# Patient Record
Sex: FEMALE | NOT HISPANIC OR LATINO
[De-identification: names, ages, dates, MRNs, and addresses within clinical notes are randomized per-mention and may not be internally consistent; named-entity substitution may affect disease eponyms.]

---

## 2019-06-12 ENCOUNTER — RX ONLY (RX ONLY)
Age: 49
End: 2019-06-12

## 2019-06-12 RX ORDER — TRETINOIN 0.5 MG/G
CREAM TOPICAL
Qty: 1 | Refills: 0 | Status: ERX | COMMUNITY
Start: 2019-06-12

## 2019-09-10 ENCOUNTER — RX ONLY (RX ONLY)
Age: 49
End: 2019-09-10

## 2019-09-10 RX ORDER — AZELAIC ACID 0.15 G/G
15% GEL TOPICAL TWICE A DAY
Qty: 1 | Refills: 2 | Status: ERX | COMMUNITY
Start: 2019-09-10

## 2021-02-04 ENCOUNTER — IMMUNIZATION (OUTPATIENT)
Dept: FAMILY MEDICINE | Facility: CLINIC | Age: 51
End: 2021-02-04
Payer: COMMERCIAL

## 2021-02-04 PROCEDURE — 0001A PR COVID VAC PFIZER DIL RECON 30 MCG/0.3 ML IM: CPT

## 2021-02-04 PROCEDURE — 91300 PR COVID VAC PFIZER DIL RECON 30 MCG/0.3 ML IM: CPT

## 2021-02-20 ENCOUNTER — HEALTH MAINTENANCE LETTER (OUTPATIENT)
Age: 51
End: 2021-02-20

## 2021-02-25 ENCOUNTER — IMMUNIZATION (OUTPATIENT)
Dept: FAMILY MEDICINE | Facility: CLINIC | Age: 51
End: 2021-02-25
Attending: FAMILY MEDICINE
Payer: COMMERCIAL

## 2021-02-25 PROCEDURE — 0002A PR COVID VAC PFIZER DIL RECON 30 MCG/0.3 ML IM: CPT

## 2021-02-25 PROCEDURE — 91300 PR COVID VAC PFIZER DIL RECON 30 MCG/0.3 ML IM: CPT

## 2021-09-26 ENCOUNTER — HEALTH MAINTENANCE LETTER (OUTPATIENT)
Age: 51
End: 2021-09-26

## 2022-03-12 ENCOUNTER — HEALTH MAINTENANCE LETTER (OUTPATIENT)
Age: 52
End: 2022-03-12

## 2022-12-15 ENCOUNTER — TRANSFERRED RECORDS (OUTPATIENT)
Dept: HEALTH INFORMATION MANAGEMENT | Facility: CLINIC | Age: 52
End: 2022-12-15

## 2022-12-28 ENCOUNTER — TELEPHONE (OUTPATIENT)
Dept: OTOLARYNGOLOGY | Facility: CLINIC | Age: 52
End: 2022-12-28

## 2022-12-28 DIAGNOSIS — C80.1 ADENOCARCINOMA (H): Primary | ICD-10-CM

## 2022-12-28 NOTE — TELEPHONE ENCOUNTER
Writer called to schedule patient with Dr. Vyas.     Patient confirmed appointment for 1/4/23 at 1230.    PET/CT and MRI ordered.     Patient is going to call to get herself scheduled for MRI and PET/CT. She will let me know if she is having difficulty getting this done.     Syeda Jenkins RN on 12/28/2022 at 9:58 AM

## 2022-12-28 NOTE — TELEPHONE ENCOUNTER
FUTURE VISIT INFORMATION      FUTURE VISIT INFORMATION:    Date: 1/4/23    Time: 12:30pm    [place] [facility] [place]  REFERRAL INFORMATION:    Referring provider:      Referring providers clinic:      Reason for visit/diagnosis  Adenocarcinoma    RECORDS REQUESTED FROM:       Clinic name Comments Records Status Imaging Status   imaging 1/3/23- pet, mr, ct - pending  Epic     [facility]  12/15/22- CT Sinus     12/23/22, 12/1/22- note with Brenda Crawley MD  3/27/19- note with Karel Crowley MBBS [facility]  12/28/22- Pending Req  -PACS                             December 28, 2022 10:48 AM - Faxed a request to [facility] to push Image to [facility] PACS- Ciara   December 28, 2022 11:18 AM - Received image in pacs and attached it to patient and sent report to scan- Ciara

## 2023-01-02 ENCOUNTER — TELEPHONE (OUTPATIENT)
Dept: OTOLARYNGOLOGY | Facility: CLINIC | Age: 53
End: 2023-01-02

## 2023-01-02 NOTE — TELEPHONE ENCOUNTER
Writer spoke with patient and confirmed PET CT and MRI are scheduled for tomorrow.     Patient verbalized understanding and has no further questions at this time.    Syeda Jenkins RN on 1/2/2023 at 1:06 PM

## 2023-01-03 ENCOUNTER — HOSPITAL ENCOUNTER (OUTPATIENT)
Dept: MRI IMAGING | Facility: CLINIC | Age: 53
Discharge: HOME | End: 2023-01-03
Attending: OTOLARYNGOLOGY
Payer: COMMERCIAL

## 2023-01-03 ENCOUNTER — ANCILLARY ORDERS (OUTPATIENT)
Dept: OTOLARYNGOLOGY | Facility: CLINIC | Age: 53
End: 2023-01-03

## 2023-01-03 ENCOUNTER — HOSPITAL ENCOUNTER (OUTPATIENT)
Dept: PET IMAGING | Facility: CLINIC | Age: 53
Discharge: HOME | End: 2023-01-03
Attending: OTOLARYNGOLOGY
Payer: COMMERCIAL

## 2023-01-03 DIAGNOSIS — C80.1 ADENOCARCINOMA (H): ICD-10-CM

## 2023-01-03 LAB
CREAT BLD-MCNC: 0.8 MG/DL (ref 0.5–1)
GFR SERPL CREATININE-BSD FRML MDRD: >60 ML/MIN/1.73M2

## 2023-01-03 PROCEDURE — 74177 CT ABD & PELVIS W/CONTRAST: CPT | Mod: 26 | Performed by: RADIOLOGY

## 2023-01-03 PROCEDURE — 70491 CT SOFT TISSUE NECK W/DYE: CPT

## 2023-01-03 PROCEDURE — A9585 GADOBUTROL INJECTION: HCPCS | Performed by: OTOLARYNGOLOGY

## 2023-01-03 PROCEDURE — 70491 CT SOFT TISSUE NECK W/DYE: CPT | Mod: 26 | Performed by: RADIOLOGY

## 2023-01-03 PROCEDURE — 78815 PET IMAGE W/CT SKULL-THIGH: CPT | Mod: PI

## 2023-01-03 PROCEDURE — 250N000011 HC RX IP 250 OP 636: Performed by: OTOLARYNGOLOGY

## 2023-01-03 PROCEDURE — 70553 MRI BRAIN STEM W/O & W/DYE: CPT | Mod: 26 | Performed by: RADIOLOGY

## 2023-01-03 PROCEDURE — 82565 ASSAY OF CREATININE: CPT

## 2023-01-03 PROCEDURE — 343N000001 HC RX 343: Performed by: OTOLARYNGOLOGY

## 2023-01-03 PROCEDURE — 70553 MRI BRAIN STEM W/O & W/DYE: CPT

## 2023-01-03 PROCEDURE — A9552 F18 FDG: HCPCS | Performed by: OTOLARYNGOLOGY

## 2023-01-03 PROCEDURE — 78815 PET IMAGE W/CT SKULL-THIGH: CPT | Mod: 26 | Performed by: RADIOLOGY

## 2023-01-03 PROCEDURE — 71260 CT THORAX DX C+: CPT | Mod: 26 | Performed by: RADIOLOGY

## 2023-01-03 PROCEDURE — 255N000002 HC RX 255 OP 636: Performed by: OTOLARYNGOLOGY

## 2023-01-03 RX ORDER — IOPAMIDOL 755 MG/ML
10-135 INJECTION, SOLUTION INTRAVASCULAR ONCE
Status: COMPLETED | OUTPATIENT
Start: 2023-01-03 | End: 2023-01-03

## 2023-01-03 RX ORDER — GADOBUTROL 604.72 MG/ML
10 INJECTION INTRAVENOUS ONCE
Status: COMPLETED | OUTPATIENT
Start: 2023-01-03 | End: 2023-01-03

## 2023-01-03 RX ADMIN — IOPAMIDOL 100 ML: 755 INJECTION, SOLUTION INTRAVENOUS at 07:40

## 2023-01-03 RX ADMIN — GADOBUTROL 8.5 ML: 604.72 INJECTION INTRAVENOUS at 09:13

## 2023-01-03 RX ADMIN — FLUDEOXYGLUCOSE F-18 10.9 MCI.: 500 INJECTION, SOLUTION INTRAVENOUS at 06:43

## 2023-01-04 ENCOUNTER — PRE VISIT (OUTPATIENT)
Dept: OTOLARYNGOLOGY | Facility: CLINIC | Age: 53
End: 2023-01-04

## 2023-01-04 ENCOUNTER — OFFICE VISIT (OUTPATIENT)
Dept: OTOLARYNGOLOGY | Facility: CLINIC | Age: 53
End: 2023-01-04
Payer: COMMERCIAL

## 2023-01-04 VITALS
RESPIRATION RATE: 16 BRPM | HEART RATE: 71 BPM | HEIGHT: 68 IN | DIASTOLIC BLOOD PRESSURE: 80 MMHG | TEMPERATURE: 96.2 F | WEIGHT: 184 LBS | SYSTOLIC BLOOD PRESSURE: 117 MMHG | BODY MASS INDEX: 27.89 KG/M2

## 2023-01-04 DIAGNOSIS — J34.89 NASAL OBSTRUCTION: ICD-10-CM

## 2023-01-04 DIAGNOSIS — G50.1 ATYPICAL FACIAL PAIN: ICD-10-CM

## 2023-01-04 DIAGNOSIS — C80.1 ADENOCARCINOMA (H): Primary | ICD-10-CM

## 2023-01-04 PROCEDURE — 31231 NASAL ENDOSCOPY DX: CPT | Performed by: OTOLARYNGOLOGY

## 2023-01-04 PROCEDURE — 99204 OFFICE O/P NEW MOD 45 MIN: CPT | Mod: 25 | Performed by: OTOLARYNGOLOGY

## 2023-01-04 RX ORDER — PROPRANOLOL HYDROCHLORIDE 20 MG/1
20 TABLET ORAL 2 TIMES DAILY
COMMUNITY
Start: 2022-01-31

## 2023-01-04 RX ORDER — LEVOTHYROXINE SODIUM 88 UG/1
88 TABLET ORAL
Status: ON HOLD | COMMUNITY
Start: 2022-01-31 | End: 2023-02-03

## 2023-01-04 RX ORDER — RIZATRIPTAN BENZOATE 10 MG/1
TABLET ORAL
COMMUNITY
Start: 2022-12-16

## 2023-01-04 RX ORDER — ONDANSETRON 4 MG/1
4 TABLET, ORALLY DISINTEGRATING ORAL
Status: ON HOLD | COMMUNITY
Start: 2022-12-01 | End: 2023-01-17

## 2023-01-04 RX ORDER — MULTIVIT-MIN/IRON/FOLIC ACID/K 18-600-40
1 CAPSULE ORAL DAILY
COMMUNITY
End: 2024-08-14

## 2023-01-04 RX ORDER — TRETINOIN 0.5 MG/G
1 CREAM TOPICAL AT BEDTIME
COMMUNITY
End: 2023-08-18

## 2023-01-04 RX ORDER — AZELAIC ACID 0.15 G/G
1 GEL TOPICAL DAILY
COMMUNITY
End: 2023-08-18

## 2023-01-04 RX ORDER — LEVOCETIRIZINE DIHYDROCHLORIDE 5 MG/1
TABLET, FILM COATED ORAL EVERY EVENING
COMMUNITY
End: 2024-08-14

## 2023-01-04 ASSESSMENT — PAIN SCALES - GENERAL: PAINLEVEL: NO PAIN (0)

## 2023-01-04 NOTE — PATIENT INSTRUCTIONS
1. Please follow-up in clinic after surgery   2. Please call the ENT clinic with any questions,concerns, new or worsening symptoms.    -Clinic number is [phone]   - Syeda's direct line (Dr. Vyas's nurse) [phone]     Surgery Teaching    1. Someone from our scheduling department will call you within approximately one week to get you scheduled with your provider for surgery. If no one has called you in one week, please notify us.    2. You must have a physical exam (called  history and physical ) within 30 days of surgery. You may complete this with your primary care provider.   A. If your provider is outside of the [facility] network please have them complete the preoperative forms provided to you in the surgery packet you will be mailed and be sure to have your provider fax them to the appropriate location prior to surgery. For surgery at the [facility] the fax number is:[phone]. For surgery at the [facility] the fax number is [phone].  B. In some cases we may have you see our Preoperative Assessment Center. If we have expressed this to you, our  will set up your appointment with them when they call to set up your surgery.    3. For same-day surgery, you must arrange for an adult to take you home from the Center. An adult must stay with you for the first 24 hours after surgery. You cannot drive for 24 hours.     4. Ask your doctor what medicines are safe before surgery. For over the counter medications and supplements it is advised that you do NOT TAKE MOTRIN, IBUPROFEN, ASPIRIN, ALEVE, GARLIC SUPPLEMENTS or FISH OIL x 7 days prior to surgery (to prevent excess bleeding and bruising at time of surgery). If your provider advises you to take any medication the morning of surgery you should take this with a sip of water.    5. A few days prior to surgery a nurse will call you to review your health history and instructions for before and after surgery. They will give you your final arrival time based  upon your scheduled arrival time for surgery.    6. Call the surgical team if there's any change in your health prior to surgery. Things you should call for include but are not limited to signs of a cold or the flu (sore throat, runny nose, cough, rash, fever). Other things to notify them for is for any open wounds (cuts, scrapes, scratches) near to the surgery site.    7. If you drink alcohol, stop drinking alcohol at least 24 hours before surgery.    8. If you smoke, stop or at least cut down on smoking 24 hours before surgery.    9.Take a bath or shower the night before and the morning of surgery (as told by your surgeon). Use an antiseptic soap. If your doctor does not give you special soap, buy Hibiclens or Krupa-Stat at the drug store or ask the pharmacist to suggest a brand. You will wash with this from the neck down, washing your hair and face as you would normally.   A. When you are done with your shower please be sure to use clean towels to dry with, have clean linens on your bed, and put on clean clothes each time.   B. DO NOT put on lotion, powder, perfume, deodorant or make-up after bathing.    10. You can eat a normal meal the night before surgery. Do not eat any solid foods or drink any milk products for 8 hours before surgery.     11. You may drink clear liquids until 2 hours before surgery. Clear liquids include water, Gatorade, apple juice and liquids you can see through.    12. No eating or drinking 2 hours prior to surgery until after surgery. Your post op team will review any diet limitations you might have and when you can start eating and drinking again after surgery.      If you have any questions before or after surgery please call:    FLORECITA Castillo  [facility]  Department of Otolaryngology  [phone]

## 2023-01-04 NOTE — LETTER
1/4/2023       RE: Rosalind Murphy  [address] [place] [facility] [place] [address]     Dear Colleague,    Thank you for referring your patient, Rosalind Murphy, to the [facility] [place] at [facility]. Please see a copy of my visit note below.                       [facility]                   New Patient Visit      Encounter date: January 4, 2023    Referring Provider: Herbert Crawley MD    Chief Complaint: nasal cancery, adenocarcinoma    History of Present Illness: Rosalind Murphy is a pleasant 52 year-old woman who I am seeing at the request of Herbert Crawley for left-sided nasal adenocarcinoma. She had been experiencing ~2 months of worsening left-sided nasal obstruction and facial pressure that would not resolve with antibiotics and OTC measures.  She saw Herbert Crawley who identified a left sided nasal mass. She subsequently underwent CT and then biopsy of this lesion and the path returned moderately differentiated adenocarcinoma (non ITAC). She has no occupational exposures, or any other history of cancers of any other organ system. She denies epistaxis. No nasal pain. Most bothersome symptom is nasal obstruction. She underwent PET/CT and MRI yesterday and we have reviewed these below.         Review of systems: A 14-point review of systems has been conducted and was negative for any notable symptoms, except as dictated in the history of present illness.     PMH:    Allergic rhinitis, cause unspecified     Lumbago     Other specified disorders of thyroid     Rheumatoid arthritis(714.0)     Unspecified cataract       PSH:  No prior sinonasal surgery    FH:   breast cancer - mother      Social History     Socioeconomic History     Marital status:         Physical Exam:  Vital signs: /80 (BP Location: Left arm, Patient Position: Sitting, Cuff Size: Adult Regular)   Pulse 71   Temp (!) 96.2  F (35.7  C) (Temporal)   Resp 16   Ht  "1.727 m (5' 8\")   Wt 83.5 kg (184 lb)   BMI 27.98 kg/m     General Appearance: No acute distress, appropriate demeanor, conversant  Eyes: moist conjunctivae; EOMI; pupils symmetric; visual acuity grossly intact; no proptosis  Head: normocephalic; overall symmetric appearance without deformity  Face: overall symmetric without deformity; HB I/VI  Ears: Normal appearance of external ear; external meatus normal in appearance; TMs intact without perforation bilaterally;   Nose: No external deformity; see nasal endoscopy  Oral Cavity/oropharynx: Normal appearance of mucosa; tongue midline; no mass or lesions; oropharynx without obvious mucosal abnormality  Neck: no palpable lymphadenopathy; thyroid without palpable nodules  Lungs: symmetric chest rise; no wheezing  CV: Good distal perfusion; normal heart rate  Extremities: No deformity  Neurologic Exam: Cranial nerves II-XII are grossly intact; no focal deficit      Procedure Note  Procedure performed: Rigid nasal endoscopy  Indication: To evaluate for sinonasal pathology not visualized on routine anterior rhinoscopy  Anesthesia: 4% topical lidocaine with 0.05% oxymetazoline  Description of procedure: A 30 degree, 3 mm rigid endoscope was inserted into bilateral nasal cavities and the nasal valve, nasal cavity, middle meatus, sphenoethmoid recess, and nasopharynx were thoroughly evaluated for evidence of obstruction, edema, purulence, polyps and/or mass/lesion.     Jia-Imtiaz Endoscopic Scoring System  Endoscopic observation Right Left   Polyps in middle meatus (0 = absent, 1 = restricted to middle meatus, 2 = Beyond middle meatus) 0 0   Discharge (0 = absent, 1 = thin and clear, 2 = thick, purulent) 0 1   Edema (0 = absent, 1 = mild-moderate, 2 = moderate-severe) 1 0   Crusting (0 = absent, 1 = mild-moderate, 2 = moderate-severe) 0 0   Scarring (0= absent, 1 = mild-moderate, 2 = moderate-severe) 0 0   Total 0 1     Findings  RT: MM and SER clear; nasal cavity " clear  LT: MM clear; nasal cavity mass appears to be pedicled posteriorly in the nasal cavity    The patient tolerated the procedure well without complication.     Laboratory Review:  n/a    Imaging Review:  CT sinus 12/15/2022:  1.  The left nasal cavity is opacified along its mid to posterior   aspect with extension of soft tissue into the left posterior   nasopharynx. There is questionable remodeling of the posterolateral   wall of the left nasal cavity. Is an underlying lesion at this   location is possible and correlation with direct visualization is   advised.      MRI skull base w and w/o contrast 1/2/2023  IMPRESSION: 3.2 x 1.4 x 3.1 cm left posterior nasal cavity mass  compatible with pathology proven adenocarcinoma..    PET/CT 1/3/2023:  IMPRESSION: In this patient with biopsy-proven left nasal cavity  adenocarcinoma:  1. No evidence of metastasis in the chest, abdomen or pelvis.  2. Please refer to dedicated report for findings in the head and neck  region.     Impression:   1. 2.8 x 2.7 x 1.4 cm hypermetabolic mass in the left posterior nasal  cavity compatible with pathology proven adenocarcinoma.  2. No cervical lymphadenopathy.   3. Please refer to the whole body PET CT performed as a separate  report, for the findings of the remainder of the body.      *I have personally reviewed these images and agree with the radiologist's interpretation*      Pathology Review:  Nasal mass biopsy - 12/23/2022  Final Diagnosis  NASAL MASS, BIOPSY:   1. Moderately-differentiated adenocarcinoma, favor     sinonasal adenocarcinoma, non-intestinal type        Assessment/Medical Decision Making:  T1N0M0 nasal cavity non-intestinal type adenocarcinoma  Nasal obstruction secondary to above  Atypical facial pain    I reviewed pathology, imaging, and endoscopic findings with Rosalind and her , Brad, today. We discussed treatment options for sinonasal malignancy. We discussed standard of care management, including  endoscopic surgical resection and the possibility of adjuvant radiotherapy for microscopic disease.  Chemotherapy may be indicated for positive margins. We discussed the relevant risks of surgery specific to Rosalind's case, including but not limited to: orbital injury, bleeding secondary to injury to internal maxillary artery or its branches (which might require embolization), dry eye, eustachian tube dysfunction, among other risks. Rosalind and her  were allowed to answer a number of insightful questions. Both were eager to proceed with scheduling surgery.       Plan:  1. [place] [facility] [place]  2. Will schedule surgery - LT endoscopic, endonasal resection of sinonasal adenocarcinoma  3. Radiation oncology referral - Anand Bergeron  4. RTC post-op      Germain Vyas MD    [facility]  Center for Skull Base and Pituitary Surgery  [facility]  Department of Otolaryngology - Head & Neck Surgery          Again, thank you for allowing me to participate in the care of your patient.      Sincerely,    Germain Vyas MD

## 2023-01-04 NOTE — TELEPHONE ENCOUNTER
RECORDS STATUS - ALL OTHER DIAGNOSIS      Action    Action Taken 1/4/23  LVM for pt re: recs call  4:55 PM    Pt returned my call, advised they'd never had RT, Chemo, or met with an oncologist/hematolgist before.  4:59 PM       RECORDS RECEIVED FROM: [facility]   DATE RECEIVED: 1/4   NOTES STATUS DETAILS   OFFICE NOTE from referring provider Germain [place]ifuent[place] [facility]   OPERATIVE REPORT [facility] - [facility] 3/19/15: Exploratory Laparotomy  3/18/15: Hysterectomy  12/5/07: Insertion of Vaginal Tape   MEDICATION LIST [facility]    LABS     PATHOLOGY REPORTS [facility], Report in CE 12/23/22: [id number]   ANYTHING RELATED TO DIAGNOSIS Epic 1/3/22   IMAGING (NEED IMAGES & REPORT)     CT SCANS PACS [facility]  12/15/22    [facility]  1/3/22   MRI PACS [facility]  1/3/22   PET PACS [facility]  1/3/22

## 2023-01-04 NOTE — PROGRESS NOTES
"                   [facility]                   New Patient Visit      Encounter date: January 4, 2023    Referring Provider: Herbert Crawley MD    Chief Complaint: nasal cancery, adenocarcinoma    History of Present Illness: Rosalind Murphy is a pleasant 52 year-old woman who I am seeing at the request of Herbert Crawley for left-sided nasal adenocarcinoma. She had been experiencing ~2 months of worsening left-sided nasal obstruction and facial pressure that would not resolve with antibiotics and OTC measures.  She saw Herbert Crawley who identified a left sided nasal mass. She subsequently underwent CT and then biopsy of this lesion and the path returned moderately differentiated adenocarcinoma (non ITAC). She has no occupational exposures, or any other history of cancers of any other organ system. She denies epistaxis. No nasal pain. Most bothersome symptom is nasal obstruction. She underwent PET/CT and MRI yesterday and we have reviewed these below.         Review of systems: A 14-point review of systems has been conducted and was negative for any notable symptoms, except as dictated in the history of present illness.     PMH:    Allergic rhinitis, cause unspecified     Lumbago     Other specified disorders of thyroid     Rheumatoid arthritis(714.0)     Unspecified cataract       PSH:  No prior sinonasal surgery    FH:   breast cancer - mother      Social History     Socioeconomic History     Marital status:         Physical Exam:  Vital signs: /80 (BP Location: Left arm, Patient Position: Sitting, Cuff Size: Adult Regular)   Pulse 71   Temp (!) 96.2  F (35.7  C) (Temporal)   Resp 16   Ht 1.727 m (5' 8\")   Wt 83.5 kg (184 lb)   BMI 27.98 kg/m     General Appearance: No acute distress, appropriate demeanor, conversant  Eyes: moist conjunctivae; EOMI; pupils symmetric; visual acuity grossly intact; no proptosis  Head: normocephalic; overall symmetric appearance without deformity  Face: overall symmetric " without deformity; HB I/VI  Ears: Normal appearance of external ear; external meatus normal in appearance; TMs intact without perforation bilaterally;   Nose: No external deformity; see nasal endoscopy  Oral Cavity/oropharynx: Normal appearance of mucosa; tongue midline; no mass or lesions; oropharynx without obvious mucosal abnormality  Neck: no palpable lymphadenopathy; thyroid without palpable nodules  Lungs: symmetric chest rise; no wheezing  CV: Good distal perfusion; normal heart rate  Extremities: No deformity  Neurologic Exam: Cranial nerves II-XII are grossly intact; no focal deficit      Procedure Note  Procedure performed: Rigid nasal endoscopy  Indication: To evaluate for sinonasal pathology not visualized on routine anterior rhinoscopy  Anesthesia: 4% topical lidocaine with 0.05% oxymetazoline  Description of procedure: A 30 degree, 3 mm rigid endoscope was inserted into bilateral nasal cavities and the nasal valve, nasal cavity, middle meatus, sphenoethmoid recess, and nasopharynx were thoroughly evaluated for evidence of obstruction, edema, purulence, polyps and/or mass/lesion.     [place]-Imtiaz Endoscopic Scoring System  Endoscopic observation Right Left   Polyps in middle meatus (0 = absent, 1 = restricted to middle meatus, 2 = Beyond middle meatus) 0 0   Discharge (0 = absent, 1 = thin and clear, 2 = thick, purulent) 0 1   Edema (0 = absent, 1 = mild-moderate, 2 = moderate-severe) 1 0   Crusting (0 = absent, 1 = mild-moderate, 2 = moderate-severe) 0 0   Scarring (0= absent, 1 = mild-moderate, 2 = moderate-severe) 0 0   Total 0 1     Findings  RT: MM and SER clear; nasal cavity clear  LT: MM clear; nasal cavity mass appears to be pedicled posteriorly in the nasal cavity    The patient tolerated the procedure well without complication.     Laboratory Review:  n/a    Imaging Review:  CT sinus 12/15/2022:  1.  The left nasal cavity is opacified along its mid to posterior   aspect with extension of soft  tissue into the left posterior   nasopharynx. There is questionable remodeling of the posterolateral   wall of the left nasal cavity. Is an underlying lesion at this   location is possible and correlation with direct visualization is   advised.      MRI skull base w and w/o contrast 1/2/2023  IMPRESSION: 3.2 x 1.4 x 3.1 cm left posterior nasal cavity mass  compatible with pathology proven adenocarcinoma..    PET/CT 1/3/2023:  IMPRESSION: In this patient with biopsy-proven left nasal cavity  adenocarcinoma:  1. No evidence of metastasis in the chest, abdomen or pelvis.  2. Please refer to dedicated report for findings in the head and neck  region.     Impression:   1. 2.8 x 2.7 x 1.4 cm hypermetabolic mass in the left posterior nasal  cavity compatible with pathology proven adenocarcinoma.  2. No cervical lymphadenopathy.   3. Please refer to the whole body PET CT performed as a separate  report, for the findings of the remainder of the body.      *I have personally reviewed these images and agree with the radiologist's interpretation*      Pathology Review:  Nasal mass biopsy - 12/23/2022  Final Diagnosis  NASAL MASS, BIOPSY:   1. Moderately-differentiated adenocarcinoma, favor     sinonasal adenocarcinoma, non-intestinal type        Assessment/Medical Decision Making:  T1N0M0 nasal cavity non-intestinal type adenocarcinoma  Nasal obstruction secondary to above  Atypical facial pain    I reviewed pathology, imaging, and endoscopic findings with Rosalind and her , Brad, today. We discussed treatment options for sinonasal malignancy. We discussed standard of care management, including endoscopic surgical resection and the possibility of adjuvant radiotherapy for microscopic disease.  Chemotherapy may be indicated for positive margins. We discussed the relevant risks of surgery specific to Rosalind's case, including but not limited to: orbital injury, bleeding secondary to injury to internal maxillary artery or its  branches (which might require embolization), dry eye, eustachian tube dysfunction, among other risks. Rosalind and her  were allowed to answer a number of insightful questions. Both were eager to proceed with scheduling surgery.       Plan:  1. Plan to present case at [facility] this Friday  2. Will schedule surgery - LT endoscopic, endonasal resection of sinonasal adenocarcinoma  3. Radiation oncology referral - Anand Bergeron  4. RTC post-op      Germain Vyas MD    [facility]  Center for Skull Base and Pituitary Surgery  [facility]  Department of Otolaryngology - Head & Neck Surgery

## 2023-01-04 NOTE — TUMOR CONFERENCE
Head & Neck Tumor Conference Note   Status: New  Staff: Dr. Vyas     Tumor Site: left nasal cavity  Tumor Pathology: non-intestinal type adenocarcinoma  Tumor Stage: T1N0Mx  Tumor Treatment: n/a    Reason for Review: Review imaging, path, and POC    Brief History: This is a 52 year old female with a new left nasal cavity mass. She has a 2 month history of left sided nasal congestion, with complete lack of airflow over the past few weeks. She has facial pressure on that side extending up over her eye. Also notices discolored drainage and foul smell. She though it was allergies. Has tried sudafed, amxocillin, keflex, flonase, and allegra-d. Has also tried flonase and saline rinses. Had sinus ct which showed mass in left nasal cavity. No history of sinus surgery. A biopsy was performed at an OSH and she was referred to Dr. Vyas.    Pertinent PMH: Allergic rhinitis, cause unspecified; Lumbago; Other specified disorders of thyroid; Rheumatoid arthritis(714.0); Unspecified cataract   Smoking Hx:  never smoker     Imagin/3/2023 PET/CT   1. No evidence of metastasis in the chest, abdomen or pelvis.  2. 2.8 x 2.7 x 1.4 cm hypermetabolic mass in the left posterior nasal  cavity compatible with pathology proven adenocarcinoma.  3. No cervical lymphadenopathy.     1/3/2023 MRI   IMPRESSION: 3.2 x 1.4 x 3.1 cm left posterior nasal cavity masscompatible with pathology proven adenocarcinoma..    Pathology:   2022: left nasal cavity mass biopsy (not yet reviewed by N pathology)   1. Moderately-differentiated adenocarcinoma, favor sinonasal adenocarcinoma, non-intestinal type     Tumor Board Recommendation:   Discussion: Review of available imaging demonstrates a moderately FDG avid mass in the left nasal cavity. No ethmoid or maxillary sinus involvement, though ethmoids with inflammatory mucosal changes. The bone along the floor of nose  does not appear involved, consistent with clinical exam findings.  No  lymphadenopathy.     - Surgical excision of nasal cavity mass   - Anticipate adjuvant treatment but will await final surgical pathology     Mitzi Back MD  Otolaryngology Head and Neck Surgery Resident, PGY-3    Documentation / Disclaimer Cancer Tumor Board Note: Cancer tumor board recommendations do not override what is determined to be reasonable care and treatment, which is dependent on the circumstances of a patient's case; the patient's medical, social, and personal concerns; and the clinical judgment of the oncologist [physician].

## 2023-01-04 NOTE — LETTER
Date:January 5, 2023      Patient was self referred, no letter generated. Do not send.        [facility] Health Information

## 2023-01-05 NOTE — TELEPHONE ENCOUNTER
Records Requested   January 5, 2023 8:51 AM  [id number]   Facility  [facility]  Pathology   [address], [place] [address]  Phone: [phone]   Outcome 12/23/22 Nasal Biopsy (Case: [id number]  ) report in care everywhere, sent a fax for path send out  Tracking #: [id number]    1/6/23 9:10AM called bhargav, transferred to consultation center. They are sending the slides out today  - Amay

## 2023-01-06 ENCOUNTER — TUMOR CONFERENCE (OUTPATIENT)
Dept: ONCOLOGY | Facility: CLINIC | Age: 53
End: 2023-01-06
Payer: COMMERCIAL

## 2023-01-06 ENCOUNTER — TELEPHONE (OUTPATIENT)
Dept: OTOLARYNGOLOGY | Facility: CLINIC | Age: 53
End: 2023-01-06

## 2023-01-06 NOTE — TELEPHONE ENCOUNTER
Called patient to schedule surgery with Dr. Vyas    Date of Surgery: 1/17    Location of surgery: [place] OR    Pre-Op H&P: PCP scheduled for 1/13    Pre/Post Imaging:  Not Applicable    Discussed COVID-19 Testing: Not Applicable    Post-Op Appt Date: 1 week    Surgery Packet Mailed: 1/6      Additional comments: ARMANDO Larson on 1/6/2023 at 11:54 AM

## 2023-01-06 NOTE — TELEPHONE ENCOUNTER
Left patient a voicemail to schedule EXCISION, NEOPLASM, PARANASAL SINUS, ENDOSCOPIC, USING OPTICAL TRACKING SYSTEM (Left)  with Dr. Asael Larson on 1/6/2023 at 11:44 AM

## 2023-01-10 ENCOUNTER — OFFICE VISIT (OUTPATIENT)
Dept: RADIATION ONCOLOGY | Facility: CLINIC | Age: 53
End: 2023-01-10
Attending: OTOLARYNGOLOGY
Payer: COMMERCIAL

## 2023-01-10 ENCOUNTER — PRE VISIT (OUTPATIENT)
Dept: RADIATION ONCOLOGY | Facility: CLINIC | Age: 53
End: 2023-01-10

## 2023-01-10 VITALS
SYSTOLIC BLOOD PRESSURE: 112 MMHG | BODY MASS INDEX: 28.33 KG/M2 | DIASTOLIC BLOOD PRESSURE: 76 MMHG | OXYGEN SATURATION: 97 % | TEMPERATURE: 97.9 F | WEIGHT: 186.3 LBS | HEART RATE: 74 BPM

## 2023-01-10 DIAGNOSIS — C80.1 ADENOCARCINOMA (H): ICD-10-CM

## 2023-01-10 PROCEDURE — 99205 OFFICE O/P NEW HI 60 MIN: CPT | Performed by: SURGERY

## 2023-01-10 ASSESSMENT — PAIN SCALES - GENERAL: PAINLEVEL: NO PAIN (0)

## 2023-01-10 NOTE — NURSING NOTE
"INITIAL PATIENT ASSESSMENT      Diagnosis: nasal cancer    Prior radiation therapy: None    Prior chemotherapy: None        Pain Eval:  Denies at current visit, patient reports intermittent frontal sinus headaches with extension into left forehead.  Patient reports worsening sinus congestion.    Psychosocial  Living arrangements: lives at home in [place], presents to clinic with  Brad.  Fall Risk: independent  [facility] Falls Risk Screening Completed: Yes Result: Negative   referral needs: Not needed    Advanced Directive: No, patient declines information packet  Implantable Cardiac Device: No  Authorization To Share Protected Health Information: YES - Date: 1/10/2023      Onset of menopause: patient reports hysterectomy in 2015.  Abnormal vaginal bleeding/discharge: No  Are you pregnant? No  Reproductive note: patient reports she has four adult children and a one-month-old grandchild  Urine Pregnancy Testing Needed: No, s/p hysterectomy    Review of Systems     Constitutional: Positive for malaise/fatigue. Negative for chills, diaphoresis, fever and weight loss.   HENT: Positive for sinus pain and tinnitus. Negative for congestion, ear discharge, ear pain, hearing loss, nosebleeds and sore throat.         Patient reports worsening tinnitus of bilateral ears.  Patient reports \"I can't breathe at all out of the left side of my nose\", patient reports sleeping in bed at night with head of bed elevated.  Patient reports worsening dry mouth and decreased taste with sinus congestion, reviewed baking soda and salt rinse.  Patient reports intermittent draining of nasal secretions on left side and increased drainage of right side.  Patient reports intermittent sinus headaches and upper left eye pain.  Patient reports she follows with [address] [place] [address] Dental in [place], Dr. Monsalve.  Patient reports she has not had a dental evaluation in two years.   Eyes: Negative.    Respiratory: Positive for shortness of breath. " Negative for cough, hemoptysis, sputum production, wheezing and stridor.         Patient reports intermittent shortness of breath with increasing nasal congestion.   Cardiovascular: Negative.    Gastrointestinal: Negative.    Genitourinary: Negative.    Musculoskeletal: Negative.    Skin: Negative.    Neurological: Positive for headaches. Negative for dizziness, tingling, tremors, sensory change, speech change, focal weakness, seizures, loss of consciousness and weakness.        Patient reports intermittent migraines.   Endo/Heme/Allergies: Negative.    Psychiatric/Behavioral: Negative.         Reviewed support resources through [employer] and support resources at [facility]: spiritual care, social work and cancer center psychologist.     Nurse face-to-face time: Level 5:  over 15 min face to face time.    Darline Villa RN BSN OCN CBCN

## 2023-01-10 NOTE — LETTER
1/10/2023         RE: Rosalind Murphy  [address] [facility] [address]        Dear Colleague,    Thank you for referring your patient, Rosalind Murphy, to the [facility]ATION [facility] [place]. Please see a copy of my visit note below.       Department of Radiation Oncology  [facility]  [facility]  [address] [address] [place] [address]  [phone]       Consultation Note    Name: Rosalind Murphy MRN: [id number]   : 1970   Date of Service: 1/10/2023  Referring: Dr. Vyas     Reason for consultation: left sinonasal adenocarcinoma     History of Present Illness     Ms. Murphy is a 52 year old female presenting with a newly diagnosed left sinonasal adenocarcinoma.    Briefly, her oncologic history is as follows:    Patient describes a 4-month history of left-sided nasal obstruction associated with mucus, prompting further evaluation.    She eventually was seen by an otolaryngologist Dr. Herbert Crawley for left sided nasal obstruction.  At that time she did notice drainage and foul smell, and it initially tried Sudafed, antibiotics Flonase and antihistamines without any relief.    CT scan was performed on 12/15/2022, demonstrating a left nasal cavity opacification from mid to posterior with extension of the soft tissue mass into the left posterior nasopharynx lumen.    She subsequently underwent biopsy on 2022 of the left nasal cavity mass, with pathology returning as a moderately differentiated adenocarcinoma not intestinal type.    PET scan was performed on 1/3/2023 which not demonstrate any evidence of any cervical adenopathy nor any distant metastatic disease.  There is demonstration of a 2.7 cm hypermetabolic mass in the left posterior nasal cavity compatible with known adenocarcinoma.    MRI was performed on 1/3/2023 demonstrating a 3.2 cm left posterior nasal cavity mass.  In general there was a T1 hypointense T2  intermediate signal measuring 3.2 centimeters in greatest dimension on the left posterior nasal cavity which extended to the nasopharyngeal lumen.  There was evidence of left ethmoid air cell mucosal thickening and possible left sphenoid inflammatory mucosal thickening.  There was no definite extension however, into the maxillary sinus, or into the ethmoid air cells or extension into the orbit, or palate.  There is no evidence of intracranial extension.  The right side was completely normal.    She was eventually saw Dr. Vyas at [facility] on 1/4/2023.  At that time, rigid nasal endoscopy was performed which demonstrated a left-sided nasal cavity mass which appears to be pedicled posteriorly in the nasal cavity.    Today, patient presents with her .  She states that still has left-sided nasal obstruction with complete lack of airflow, and also has intermittent left facial pressure in the supraorbital location.  However she denies any epistaxis, and her facial pain.  She does endorse a 20/200 vision in the left eye which she has had since childhood but denies any new ocular changes of vision loss, double vision,/blurry vision.  She denies any loss of smell/taste, clear fluid, or any lumps or bumps in the neck.Denies any occupational exposures and any smoking history.  She denies any prior radiation.      Her case was discussed at head neck tumor board with recommendation for surgical excision of nasal cavity mass with final adjuvant treatment depending upon surgical pathology.    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       Past Surgical History:   Past Surgical History:   Procedure Laterality Date     CATARACT IOL, RT/LT Left     as a child     HYSTERECTOMY  2015     LAPAROTOMY EXPLORATORY  2015     [place] BIOPSY NASAL LESION  12/23/2022     RETINAL DETACHMENT SURGERY Left 2019     TONSILLECTOMY       age 6       Chemotherapy History:  No prior chemotherapy    Radiation History:  No prior radiation    Pregnant: No, would obtain bHCG prior to CT simultion  Implanted Cardiac Devices: No    Medications:  Current Outpatient Medications   Medication     Ascorbic Acid (VITAMIN C) 500 MG CAPS     azelaic acid (FINACIA) 15 % external gel     diclofenac (VOLTAREN) 1 % topical gel     levocetirizine (XYZAL) 5 MG tablet     levothyroxine (SYNTHROID/LEVOTHROID) 88 MCG tablet     ondansetron (ZOFRAN ODT) 4 MG ODT tab     propranolol (INDERAL) 20 MG tablet     rizatriptan (MAXALT) 10 MG tablet     tretinoin (RETIN-A) 0.05 % external cream     No current facility-administered medications for this visit.       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 Pertinent findings are noted in the HPI.    Physical Exam   ECOG Status: 0    Vitals:  /76 (BP Location: Left arm, Patient Position: Chair, Cuff Size: Adult Regular)   Pulse 74   Temp 97.9  F (36.6  C) (Oral)   Wt 84.5 kg (186 lb 4.8 oz)   SpO2 97%   BMI 28.33 kg/m      Gen: Alert, in NAD  Head: NC/AT  Eyes: PERRL, EOMI, sclera anicteric  Ears: No external auricular lesions  Nose/sinus: No rhinorrhea or epistaxis  Oral cavity/oropharynx: MMM, no visible oral cavity lesions, FOM and BOT are soft to palpation  Neck: Full ROM, supple, no palpable  adenopathy  Pulm: No wheezing, stridor or respiratory distress  CV: Extremities are warm and well-perfused, no cyanosis, no pedal edema  Abdominal: Normal bowel sounds, soft, nontender, no masses  Musculoskeletal: Normal bulk and tone  Skin: Normal color and turgor  Neuro: A/Ox3, CN II-XII intact, normal gait    Imaging/Path/Labs   Imaging: Per HPI, reviewed and in agreement.  On personal review it appears that the left-sided nasal cavity mass appears to be pedicled and in the left posterior septum and is filling the entire nasal cavity proper without any evidence of bony erosion, with no discrete nasopharyngeal mucosal involvement, there is no evidence of any cranial extension, no evidence of cervical lymphadenopathy or distant disease    Path: Per HPI, reviewed and in agreement    Labs: Per HPI reviewed and in agreement    Assessment      Ms. Murphy is a 52 year old female presenting with a newly diagnosed left nasal cavity adenocarcinoma, cT1-T2N0M0. On personal review of imgaging, it appears to be attached to the left posterior septum (although best determination would be in the OR). It is also unclear whether this involves a single site or multiple sites (T1 vs T2).      We discussed the management of sinonasal cancers. Per NCCN guidelines, general treatment paradigm for T1/T2 tumors involves surgical resection (preferred) followed by adjuvant therapy as indicated. In general, T1 tumors (without adverse features and favorable histology) can be observed. Otherwise, if T2 or higher and with adverse features, adjuvant treatment includes RT +/- chemotherapy) In patient unfit for surgery or unresectable disease, definitive RT (T1/T2) or concurrent chemoRT (T3/T4a) can be considered.    In this particular instance, her case was also discussed at head neck tumor board with recommendation for surgical excision of left nasal cavity mass with final adjuvant treatment depending upon surgical pathology.    Plan     1.  Patient is scheduled for surgery with Dr. Vyas on 1/17/23, with follow up on 1/25/23.    2. Radiation oncology follow up to review pathology and final radiation recommendation 2 weeks after surgery on 1/31/23 with tentative CT simulation in the even adjuvant RT is recommended.     All benefits and risks discussed, and patient is in agreement with the oncologic plan discussed above.     Thank you for allowing me to participate in your patient's care.  If you should require any additional information, please do not hesitate in contacting me.     Jules Pereira MD  Department of Radiation Oncology  [facility]         Again, thank you for allowing me to participate in the care of your patient.        Sincerely,        Jules Pereira MD

## 2023-01-10 NOTE — TELEPHONE ENCOUNTER
Action     Action Taken January 10, 2023 12:58 PM  [id number]     Pathology received from [facility] and sent to 5th floor path lab to be reviewed with filled out pathology consult form.    Path req: 12/23/22 Nasal Biopsy (Case: [id number])

## 2023-01-11 NOTE — PROGRESS NOTES
Department of Radiation Oncology  [facility] Cancer Center  [facility]  [address] [address] [place] [address]  [phone]       Consultation Note    Name: Rosalind Murphy MRN: [id number]   : 1970   Date of Service: 1/10/2023  Referring: Dr. Vyas     Reason for consultation: left sinonasal adenocarcinoma     History of Present Illness     Ms. Murphy is a 52 year old female presenting with a newly diagnosed left sinonasal adenocarcinoma.    Briefly, her oncologic history is as follows:    Patient describes a 4-month history of left-sided nasal obstruction associated with mucus, prompting further evaluation.    She eventually was seen by an otolaryngologist Dr. Herbert Crawley for left sided nasal obstruction.  At that time she did notice drainage and foul smell, and it initially tried Sudafed, antibiotics Flonase and antihistamines without any relief.    CT scan was performed on 12/15/2022, demonstrating a left nasal cavity opacification from mid to posterior with extension of the soft tissue mass into the left posterior nasopharynx lumen.    She subsequently underwent biopsy on 2022 of the left nasal cavity mass, with pathology returning as a moderately differentiated adenocarcinoma not intestinal type.    PET scan was performed on 1/3/2023 which not demonstrate any evidence of any cervical adenopathy nor any distant metastatic disease.  There is demonstration of a 2.7 cm hypermetabolic mass in the left posterior nasal cavity compatible with known adenocarcinoma.    MRI was performed on 1/3/2023 demonstrating a 3.2 cm left posterior nasal cavity mass.  In general there was a T1 hypointense T2 intermediate signal measuring 3.2 centimeters in greatest dimension on the left posterior nasal cavity which extended to the nasopharyngeal lumen.  There was evidence of left ethmoid air cell mucosal thickening and possible left sphenoid inflammatory mucosal  thickening.  There was no definite extension however, into the maxillary sinus, or into the ethmoid air cells or extension into the orbit, or palate.  There is no evidence of intracranial extension.  The right side was completely normal.    She was eventually saw Dr. Vyas at [facility] on 1/4/2023.  At that time, rigid nasal endoscopy was performed which demonstrated a left-sided nasal cavity mass which appears to be pedicled posteriorly in the nasal cavity.    Today, patient presents with her .  She states that still has left-sided nasal obstruction with complete lack of airflow, and also has intermittent left facial pressure in the supraorbital location.  However she denies any epistaxis, and her facial pain.  She does endorse a 20/200 vision in the left eye which she has had since childhood but denies any new ocular changes of vision loss, double vision,/blurry vision.  She denies any loss of smell/taste, clear fluid, or any lumps or bumps in the neck.Denies any occupational exposures and any smoking history.  She denies any prior radiation.      Her case was discussed at head neck tumor board with recommendation for surgical excision of nasal cavity mass with final adjuvant treatment depending upon surgical pathology.    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       Past Surgical History:   Past Surgical History:   Procedure Laterality Date     CATARACT IOL, RT/LT Left     as a child     HYSTERECTOMY  2015     LAPAROTOMY EXPLORATORY  2015     [place] BIOPSY NASAL LESION  12/23/2022     RETINAL DETACHMENT SURGERY Left 2019     TONSILLECTOMY      age 6       Chemotherapy History:  No prior chemotherapy    Radiation History:  No prior radiation    Pregnant: No, would obtain bHCG prior to CT simultion  Implanted Cardiac Devices: No    Medications:  Current Outpatient Medications   Medication      Ascorbic Acid (VITAMIN C) 500 MG CAPS     azelaic acid (FINACIA) 15 % external gel     diclofenac (VOLTAREN) 1 % topical gel     levocetirizine (XYZAL) 5 MG tablet     levothyroxine (SYNTHROID/LEVOTHROID) 88 MCG tablet     ondansetron (ZOFRAN ODT) 4 MG ODT tab     propranolol (INDERAL) 20 MG tablet     rizatriptan (MAXALT) 10 MG tablet     tretinoin (RETIN-A) 0.05 % external cream     No current facility-administered medications for this visit.       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 Pertinent findings are noted in the HPI.    Physical Exam   ECOG Status: 0    Vitals:  /76 (BP Location: Left arm, Patient Position: Chair, Cuff Size: Adult Regular)   Pulse 74   Temp 97.9  F (36.6  C) (Oral)   Wt 84.5 kg (186 lb 4.8 oz)   SpO2 97%   BMI 28.33 kg/m      Gen: Alert, in NAD  Head: NC/AT  Eyes: PERRL, EOMI, sclera anicteric  Ears: No external auricular lesions  Nose/sinus: No rhinorrhea or epistaxis  Oral cavity/oropharynx: MMM, no visible oral cavity lesions, FOM and BOT are soft to palpation  Neck: Full ROM, supple, no palpable adenopathy  Pulm: No wheezing, stridor or respiratory distress  CV: Extremities are warm and well-perfused, no cyanosis, no pedal edema  Abdominal: Normal bowel sounds, soft, nontender, no masses  Musculoskeletal: Normal bulk and tone  Skin: Normal color and  kaushal  Neuro: A/Ox3, CN II-XII intact, normal gait    Imaging/Path/Labs   Imaging: Per HPI, reviewed and in agreement.  On personal review it appears that the left-sided nasal cavity mass appears to be pedicled and in the left posterior septum and is filling the entire nasal cavity proper without any evidence of bony erosion, with no discrete nasopharyngeal mucosal involvement, there is no evidence of any cranial extension, no evidence of cervical lymphadenopathy or distant disease    Path: Per HPI, reviewed and in agreement    Labs: Per HPI reviewed and in agreement    Assessment      Ms. Murphy is a 52 year old female presenting with a newly diagnosed left nasal cavity adenocarcinoma, cT1-T2N0M0. On personal review of imgaging, it appears to be attached to the left posterior septum (although best determination would be in the OR). It is also unclear whether this involves a single site or multiple sites (T1 vs T2).      We discussed the management of sinonasal cancers. Per NCCN guidelines, general treatment paradigm for T1/T2 tumors involves surgical resection (preferred) followed by adjuvant therapy as indicated. In general, T1 tumors (without adverse features and favorable histology) can be observed. Otherwise, if T2 or higher and with adverse features, adjuvant treatment includes RT +/- chemotherapy) In patient unfit for surgery or unresectable disease, definitive RT (T1/T2) or concurrent chemoRT (T3/T4a) can be considered.    In this particular instance, her case was also discussed at head neck tumor board with recommendation for surgical excision of left nasal cavity mass with final adjuvant treatment depending upon surgical pathology.    Plan     1. Patient is scheduled for surgery with Dr. Vyas on 1/17/23, with follow up on 1/25/23.    2. Radiation oncology follow up to review pathology and final radiation recommendation 2 weeks after surgery on 1/31/23 with tentative CT simulation in the even adjuvant RT  is recommended.     All benefits and risks discussed, and patient is in agreement with the oncologic plan discussed above.     Thank you for allowing me to participate in your patient's care.  If you should require any additional information, please do not hesitate in contacting me.     Jules Pereira MD  Department of Radiation Oncology  [facility]

## 2023-01-12 ENCOUNTER — TRANSCRIBE ORDERS (OUTPATIENT)
Dept: OTHER | Age: 53
End: 2023-01-12

## 2023-01-12 ENCOUNTER — LAB REQUISITION (OUTPATIENT)
Dept: LAB | Facility: CLINIC | Age: 53
End: 2023-01-12
Payer: COMMERCIAL

## 2023-01-12 DIAGNOSIS — C80.1 ADENOCARCINOMA (H): Primary | ICD-10-CM

## 2023-01-12 PROCEDURE — 88323 CONSLTJ&REPRT MATRL PREP SLD: CPT | Mod: TC | Performed by: OTOLARYNGOLOGY

## 2023-01-12 PROCEDURE — 88341 IMHCHEM/IMCYTCHM EA ADD ANTB: CPT | Mod: 26 | Performed by: STUDENT IN AN ORGANIZED HEALTH CARE EDUCATION/TRAINING PROGRAM

## 2023-01-12 PROCEDURE — 88323 CONSLTJ&REPRT MATRL PREP SLD: CPT | Mod: 26 | Performed by: STUDENT IN AN ORGANIZED HEALTH CARE EDUCATION/TRAINING PROGRAM

## 2023-01-12 PROCEDURE — 88342 IMHCHEM/IMCYTCHM 1ST ANTB: CPT | Mod: 26 | Performed by: STUDENT IN AN ORGANIZED HEALTH CARE EDUCATION/TRAINING PROGRAM

## 2023-01-17 ENCOUNTER — ANESTHESIA EVENT (OUTPATIENT)
Dept: SURGERY | Facility: CLINIC | Age: 53
End: 2023-01-17
Payer: COMMERCIAL

## 2023-01-17 ENCOUNTER — ANESTHESIA (OUTPATIENT)
Dept: SURGERY | Facility: CLINIC | Age: 53
End: 2023-01-17
Payer: COMMERCIAL

## 2023-01-17 ENCOUNTER — HOSPITAL ENCOUNTER (OUTPATIENT)
Facility: CLINIC | Age: 53
Discharge: HOME | End: 2023-01-17
Attending: OTOLARYNGOLOGY | Admitting: OTOLARYNGOLOGY
Payer: COMMERCIAL

## 2023-01-17 VITALS
SYSTOLIC BLOOD PRESSURE: 141 MMHG | TEMPERATURE: 98.1 F | BODY MASS INDEX: 27.93 KG/M2 | RESPIRATION RATE: 16 BRPM | DIASTOLIC BLOOD PRESSURE: 83 MMHG | WEIGHT: 184.3 LBS | HEART RATE: 67 BPM | HEIGHT: 68 IN | OXYGEN SATURATION: 95 %

## 2023-01-17 DIAGNOSIS — C31.9 SINUS CANCER WITH INTRACRANIAL EXTENSION (H): Primary | ICD-10-CM

## 2023-01-17 DIAGNOSIS — C30.0 PRIMARY ADENOCARCINOMA OF NASAL CAVITY (H): Primary | ICD-10-CM

## 2023-01-17 DIAGNOSIS — C79.31 SINUS CANCER WITH INTRACRANIAL EXTENSION (H): Primary | ICD-10-CM

## 2023-01-17 PROCEDURE — 61782 SCAN PROC CRANIAL EXTRA: CPT | Mod: GC | Performed by: OTOLARYNGOLOGY

## 2023-01-17 PROCEDURE — 370N000017 HC ANESTHESIA TECHNICAL FEE, PER MIN: Performed by: OTOLARYNGOLOGY

## 2023-01-17 PROCEDURE — 88331 PATH CONSLTJ SURG 1 BLK 1SPC: CPT | Mod: TC | Performed by: OTOLARYNGOLOGY

## 2023-01-17 PROCEDURE — 88331 PATH CONSLTJ SURG 1 BLK 1SPC: CPT | Mod: 26 | Performed by: STUDENT IN AN ORGANIZED HEALTH CARE EDUCATION/TRAINING PROGRAM

## 2023-01-17 PROCEDURE — 710N000012 HC RECOVERY PHASE 2, PER MINUTE: Performed by: OTOLARYNGOLOGY

## 2023-01-17 PROCEDURE — 250N000011 HC RX IP 250 OP 636: Performed by: OTOLARYNGOLOGY

## 2023-01-17 PROCEDURE — 250N000011 HC RX IP 250 OP 636: Performed by: STUDENT IN AN ORGANIZED HEALTH CARE EDUCATION/TRAINING PROGRAM

## 2023-01-17 PROCEDURE — 250N000011 HC RX IP 250 OP 636: Performed by: ANESTHESIOLOGY

## 2023-01-17 PROCEDURE — C2625 STENT, NON-COR, TEM W/DEL SY: HCPCS | Performed by: OTOLARYNGOLOGY

## 2023-01-17 PROCEDURE — 88341 IMHCHEM/IMCYTCHM EA ADD ANTB: CPT | Mod: 26 | Performed by: STUDENT IN AN ORGANIZED HEALTH CARE EDUCATION/TRAINING PROGRAM

## 2023-01-17 PROCEDURE — 88342 IMHCHEM/IMCYTCHM 1ST ANTB: CPT | Mod: 26 | Performed by: STUDENT IN AN ORGANIZED HEALTH CARE EDUCATION/TRAINING PROGRAM

## 2023-01-17 PROCEDURE — 999N000141 HC STATISTIC PRE-PROCEDURE NURSING ASSESSMENT: Performed by: OTOLARYNGOLOGY

## 2023-01-17 PROCEDURE — 258N000003 HC RX IP 258 OP 636: Performed by: ANESTHESIOLOGY

## 2023-01-17 PROCEDURE — 250N000009 HC RX 250: Performed by: OTOLARYNGOLOGY

## 2023-01-17 PROCEDURE — 31257 NSL/SINS NDSC TOT W/SPHENDT: CPT | Mod: LT | Performed by: OTOLARYNGOLOGY

## 2023-01-17 PROCEDURE — 31267 ENDOSCOPY MAXILLARY SINUS: CPT | Mod: 51 | Performed by: OTOLARYNGOLOGY

## 2023-01-17 PROCEDURE — 250N000025 HC SEVOFLURANE, PER MIN: Performed by: OTOLARYNGOLOGY

## 2023-01-17 PROCEDURE — 710N000010 HC RECOVERY PHASE 1, LEVEL 2, PER MIN: Performed by: OTOLARYNGOLOGY

## 2023-01-17 PROCEDURE — 88307 TISSUE EXAM BY PATHOLOGIST: CPT | Mod: TC | Performed by: OTOLARYNGOLOGY

## 2023-01-17 PROCEDURE — 31276 NSL/SINS NDSC FRNT TISS RMVL: CPT | Mod: 51 | Performed by: OTOLARYNGOLOGY

## 2023-01-17 PROCEDURE — 250N000009 HC RX 250: Performed by: ANESTHESIOLOGY

## 2023-01-17 PROCEDURE — 272N000001 HC OR GENERAL SUPPLY STERILE: Performed by: OTOLARYNGOLOGY

## 2023-01-17 PROCEDURE — 250N000013 HC RX MED GY IP 250 OP 250 PS 637: Performed by: OTOLARYNGOLOGY

## 2023-01-17 PROCEDURE — 88305 TISSUE EXAM BY PATHOLOGIST: CPT | Mod: 26 | Performed by: STUDENT IN AN ORGANIZED HEALTH CARE EDUCATION/TRAINING PROGRAM

## 2023-01-17 PROCEDURE — 360N000077 HC SURGERY LEVEL 4, PER MIN: Performed by: OTOLARYNGOLOGY

## 2023-01-17 PROCEDURE — 250N000009 HC RX 250: Performed by: STUDENT IN AN ORGANIZED HEALTH CARE EDUCATION/TRAINING PROGRAM

## 2023-01-17 PROCEDURE — 88307 TISSUE EXAM BY PATHOLOGIST: CPT | Mod: 26 | Performed by: STUDENT IN AN ORGANIZED HEALTH CARE EDUCATION/TRAINING PROGRAM

## 2023-01-17 DEVICE — PROPEL MINI SINUS IMPLANT
Type: IMPLANTABLE DEVICE | Site: NOSE | Status: FUNCTIONAL
Brand: PROPEL MINI

## 2023-01-17 RX ORDER — LIDOCAINE HYDROCHLORIDE AND EPINEPHRINE 10; 10 MG/ML; UG/ML
INJECTION, SOLUTION INFILTRATION; PERINEURAL PRN
Status: DISCONTINUED | OUTPATIENT
Start: 2023-01-17 | End: 2023-01-17 | Stop reason: HOSPADM

## 2023-01-17 RX ORDER — SODIUM CHLORIDE, SODIUM LACTATE, POTASSIUM CHLORIDE, CALCIUM CHLORIDE 600; 310; 30; 20 MG/100ML; MG/100ML; MG/100ML; MG/100ML
INJECTION, SOLUTION INTRAVENOUS CONTINUOUS
Status: DISCONTINUED | OUTPATIENT
Start: 2023-01-17 | End: 2023-01-17 | Stop reason: HOSPADM

## 2023-01-17 RX ORDER — HYDROMORPHONE HYDROCHLORIDE 1 MG/ML
0.2 INJECTION, SOLUTION INTRAMUSCULAR; INTRAVENOUS; SUBCUTANEOUS EVERY 5 MIN PRN
Status: DISCONTINUED | OUTPATIENT
Start: 2023-01-17 | End: 2023-01-17 | Stop reason: HOSPADM

## 2023-01-17 RX ORDER — CEFAZOLIN SODIUM/WATER 2 G/20 ML
2 SYRINGE (ML) INTRAVENOUS SEE ADMIN INSTRUCTIONS
Status: DISCONTINUED | OUTPATIENT
Start: 2023-01-17 | End: 2023-01-17 | Stop reason: HOSPADM

## 2023-01-17 RX ORDER — ONDANSETRON 2 MG/ML
INJECTION INTRAMUSCULAR; INTRAVENOUS PRN
Status: DISCONTINUED | OUTPATIENT
Start: 2023-01-17 | End: 2023-01-17

## 2023-01-17 RX ORDER — DEXAMETHASONE SODIUM PHOSPHATE 4 MG/ML
10 INJECTION, SOLUTION INTRA-ARTICULAR; INTRALESIONAL; INTRAMUSCULAR; INTRAVENOUS; SOFT TISSUE ONCE
Status: DISCONTINUED | OUTPATIENT
Start: 2023-01-17 | End: 2023-01-17 | Stop reason: HOSPADM

## 2023-01-17 RX ORDER — ONDANSETRON 2 MG/ML
4 INJECTION INTRAMUSCULAR; INTRAVENOUS EVERY 30 MIN PRN
Status: DISCONTINUED | OUTPATIENT
Start: 2023-01-17 | End: 2023-01-17 | Stop reason: HOSPADM

## 2023-01-17 RX ORDER — FENTANYL CITRATE 50 UG/ML
25 INJECTION, SOLUTION INTRAMUSCULAR; INTRAVENOUS
Status: DISCONTINUED | OUTPATIENT
Start: 2023-01-17 | End: 2023-01-17 | Stop reason: HOSPADM

## 2023-01-17 RX ORDER — CEFAZOLIN SODIUM/WATER 2 G/20 ML
2 SYRINGE (ML) INTRAVENOUS
Status: COMPLETED | OUTPATIENT
Start: 2023-01-17 | End: 2023-01-17

## 2023-01-17 RX ORDER — FENTANYL CITRATE 50 UG/ML
INJECTION, SOLUTION INTRAMUSCULAR; INTRAVENOUS PRN
Status: DISCONTINUED | OUTPATIENT
Start: 2023-01-17 | End: 2023-01-17

## 2023-01-17 RX ORDER — HYDROMORPHONE HYDROCHLORIDE 1 MG/ML
0.4 INJECTION, SOLUTION INTRAMUSCULAR; INTRAVENOUS; SUBCUTANEOUS EVERY 5 MIN PRN
Status: DISCONTINUED | OUTPATIENT
Start: 2023-01-17 | End: 2023-01-17 | Stop reason: HOSPADM

## 2023-01-17 RX ORDER — ONDANSETRON 8 MG/1
8 TABLET, ORALLY DISINTEGRATING ORAL EVERY 8 HOURS PRN
Qty: 12 TABLET | Refills: 0 | Status: SHIPPED | OUTPATIENT
Start: 2023-01-17 | End: 2023-01-21

## 2023-01-17 RX ORDER — DEXAMETHASONE SODIUM PHOSPHATE 10 MG/ML
INJECTION, SOLUTION INTRAMUSCULAR; INTRAVENOUS PRN
Status: DISCONTINUED | OUTPATIENT
Start: 2023-01-17 | End: 2023-01-17

## 2023-01-17 RX ORDER — NALOXONE HYDROCHLORIDE 0.4 MG/ML
0.2 INJECTION, SOLUTION INTRAMUSCULAR; INTRAVENOUS; SUBCUTANEOUS
Status: DISCONTINUED | OUTPATIENT
Start: 2023-01-17 | End: 2023-01-17 | Stop reason: HOSPADM

## 2023-01-17 RX ORDER — MEPERIDINE HYDROCHLORIDE 25 MG/ML
12.5 INJECTION INTRAMUSCULAR; INTRAVENOUS; SUBCUTANEOUS
Status: DISCONTINUED | OUTPATIENT
Start: 2023-01-17 | End: 2023-01-17 | Stop reason: HOSPADM

## 2023-01-17 RX ORDER — OXYCODONE HYDROCHLORIDE 5 MG/1
5 TABLET ORAL ONCE
Status: COMPLETED | OUTPATIENT
Start: 2023-01-17 | End: 2023-01-17

## 2023-01-17 RX ORDER — ONDANSETRON 4 MG/1
4 TABLET, ORALLY DISINTEGRATING ORAL EVERY 30 MIN PRN
Status: DISCONTINUED | OUTPATIENT
Start: 2023-01-17 | End: 2023-01-17 | Stop reason: HOSPADM

## 2023-01-17 RX ORDER — EPINEPHRINE 1 MG/ML
INJECTION INTRAMUSCULAR; INTRAVENOUS; SUBCUTANEOUS PRN
Status: DISCONTINUED | OUTPATIENT
Start: 2023-01-17 | End: 2023-01-17 | Stop reason: HOSPADM

## 2023-01-17 RX ORDER — LIDOCAINE 40 MG/G
CREAM TOPICAL
Status: DISCONTINUED | OUTPATIENT
Start: 2023-01-17 | End: 2023-01-17 | Stop reason: HOSPADM

## 2023-01-17 RX ORDER — PROPOFOL 10 MG/ML
INJECTION, EMULSION INTRAVENOUS PRN
Status: DISCONTINUED | OUTPATIENT
Start: 2023-01-17 | End: 2023-01-17

## 2023-01-17 RX ORDER — SODIUM CHLORIDE, SODIUM LACTATE, POTASSIUM CHLORIDE, CALCIUM CHLORIDE 600; 310; 30; 20 MG/100ML; MG/100ML; MG/100ML; MG/100ML
INJECTION, SOLUTION INTRAVENOUS CONTINUOUS PRN
Status: DISCONTINUED | OUTPATIENT
Start: 2023-01-17 | End: 2023-01-17

## 2023-01-17 RX ORDER — PROPOFOL 10 MG/ML
INJECTION, EMULSION INTRAVENOUS CONTINUOUS PRN
Status: DISCONTINUED | OUTPATIENT
Start: 2023-01-17 | End: 2023-01-17

## 2023-01-17 RX ORDER — ECHINACEA PURPUREA EXTRACT 125 MG
2 TABLET ORAL 3 TIMES DAILY
Qty: 100 ML | Refills: 0 | Status: SHIPPED | OUTPATIENT
Start: 2023-01-17 | End: 2023-02-28

## 2023-01-17 RX ORDER — NALOXONE HYDROCHLORIDE 0.4 MG/ML
0.4 INJECTION, SOLUTION INTRAMUSCULAR; INTRAVENOUS; SUBCUTANEOUS
Status: DISCONTINUED | OUTPATIENT
Start: 2023-01-17 | End: 2023-01-17 | Stop reason: HOSPADM

## 2023-01-17 RX ORDER — FENTANYL CITRATE 50 UG/ML
50 INJECTION, SOLUTION INTRAMUSCULAR; INTRAVENOUS EVERY 5 MIN PRN
Status: DISCONTINUED | OUTPATIENT
Start: 2023-01-17 | End: 2023-01-17 | Stop reason: HOSPADM

## 2023-01-17 RX ORDER — FENTANYL CITRATE 50 UG/ML
25 INJECTION, SOLUTION INTRAMUSCULAR; INTRAVENOUS EVERY 5 MIN PRN
Status: DISCONTINUED | OUTPATIENT
Start: 2023-01-17 | End: 2023-01-17 | Stop reason: HOSPADM

## 2023-01-17 RX ORDER — LIDOCAINE HYDROCHLORIDE 10 MG/ML
INJECTION, SOLUTION INFILTRATION; PERINEURAL PRN
Status: DISCONTINUED | OUTPATIENT
Start: 2023-01-17 | End: 2023-01-17

## 2023-01-17 RX ORDER — OXYCODONE HYDROCHLORIDE 5 MG/1
5 TABLET ORAL EVERY 6 HOURS PRN
Qty: 15 TABLET | Refills: 0 | Status: SHIPPED | OUTPATIENT
Start: 2023-01-17 | End: 2023-01-22

## 2023-01-17 RX ADMIN — FENTANYL CITRATE 50 MCG: 50 INJECTION, SOLUTION INTRAMUSCULAR; INTRAVENOUS at 13:58

## 2023-01-17 RX ADMIN — ONDANSETRON 4 MG: 2 INJECTION INTRAMUSCULAR; INTRAVENOUS at 15:29

## 2023-01-17 RX ADMIN — FENTANYL CITRATE 25 MCG: 50 INJECTION, SOLUTION INTRAMUSCULAR; INTRAVENOUS at 16:44

## 2023-01-17 RX ADMIN — PROPOFOL 200 MG: 10 INJECTION, EMULSION INTRAVENOUS at 13:08

## 2023-01-17 RX ADMIN — Medication 50 MG: at 13:08

## 2023-01-17 RX ADMIN — OXYCODONE HYDROCHLORIDE 5 MG: 5 TABLET ORAL at 17:03

## 2023-01-17 RX ADMIN — FENTANYL CITRATE 50 MCG: 50 INJECTION, SOLUTION INTRAMUSCULAR; INTRAVENOUS at 16:29

## 2023-01-17 RX ADMIN — MIDAZOLAM 2 MG: 1 INJECTION INTRAMUSCULAR; INTRAVENOUS at 12:50

## 2023-01-17 RX ADMIN — DEXAMETHASONE SODIUM PHOSPHATE 10 MG: 10 INJECTION, SOLUTION INTRAMUSCULAR; INTRAVENOUS at 13:17

## 2023-01-17 RX ADMIN — LIDOCAINE HYDROCHLORIDE 100 MG: 10 INJECTION, SOLUTION INFILTRATION; PERINEURAL at 13:08

## 2023-01-17 RX ADMIN — Medication 10 MG: at 13:43

## 2023-01-17 RX ADMIN — SODIUM CHLORIDE, SODIUM LACTATE, POTASSIUM CHLORIDE, CALCIUM CHLORIDE: 600; 310; 30; 20 INJECTION, SOLUTION INTRAVENOUS at 13:36

## 2023-01-17 RX ADMIN — PROPOFOL 30 MG: 10 INJECTION, EMULSION INTRAVENOUS at 14:45

## 2023-01-17 RX ADMIN — Medication 20 MG: at 15:25

## 2023-01-17 RX ADMIN — Medication 10 MG: at 14:33

## 2023-01-17 RX ADMIN — FENTANYL CITRATE 50 MCG: 50 INJECTION, SOLUTION INTRAMUSCULAR; INTRAVENOUS at 15:01

## 2023-01-17 RX ADMIN — SUGAMMADEX 200 MG: 100 INJECTION, SOLUTION INTRAVENOUS at 16:02

## 2023-01-17 RX ADMIN — PROPOFOL 50 MCG/KG/MIN: 10 INJECTION, EMULSION INTRAVENOUS at 13:42

## 2023-01-17 RX ADMIN — SUGAMMADEX 200 MG: 100 INJECTION, SOLUTION INTRAVENOUS at 15:47

## 2023-01-17 RX ADMIN — FENTANYL CITRATE 50 MCG: 50 INJECTION, SOLUTION INTRAMUSCULAR; INTRAVENOUS at 14:26

## 2023-01-17 RX ADMIN — SODIUM CHLORIDE, POTASSIUM CHLORIDE, SODIUM LACTATE AND CALCIUM CHLORIDE: 600; 310; 30; 20 INJECTION, SOLUTION INTRAVENOUS at 12:50

## 2023-01-17 RX ADMIN — SODIUM CHLORIDE, POTASSIUM CHLORIDE, SODIUM LACTATE AND CALCIUM CHLORIDE: 600; 310; 30; 20 INJECTION, SOLUTION INTRAVENOUS at 14:17

## 2023-01-17 RX ADMIN — Medication 10 MG: at 14:16

## 2023-01-17 RX ADMIN — Medication 2 G: at 13:20

## 2023-01-17 RX ADMIN — FENTANYL CITRATE 100 MCG: 50 INJECTION, SOLUTION INTRAMUSCULAR; INTRAVENOUS at 13:08

## 2023-01-17 ASSESSMENT — ACTIVITIES OF DAILY LIVING (ADL)
ADLS_ACUITY_SCORE: 35

## 2023-01-17 NOTE — DISCHARGE INSTRUCTIONS
Surgical Discharge Instructions  1. Start rinsing both nasal cavities with Deion-Med Sinus rinse twice daily. This will assist with healing after surgery. If you do not have sinus rinse equipment, you may use ocean nasal saline spray prescribed after surgery; however, sinus rinse is preferred.   2. Take medications as prescribed.    3. You have been prescribed two medications: a narcotic pain medication (oxycodone) to take as needed for pain not controlled with tylenol and a medication for nausea (ondansetron, aka zofran) to use for nausea and/or vomiting.  4. Do not drive or operate machinery while taking narcotic pain medication.   5. For bleeding that is bothersome or excessive, spray afrin (oxymetazoline) nasal spray (four sprays into each nostril) and pinch the tip of the nose closed for 10 minutes. If you find you have done this four times in one hour and are still having bothersome bleeding, please call your doctor.  Afrin has been sent home with you at the day of surgery. If there is blood on the tip of the nasal spray bottle, please don't be alarmed, as this is likely because this was used during or after surgery as you were in the recovery area.   6. Please call your doctor for any headache not controlled with pain medication, severe nausea or vomiting, fevers >100.4, changes in mental status, or any other concerning symptoms you may identify.      Germain Vyas MD    [facility]  [facility]  [facility]  Department of Otolaryngology - Head & Neck Surgery    [facility] [place]  Same-Day Surgery   Adult Discharge Orders & Instructions     For 24 hours after surgery    Get plenty of rest.  A responsible adult must stay with you for at least 24 hours after you leave the hospital.   Do not drive or use heavy equipment.  If you have weakness or tingling, don't drive or use heavy equipment until this  feeling goes away.  Do not drink alcohol.  Avoid strenuous or risky activities.  Ask for help when climbing stairs.   You may feel lightheaded.  IF so, sit for a few minutes before standing.  Have someone help you get up.   If you have nausea (feel sick to your stomach): Drink only clear liquids such as apple juice, ginger ale, broth or 7-Up.  Rest may also help.  Be sure to drink enough fluids.  Move to a regular diet as you feel able.  You may have a slight fever. Call the doctor if your fever is over 100 F (37.7 C) (taken under the tongue) or lasts longer than 24 hours.  You may have a dry mouth, a sore throat, muscle aches or trouble sleeping.  These should go away after 24 hours.  Do not make important or legal decisions.   Call your doctor for any of the followin.  Signs of infection (fever, growing tenderness at the surgery site, a large amount of drainage or bleeding, severe pain, foul-smelling drainage, redness, swelling).    2. It has been over 8 to 10 hours since surgery and you are still not able to urinate (pass water).    3.  Headache for over 24 hours.    4.  Numbness, tingling or weakness the day after surgery (if you had spinal anesthesia).  To contact a doctor, call Dr Vyas's clinic @ [phone] or:    '   [phone] and ask for the resident on call for   Otolaryngology (answered 24 hours a day)  '   Emergency Department:    [facility]: [phone]       (TTY for hearing impaired: [phone])    [facility]: [phone]       (TTY for hearing impaired: [phone])

## 2023-01-17 NOTE — ANESTHESIA CARE TRANSFER NOTE
Patient: Rosalind Murphy    Procedure: Procedure(s):  EXCISION, NEOPLASM, PARANASAL SINUS, ENDOSCOPIC, USING OPTICAL TRACKING SYSTEM       Diagnosis: Nasal obstruction [J34.89]  Atypical facial pain [G50.1]  Diagnosis Additional Information: No value filed.    Anesthesia Type:   General     Note:    Oropharynx: oropharynx clear of all foreign objects and spontaneously breathing  Level of Consciousness: awake and drowsy  Oxygen Supplementation: face mask  Level of Supplemental Oxygen (L/min / FiO2): 6  Independent Airway: airway patency satisfactory and stable  Dentition: dentition unchanged  Vital Signs Stable: post-procedure vital signs reviewed and stable  Report to RN Given: handoff report given  Patient transferred to: PACU    Handoff Report: Identifed the Patient, Identified the Reponsible Provider, Reviewed the pertinent medical history, Discussed the surgical course, Reviewed Intra-OP anesthesia mangement and issues during anesthesia, Set expectations for post-procedure period and Allowed opportunity for questions and acknowledgement of understanding      Vitals:  Vitals Value Taken Time   /85 01/17/23 1608   Temp     Pulse 67 01/17/23 1611   Resp 9 01/17/23 1611   SpO2 100 % 01/17/23 1611   Vitals shown include unvalidated device data.    Electronically Signed By: LUIS ANGEL Ramos CRNA  January 17, 2023  4:13 PM

## 2023-01-17 NOTE — BRIEF OP NOTE
[facility]    Brief Operative Note    Pre-operative diagnosis: Nasal obstruction [J34.89]  Atypical facial pain [G50.1]  Post-operative diagnosis Same as pre-operative diagnosis    Procedure: Procedure(s):  EXCISION, NEOPLASM, PARANASAL SINUS, ENDOSCOPIC, USING OPTICAL TRACKING SYSTEM  Surgeon: Surgeon(s) and Role:     * Germain Vyas MD - Primary     * Diane Brewer MD - Resident - Assisting  Anesthesia: General   Estimated Blood Loss: 100 ml    Drains: None  Specimens:   ID Type Source Tests Collected by Time Destination   1 : Left sinonasal  tumor  Tissue Nose SURGICAL PATHOLOGY EXAM Germain Vyas MD 1/17/2023  2:40 PM    2 : left middle turbinate Tissue Other SURGICAL PATHOLOGY EXAM Germain Vyas MD 1/17/2023  2:50 PM    3 : left olfactory cleft Tissue Other SURGICAL PATHOLOGY EXAM Germain Vyas MD 1/17/2023  2:53 PM      Findings:   None.  Complications: None.  Implants:   Implant Name Type Inv. Item Serial No.  Lot No. LRB No. Used Action   IMP SINUS PROPEL MINI MOMETASONE FUORATE [id number] [id number] - [id number] Other IMP SINUS PROPEL MINI MOMETASONE FUORATE [id number] 16MM [id number] INTERSECT ENT [id number] Left 1 Implanted     Diane Brewer MD PGY4  ENT Resident

## 2023-01-17 NOTE — ANESTHESIA PREPROCEDURE EVALUATION
Anesthesia Pre-Procedure Evaluation    Patient: Rosalind Murphy   MRN: [id number] : 1970        Procedure : Procedure(s):  EXCISION, NEOPLASM, PARANASAL SINUS, ENDOSCOPIC, USING OPTICAL TRACKING SYSTEM          Past Medical History:   Diagnosis Date     Hypothyroidism      Motion sickness      PONV (postoperative nausea and vomiting)      Primary adenocarcinoma of nasal cavity (H) 2022      Past Surgical History:   Procedure Laterality Date     CATARACT IOL, RT/LT Left     as a child     HYSTERECTOMY  2015     LAPAROTOMY EXPLORATORY  2015     ND BIOPSY NASAL LESION  2022     RETINAL DETACHMENT SURGERY Left 2019     TONSILLECTOMY      age 6      Allergies   Allergen Reactions     Doxycycline Headache and Nausea     And Headaches       Hydroxychloroquine Headache and Tinnitus     Topiramate Tinnitus     Worsening Tinnitus        Social History     Tobacco Use     Smoking status: Never     Smokeless tobacco: Never   Substance Use Topics     Alcohol use: Yes     Comment: social use per patient      Wt Readings from Last 1 Encounters:   23 83.6 kg (184 lb 4.9 oz)        Anesthesia Evaluation            ROS/MED HX  ENT/Pulmonary: Comment: Nasal tumor see ENT note      Neurologic:  - neg neurologic ROS     Cardiovascular:  - neg cardiovascular ROS     METS/Exercise Tolerance:     Hematologic:  - neg hematologic  ROS     Musculoskeletal:  - neg musculoskeletal ROS     GI/Hepatic:  - neg GI/hepatic ROS     Renal/Genitourinary:  - neg Renal ROS     Endo:     (+) thyroid problem,     Psychiatric/Substance Use:  - neg psychiatric ROS     Infectious Disease:  - neg infectious disease ROS     Malignancy:   (+) Malignancy,     Other:            Physical Exam    Airway        Mallampati: II   TM distance: > 3 FB   Neck ROM: full   Mouth opening: > 3 cm    Respiratory Devices and Support         Dental  no notable dental history         Cardiovascular   cardiovascular exam normal          Pulmonary    pulmonary exam normal                OUTSIDE LABS:  CBC: No results found for: WBC, HGB, HCT, PLT  BMP:   Lab Results   Component Value Date    CR 0.8 01/03/2023     COAGS: No results found for: PTT, INR, FIBR  POC: No results found for: BGM, HCG, HCGS  HEPATIC: No results found for: ALBUMIN, PROTTOTAL, ALT, AST, GGT, ALKPHOS, BILITOTAL, BILIDIRECT, FENG  OTHER: No results found for: PH, LACT, A1C, NERY, PHOS, MAG, LIPASE, AMYLASE, TSH, T4, T3, CRP, SED    Anesthesia Plan    ASA Status:  2   NPO Status:  NPO Appropriate    Anesthesia Type: General.   Induction: Intravenous.   Maintenance: Balanced.        Consents    Anesthesia Plan(s) and associated risks, benefits, and realistic alternatives discussed. Questions answered and patient/representative(s) expressed understanding.    - Discussed:     - Discussed with:  Patient         Postoperative Care       PONV prophylaxis: Ondansetron (or other 5HT-3), Dexamethasone or Solumedrol, Background Propofol Infusion     Comments:                Robb Ch MD

## 2023-01-17 NOTE — ANESTHESIA PROCEDURE NOTES
Airway       Patient location during procedure: OR       Procedure Start/Stop Times: 1/17/2023 1:12 PM  Staff -        Anesthesiologist:  Robb Ch MD       CRNA: Theodore Quintero APRN CRNA       Other Anesthesia Staff: Henry Sheehan       Performed By: SRNA  Consent for Airway        Urgency: elective  Indications and Patient Condition       Indications for airway management: ruben-procedural         Mask difficulty assessment: 2 - vent by mask + OA or adjuvant +/- NMBA    Final Airway Details       Final airway type: endotracheal airway       Successful airway: ETT - single  Endotracheal Airway Details        ETT size (mm): 7.0       Cuffed: yes       Successful intubation technique: direct laryngoscopy       DL Blade Type: Nunes 2       Grade View of Cords: 2       Adjucts: stylet and bougie       Position: Left       Measured from: gums/teeth       Secured at (cm): 22       Bite block used: None    Post intubation assessment        Placement verified by: capnometry, equal breath sounds and chest rise        Number of attempts at approach: 1       Number of other approaches attempted: 0       Secured with: pink tape       Ease of procedure: easy       Dentition: Intact    Medication(s) Administered   Medication Administration Time: 1/17/2023 1:12 PM

## 2023-01-17 NOTE — ANESTHESIA POSTPROCEDURE EVALUATION
Patient: Rosalind Murphy    Procedure: Procedure(s):  EXCISION, NEOPLASM, PARANASAL SINUS, ENDOSCOPIC, USING OPTICAL TRACKING SYSTEM       Anesthesia Type:  General    Note:  Disposition: Outpatient   Postop Pain Control: Uneventful            Sign Out: Well controlled pain   PONV: No   Neuro/Psych: Uneventful            Sign Out: Acceptable/Baseline neuro status   Airway/Respiratory: Uneventful            Sign Out: Acceptable/Baseline resp. status   CV/Hemodynamics: Uneventful            Sign Out: Acceptable CV status; No obvious hypovolemia; No obvious fluid overload   Other NRE: NONE   DID A NON-ROUTINE EVENT OCCUR?            Last vitals:  Vitals Value Taken Time   /84 01/17/23 1700   Temp 36.6  C (97.8  F) 01/17/23 1700   Pulse 62 01/17/23 1711   Resp 42 01/17/23 1711   SpO2 94 % 01/17/23 1711   Vitals shown include unvalidated device data.    Electronically Signed By: Peter Schwartz MD  January 17, 2023  5:12 PM

## 2023-01-18 ENCOUNTER — TELEPHONE (OUTPATIENT)
Dept: OTOLARYNGOLOGY | Facility: CLINIC | Age: 53
End: 2023-01-18
Payer: COMMERCIAL

## 2023-01-19 LAB
PATH REPORT.COMMENTS IMP SPEC: ABNORMAL
PATH REPORT.COMMENTS IMP SPEC: YES
PATH REPORT.FINAL DX SPEC: ABNORMAL
PATH REPORT.GROSS SPEC: ABNORMAL
PATH REPORT.MICROSCOPIC SPEC OTHER STN: ABNORMAL
PATH REPORT.RELEVANT HX SPEC: ABNORMAL
PATH REPORT.RELEVANT HX SPEC: ABNORMAL
PATH REPORT.SITE OF ORIGIN SPEC: ABNORMAL

## 2023-01-19 NOTE — TELEPHONE ENCOUNTER
I called Rosalind by telephone. We again reviewed my intraoperative findings from yesterday's surgery which demonstrated tumor present in the olfactory cleft adjacent to the cribriform plate. We discussed need for additional surgery for dural resection + margins and likely post-operative radiation. She and her  were allowed to ask additional questions, which I did answer to the best of my ability. I will plan to see her next week in conjunction with my NSGY skull base colleague, Dr. Tidwell. They both were appreciative of the call.     Germain Vyas MD    [facility]  [facility]  [facility]  Department of Otolaryngology - Head & Neck Surgery

## 2023-01-19 NOTE — OP NOTE
Procedure Date: 01/17/2023    PREOPERATIVE DIAGNOSES:     1.  Left sinonasal adenocarcinoma.  2.  Nasal obstruction.  3.  Chronic sinusitis.    PREOPERATIVE DIAGNOSES:    1.  Left sinonasal adenocarcinoma.  2.  Nasal obstruction.  3.  Chronic sinusitis.    PROCEDURE PERFORMED:    1.  Left endoscopic partial resection of sinonasal adenocarcinoma including:   Frontal sinusotomy, total ethmoidectomy.  Maxillary antrostomy.  Sphenoidotomy.  2.  Computerized image guidance, extradural.    SURGEON:  Germain Vyas MD    RESIDENT:  Diane Brewer MD    INDICATIONS FOR PROCEDURE:  Rosalind Murphy is a 52-year-old woman who had several months of nasal obstruction and was found to have a left sided sinonasal mass.  The patient had a biopsy-proven left sinonasal adenocarcinoma.  She was a candidate for surgery as listed above.  I did discuss this case at our multidisciplinary tumor board where preoperative imaging, pathology, and staging imaging had been reviewed.  All agreed that surgery was appropriate.  We discussed all the risks, benefits and alternatives of surgery at length and both her and her  were allowed to ask a number of insightful questions.  I did answer to the best of my ability.  After this discussion, written informed consent was obtained.    DESCRIPTION OF PROCEDURE IN DETAIL:  The patient was identified in the preoperative holding area.  Consent was confirmed.  She was subsequently brought back to the operating room where general anesthesia was induced and she was intubated without issue.  The eyes were protected, a timeout was performed and all were in agreement.  She was subsequently turned 180 degrees and prepped and draped in standard fashion in preparation for endoscopic sinus surgery.  I performed a contour-based image guidance registration using Jukedeck image guidance registration system.  Several landmarks across the facial skeleton were used to confirm a good target registration with  minimal error.  This was used throughout the case to confirm important landmarks including the skull base, orbit as well as to delineate the limits of our tumor resection.    We started the procedure by decongesting the left nasal cavity with 1:100,000 epinephrine-soaked pledgets.  We injected the middle turbinate, lateral nasal wall and inferior turbinate with 1% lidocaine with 1:100,000 epinephrine.  The tumor was visualized in the posterior right nasal cavity; however, that the attachment site could not be immediately determined from initial inspection and palpation.  Tumor was debulked posteriorly towards the nasopharynx and laterally towards the lateral nasal wall.  We immediately found that the tumor was attached to the nasal septum.  We resected the tumor superiorly towards the nasal septum and it extended up superiorly towards the olfactory cleft.  We resected tumor superiorly towards the olfactory cleft and identified tumor within the olfactory cleft high, just adjacent to the cribriform plate.  This was concerning for superior extension intracranially and thus we determined that we could likely not extend the resection superiorly without a dural resection.  We decided to send a frozen section for pathologic analysis to determine if there was in fact tumor extension superiorly towards the cribriform plate.  Then we examined the left side sinus cavity.  A maxillary antrostomy was then performed using a pediatric backbiter, straight Thru-Cutting forceps as well as a microdebrider.  We examined the maxillary sinus and there was no evidence of arcelia tumor within, just mucosal edema.  There was a una bullosa of the middle turbinate, which was resected using straight Thru-Cutting forceps as well as microdebrider.  Then, a total ethmoidectomy was then performed.  The bulla ethmoidalis, basal lamella and several posterior ethmoid partitions were removed using a combination of angled Thru-Cutting forceps as well  as microdebrider.  There was no evidence of extension of tumor into the ethmoid sinus cavity; however, there was just postobstructive edema and inspissated secretions in this area.  We then identified the sphenoid ostium medial to the superior turbinate and dilated it using a Welch elevator.  The lower half of the superior turbinate was resected using straight Thru-Cutting forceps.  We opened the sphenoid sinus widely using a combination of straight Hosemann punch, angled Thru-Cutting forceps as well as microdebrider.  There was only edematous mucosa of the sphenoid sinus, but no evidence of arcelia tumor invasion.  We continued our dissection anteriorly, removing additional partitions off the ethmoid roof skull base.  We identified the frontal recess draining out laterally to the attachment of the uncinate on the agger nasi.  The face of the agger nasi resected using a frontal Kerrison rongeur.  The frontal sinus was identified and opened widely using a combination of upbiting giraffes and a curved shaver.  There was no evidence of arcelia tumor progression within the frontal sinus.  The middle turbinate was resected superiorly towards the skull base, the superior turbinate was resected superiorly towards the skull base using the straight Thru-Cutting forceps and by this time, we had received notification from the pathology lab that there was in fact gross tumor which was present at the olfactory cleft just adjacent to the cribriform plate.  We thus did not feel confident that we could complete the tumor resection surgery without getting dural margin superior to the olfactory cleft, so she would requir dural resection at a later date with our neurosurgery colleagues.  We thus decided to conclude the surgery.  A steroid-eluting stent was placed in the frontal ostium.  A NasoPore sponge was placed in the middle meatus.  An orogastric tube was placed in the stomach and the contents were suctioned.  The patient was  subsequently turned over to Anesthesia for awakening.  I did speak with the  and son after surgery regarding the outcome and plans for additional surgery.  I also spoke with the patient in the PACU and did discuss with her the intraoperative findings and plans for need for additional surgery.  There were no immediate complications.    Germain Vyas MD        D: 2023   T: 2023   MT: SPMT    Name:     VINITA GILLIS  MRN:      [id number]        Account:        [id number]   :      1970           Procedure Date: 2023     Document: [id number]

## 2023-01-23 LAB
PATH REPORT.COMMENTS IMP SPEC: ABNORMAL
PATH REPORT.COMMENTS IMP SPEC: YES
PATH REPORT.FINAL DX SPEC: ABNORMAL
PATH REPORT.GROSS SPEC: ABNORMAL
PATH REPORT.INTRAOP OBS SPEC DOC: ABNORMAL
PATH REPORT.MICROSCOPIC SPEC OTHER STN: ABNORMAL
PATH REPORT.RELEVANT HX SPEC: ABNORMAL
PHOTO IMAGE: ABNORMAL

## 2023-01-24 ASSESSMENT — ENCOUNTER SYMPTOMS
SINUS PAIN: 0
SMELL DISTURBANCE: 1
SINUS CONGESTION: 1
SORE THROAT: 0
HOARSE VOICE: 0
NECK MASS: 0
TROUBLE SWALLOWING: 0
TASTE DISTURBANCE: 0

## 2023-01-24 NOTE — PROGRESS NOTES
[facility]      Name: Rosalind Murphy  : 1970  Referring provider: Germain Vyas  2023      Reason for visit: Skull base intestinal type adenocarcinoma, new patient visit    Dear Dr. Crowley,     It was a pleasure to see Ms. Murphy in the [facility] today as a new patient.  As you recall, Ms. Murphy is a 52 year old right-handed female who has a history of intestinal type adenocarcinoma s/p excision on 23 by Dr. Vyas found to have tumor present at the olfactory cleft just adjacent to the cribriform plate at time of surgery.     The patient was evaluated initially on 22 by Dr. Crawley for several months of increasing left sided nasal breathing difficulty, facial pressure, and discolored and foul smelling drainage. Despite use of allergy medications, patient had not improvement, so an CT was done on 12/15/22, which showed an obstruction in the left nasal cavity. Patient then underwent biopsy on 22, which demonstrated moderately differentiated intestinal type adenocarcinoma. PET scan was negative and MRI was done, and she was then seen by Dr. Vyas on 23. Patient's case was discussed at tumor board with surgical excision potentially followed by adjuvant treatment was recommended. She underwent surgery on 23, at which time she was found to have tumor present in the olfactory cleft adjacent to the cribriform plate, necessitating dural resection and referral to here. Patient was referred to us to discuss surgery in combination with Dr. Vyas's team.    Today, she reports improved breathing post surgery. Patient does not use a CPAP. She denies woodworking in the past.      Review of Systems:   Pertinent items are noted in HPI or as in patient entered ROS below, remainder of complete ROS is negative.     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 Presents with , and he has 4 kids. Their grand baby was born recently and is 7 weeks old now. Originally from [place].      Physical Exam:   General: No acute distress.   Head: No signs of trauma.    Eyes: Conjunctivae are normal.  Mouth/Throat: Oropharynx moist.  Neck: Normal range of motion.    Resp: No respiratory distress.   MSK: Moves all extremities.  No obvious deformity.  Neuro: The patient is fully oriented and quite pleasant. Speech normal. Left vision worse than right. Extraocular movements are intact without nystagmus. Facial sensation is intact in V1, V2, V3 distributions. Facial nerve function is normal.  Palate is symmetric. Shoulders are full strength. Tongue is midline. There is no pronator drift. Full strength in all extremities. Sensation intact throughout. No dysmetria with finger-nose-finger testing.   Psych: Normal mood and affect. Behavior is normal.      Imaging:  We reviewed her recent MRI dated 1/3/23.    MR Skull Base  IMPRESSION: 3.2 x 1.4 x 3.1 cm left posterior nasal cavity mass  compatible with pathology proven adenocarcinoma.    Laboratory:  Surgical Path (1/17/23)  Final Diagnosis  A. NOSE, LEFT SINONASAL TUMOR, EXCISION:  - SINONASAL ADENOCARCINOMA, INTESTINAL TYPE  - See comment     B. NOSE, LEFT MIDDLE TURBINATE, EXCISION:  - Fragments of benign respiratory mucosa and lamellar bone  - No evidence of malignancy     C. NOSE, LEFT OLFACTORY CLEFT, EXCISION:  - SINONASAL ADENOCARCINOMA, INTESTINAL  TYPE    Assessment:  1. ITAC s/p excision of the sinonasal portion 1/17/23   2. Plan for anterior skull base resection for ITAC at anterior fossa    Plan:  1. We reviewed the imaging findings and walked through the surgical process in detail.  In combination with Dr. Vyas's team, we will perform a transcribriform resection including dura and the left olfactory nerve with a goal to achieve negative margins. If additional resection is necessary including involvement on the right side, she was in agreement that the right olfactory nerve may need to be resected as well if it appeared involved by the tumor.   We reviewed reconstruction including the potential need for fascia elsy graft and/or lumbar drain placement or abdominal fat graft in addition to a nasoseptal flap.  2. We reviewed risks including bleeding, infection, CSF leak, anosmia/changes in taste, neurologic injury, stroke, incomplete resection or positive margins.  including risks, benefits, and perioperative expectations.  3. Patient agrees to proceed with surgery, which is planned for Feb 2.  4. Ms. Murphy and her  asked excellent questions which I answered to the best of my ability. I encouraged her to contact us should any questions or concerns arise in advance of surgery.    It has been a pleasure to participate in the care of your patient. Please feel free to contact us if we may be of any assistance for Ms. Murphy.      Abhi Tidwell MD   Department of Neurosurgery  [facility] Sk[facility] Pituitary Surgery  [facility]         45 minutes spent on the date of the encounter doing chart review, review of outside records, review of test results, interpretation of tests, patient visit, documentation and discussion with other provider(s)      Scribe Disclosure:  I, Annita Palmer, am serving as a scribe to document services personally performed by Abhi Tidwell MD at this visit, based upon the provider's statements to me. All  documentation has been reviewed by the aforementioned provider prior to being entered into the official medical record.

## 2023-01-25 ENCOUNTER — OFFICE VISIT (OUTPATIENT)
Dept: OTOLARYNGOLOGY | Facility: CLINIC | Age: 53
End: 2023-01-25
Payer: COMMERCIAL

## 2023-01-25 ENCOUNTER — PRE VISIT (OUTPATIENT)
Dept: NEUROSURGERY | Facility: CLINIC | Age: 53
End: 2023-01-25

## 2023-01-25 ENCOUNTER — OFFICE VISIT (OUTPATIENT)
Dept: NEUROSURGERY | Facility: CLINIC | Age: 53
End: 2023-01-25
Payer: COMMERCIAL

## 2023-01-25 VITALS
BODY MASS INDEX: 27.67 KG/M2 | SYSTOLIC BLOOD PRESSURE: 108 MMHG | OXYGEN SATURATION: 97 % | WEIGHT: 182 LBS | RESPIRATION RATE: 16 BRPM | HEART RATE: 67 BPM | DIASTOLIC BLOOD PRESSURE: 74 MMHG

## 2023-01-25 VITALS
OXYGEN SATURATION: 97 % | HEART RATE: 73 BPM | HEIGHT: 68 IN | DIASTOLIC BLOOD PRESSURE: 75 MMHG | WEIGHT: 182 LBS | TEMPERATURE: 98.6 F | SYSTOLIC BLOOD PRESSURE: 113 MMHG | BODY MASS INDEX: 27.58 KG/M2

## 2023-01-25 DIAGNOSIS — C80.1 ADENOCARCINOMA (H): ICD-10-CM

## 2023-01-25 DIAGNOSIS — D09.9 SQUAMOUS CELL CARCINOMA IN SITU: Primary | ICD-10-CM

## 2023-01-25 DIAGNOSIS — J34.89 NASAL OBSTRUCTION: Primary | ICD-10-CM

## 2023-01-25 DIAGNOSIS — C31.9 SINUS CANCER WITH INTRACRANIAL EXTENSION (H): ICD-10-CM

## 2023-01-25 DIAGNOSIS — C79.31 SINUS CANCER WITH INTRACRANIAL EXTENSION (H): ICD-10-CM

## 2023-01-25 PROCEDURE — 99207 PR CDG-PROCEDURE CHARGE ONLY: CPT | Performed by: OTOLARYNGOLOGY

## 2023-01-25 PROCEDURE — 99204 OFFICE O/P NEW MOD 45 MIN: CPT | Performed by: NEUROLOGICAL SURGERY

## 2023-01-25 PROCEDURE — 31237 NSL/SINS NDSC SURG BX POLYPC: CPT | Mod: LT | Performed by: OTOLARYNGOLOGY

## 2023-01-25 RX ORDER — LEVOTHYROXINE SODIUM 75 MCG
1 TABLET ORAL
Status: ON HOLD | COMMUNITY
Start: 2023-01-17 | End: 2023-02-03

## 2023-01-25 ASSESSMENT — PAIN SCALES - GENERAL
PAINLEVEL: NO PAIN (0)
PAINLEVEL: NO PAIN (0)

## 2023-01-25 NOTE — NURSING NOTE
Nurse Teaching - Skull Base Surgery  Relevant Diagnosis: sinonasal adenocarcinoma  Teaching Topic: pre/ruben/postop education - endoscopic endonasal surgery  Person(s) involved in teaching: Patient and her spouse     Reviewed all instructions in AVS, including hospital COVID-19 restrictions, NPO prior to surgery, showering before surgery instructions (has antiseptic surgical soap), healthcare directive, postoperative cares, activity restrictions, pain management, and postop appointments. Patient has writer's direct contact information and was encouraged to call with any questions or concerns prior to surgery.

## 2023-01-25 NOTE — LETTER
"1/25/2023       RE: Rosalind Murphy  [address] [facility] [address]     Dear Colleague,    Thank you for referring your patient, Rosalind Murphy, to the [facility] [place] [facility]. Please see a copy of my visit note below.      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 She does have some neurological sensations over the upper gums and teeth. Nasal obstruction improved since surgery. No major pain or bleeding.     Sino-Nasal Outcome Test (SNOT - 22)  DNT    [place] Operative History  Procedure Date: 01/17/2023     PREOPERATIVE DIAGNOSES:     1.  Left sinonasal adenocarcinoma.  2.  Nasal obstruction.  3.  Chronic sinusitis.     PREOPERATIVE DIAGNOSES:    1.  Left sinonasal adenocarcinoma.  2.  Nasal obstruction.  3.  Chronic sinusitis.     PROCEDURE PERFORMED:    1.  Left endoscopic partial resection of sinonasal adenocarcinoma including:   Frontal sinusotomy, total ethmoidectomy.  Maxillary antrostomy.  Sphenoidotomy.  2.  Computerized image guidance, extradural.     SURGEON:  Germain Vyas MD     RESIDENT:  Diane Brewer MD    Review of systems: A 14-point review of systems has been conducted and is negative for any notable symptoms, except as dictated in the history of present illness.     Physical Exam:  Vital signs: /75 (BP Location: Left arm, Patient Position: Sitting, Cuff Size: Adult Regular)   Pulse 73   Temp 98.6  F (37  C)   Ht 1.727 m (5' 8\")   Wt 82.6 kg (182 lb)   SpO2 97%   BMI 27.67 kg/m     General Appearance: No acute distress, appropriate demeanor, conversant  Eyes: moist conjunctivae; EOMI; " pupils symmetric; visual acuity grossly intact; no proptosis  Head: normocephalic; overall symmetric appearance without deformity  Face: overall symmetric without deformity; HB I/VI  Nose: No external deformity; see endoscopy  Lungs: symmetric chest rise; no wheezing  CV: Good distal perfusion; normal hear rate  Extremities: No deformity  Neurologic Exam: Cranial nerves II-XII are grossly intact; no focal deficit     Procedure Note  Procedure performed: Rigid nasal endoscopy with left-sided debridement  Indication: To evaluate for sinonasal pathology not visualized on routine anterior rhinoscopy  Anesthesia: 4% topical lidocaine with 0.05 % oxymetazoline  Description of procedure: A 30 degree, 3 mm rigid endoscope was inserted into bilateral nasal cavities and the nasal valves, nasal cavity, middle meatus, sphenoethmoid recess, nasopharynx were evaluated for evidence of obstruction, edema, purulence, polyps and/or mass/lesion.     I then used a combination of non-cutting forceps, straight and curved suction to clear the left surgical cavities of packing, clot, crust, and fibrinopurulent debris.      Findings  No signs of infection; post surgical findings as expected    The patient tolerated the procedure well without complication.     Laboratory Review:  n/a     Imaging Review:  CT sinus 12/15/2022:  1.  The left nasal cavity is opacified along its mid to posterior   aspect with extension of soft tissue into the left posterior   nasopharynx. There is questionable remodeling of the posterolateral   wall of the left nasal cavity. Is an underlying lesion at this   location is possible and correlation with direct visualization is   advised.        MRI skull base w and w/o contrast 1/2/2023  IMPRESSION: 3.2 x 1.4 x 3.1 cm left posterior nasal cavity mass  compatible with pathology proven adenocarcinoma..     PET/CT 1/3/2023:  IMPRESSION: In this patient with biopsy-proven left nasal cavity  adenocarcinoma:  1. No  evidence of metastasis in the chest, abdomen or pelvis.  2. Please refer to dedicated report for findings in the head and neck  region.     Impression:   1. 2.8 x 2.7 x 1.4 cm hypermetabolic mass in the left posterior nasal  cavity compatible with pathology proven adenocarcinoma.  2. No cervical lymphadenopathy.   3. Please refer to the whole body PET CT performed as a separate  report, for the findings of the remainder of the body.        *I have personally reviewed these images and agree with the radiologist's interpretation*        Pathology Review:  Final Diagnosis 1/17/23  A. NOSE, LEFT SINONASAL TUMOR, EXCISION:  - SINONASAL ADENOCARCINOMA, INTESTINAL TYPE  - See comment  B. NOSE, LEFT MIDDLE TURBINATE, EXCISION:  - Fragments of benign respiratory mucosa and lamellar bone  - No evidence of malignancy  C. NOSE, LEFT OLFACTORY CLEFT, EXCISION:  - SINONASAL ADENOCARCINOMA, INTESTINAL TYPE      Nasal mass biopsy - 12/23/2022  Final Diagnosis  NASAL MASS, BIOPSY:   1. Moderately-differentiated adenocarcinoma, favor     sinonasal adenocarcinoma, non-intestinal type         Assessment/Medical Decision Making:   left olfactory cleft ITAC  S/p stage 1 surgery  Nasal obstruction secondary to above  Atypical facial pain      Plan:  Patient presenting s/p adenocarcinoma resection on 1/17/23 at which extension to the olfactory cleft and a second stage surgery was elected as dural margins will be needed. Her upcoming surgery with transcribiform approach will be in conjunction with Dr. Tidwell of neurosurgery. The risks of smell loss, perioperative infection, ICU stay, and CSF leak were discussed with her. She is in agreement with the plan and will proceed with surgery on 2/2/23. Continue with saline rinses in meantime. She knows to reach out to me with any additional questions or concerns.    Germain Vyas MD    [facility]  Rhinology, Endoscopic Skull Base Surgery  [facility]  [place]  Department of Otolaryngology - Head & Neck Surgery    Scribe Disclosure:  I Eduin Felix, am serving as a scribe to document services personally performed by Germain Vyas MD at this visit, based upon the provider's statements to me. All documentation has been reviewed by the aforementioned provider prior to being entered into the official medical record.     Additional portions of the patient's history have been reviewed below.   ~~~~~~~~~~~~~~~~~~~~~~~~~~~~~~~~~~~~~~~~~~~~~~~~~~~~~~~~~~~~~~~~~~~~~~~~~~~~~~~~~~~~~~~~~~~~~~~~~~~~~~~~~~~~~~~~~~~~~~~~~~~~~~~~~~~~~~~    Past Medical History:   Diagnosis Date     Hypothyroidism      Motion sickness      PONV (postoperative nausea and vomiting)      Primary adenocarcinoma of nasal cavity (H) 12/23/2022        Past Surgical History:   Procedure Laterality Date     CATARACT IOL, RT/LT Left     as a child     HYSTERECTOMY  2015     LAPAROTOMY EXPLORATORY  2015     OPTICAL TRACKING SYSTEM ENDOSCOPIC EXCISION SINUS TUMOR Left 1/17/2023    Procedure: EXCISION, NEOPLASM, PARANASAL SINUS, ENDOSCOPIC, USING OPTICAL TRACKING SYSTEM;  Surgeon: Germain Vyas MD;  Location: [facility] OR     [place] BIOPSY NASAL LESION  12/23/2022     RETINAL DETACHMENT SURGERY Left 2019     TONSILLECTOMY      age 6        Family History   Problem Relation Age of Onset     Breast Cancer Mother      Breast Cancer Maternal Aunt      Breast Cancer Maternal Aunt      Lung Cancer Maternal Aunt      Bladder Cancer Maternal Aunt      Cancer Maternal Uncle         Eye        Social History     Socioeconomic History     Marital status:      Spouse name: None     Number of children: None     Years of education: None     Highest education level: None   Tobacco Use     Smoking status: Never     Smokeless tobacco: Never   Substance and Sexual Activity     Alcohol use: Yes     Comment: social use per patient     Drug use: Never           Again, thank you for allowing me to participate in the care  of your patient.      Sincerely,    Germain Vyas MD

## 2023-01-25 NOTE — LETTER
[place] [facility]  [facility] NEUROSURGERY CLINIC [address]  3RD FLOOR  [facility] [address]  Phone: [phone]  Fax: [phone]          2023    RE:   Rosalind Murphy  [address] [facility] [address]      Dear Colleague,    Thank you for referring your patient, Rosalind Murphy, to the [facility]. Please see a copy of my visit note below.        [place] [facility]      Name: Rosalind Murphy  : 1970  Referring provider: Germain Vyas  2023      Reason for visit: Skull base intestinal type adenocarcinoma, new patient visit    Dear Dr. Crowley,     It was a pleasure to see Ms. Murphy in the [facility] today as a new patient.  As you recall, Ms. Murphy is a 52 year old right-handed female who has a history of intestinal type adenocarcinoma s/p excision on 23 by Dr. Vyas found to have tumor present at the olfactory cleft just adjacent to the cribriform plate at time of surgery.     The patient was evaluated initially on 22 by Dr. Crawley for several months of increasing left sided nasal breathing difficulty, facial pressure, and discolored and foul smelling drainage. Despite use of allergy medications, patient had not improvement, so an CT was done on 12/15/22, which showed an obstruction in the left nasal cavity. Patient then underwent biopsy on 22, which demonstrated moderately differentiated intestinal type adenocarcinoma. PET scan was negative and MRI was done, and she was then seen by Dr. Vyas on 23. Patient's case was discussed at tumor board with surgical excision potentially followed by adjuvant treatment was recommended. She underwent surgery on 23, at which time she was found to have tumor present in the olfactory cleft adjacent to the cribriform plate, necessitating dural resection and referral to here.  Patient was referred to us to discuss surgery in combination with Dr. Vyas's team.    Today, she reports improved breathing post surgery. Patient does not use a CPAP. She denies woodworking in the past.      Review of Systems:   Pertinent items are noted in HPI or as in patient entered ROS below, remainder of complete ROS is negative.     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 Presents with , and he has 4 kids. Their grand baby was born recently and is 7 weeks old now. Originally from [place].      Physical Exam:   General: No acute distress.   Head: No signs of trauma.    Eyes: Conjunctivae are normal.  Mouth/Throat: Oropharynx moist.  Neck: Normal range of motion.    Resp: No respiratory distress.   MSK: Moves all extremities.  No obvious deformity.  Neuro: The patient is fully oriented and quite pleasant. Speech normal. Left vision worse than right. Extraocular movements are intact without nystagmus. Facial sensation is intact in V1, V2, V3 distributions. Facial nerve function is normal.  Palate is symmetric. Shoulders are full strength. Tongue is midline. There is no pronator drift. Full strength in all extremities. Sensation intact throughout. No dysmetria with finger-nose-finger testing.   Psych: Normal mood and affect. Behavior is normal.      Imaging:  We reviewed her recent MRI dated 1/3/23.    MR Skull Base  IMPRESSION: 3.2 x 1.4 x 3.1 cm left posterior nasal  cavity mass  compatible with pathology proven adenocarcinoma.    Laboratory:  Surgical Path (1/17/23)  Final Diagnosis  A. NOSE, LEFT SINONASAL TUMOR, EXCISION:  - SINONASAL ADENOCARCINOMA, INTESTINAL TYPE  - See comment     B. NOSE, LEFT MIDDLE TURBINATE, EXCISION:  - Fragments of benign respiratory mucosa and lamellar bone  - No evidence of malignancy     C. NOSE, LEFT OLFACTORY CLEFT, EXCISION:  - SINONASAL ADENOCARCINOMA, INTESTINAL TYPE    Assessment:  1. ITAC s/p excision of the sinonasal portion 1/17/23   2. Plan for anterior skull base resection for ITAC at anterior fossa    Plan:  1. We reviewed the imaging findings and walked through the surgical process in detail.  In combination with Dr. Vyas's team, we will perform a transcribriform resection including dura and the left olfactory nerve with a goal to achieve negative margins. If additional resection is necessary including involvement on the right side, she was in agreement that the right olfactory nerve may need to be resected as well if it appeared involved by the tumor.   We reviewed reconstruction including the potential need for fascia elsy graft and/or lumbar drain placement or abdominal fat graft in addition to a nasoseptal flap.  2. We reviewed risks including bleeding, infection, CSF leak, anosmia/changes in taste, neurologic injury, stroke, incomplete resection or positive margins.  including risks, benefits, and perioperative expectations.  3. Patient agrees to proceed with surgery, which is planned for Feb 2.  4. Ms. Murphy and her  asked excellent questions which I answered to the best of my ability. I encouraged her to contact us should any questions or concerns arise in advance of surgery.    It has been a pleasure to participate in the care of your patient. Please feel free to contact us if we may be of any assistance for Ms. Murphy.      Abhi Tidwell MD   Department of Neurosurgery  [facility]  [facility]         45 minutes spent on the date of the encounter doing chart review, review of outside records, review of test results, interpretation of tests, patient visit, documentation and discussion with other provider(s)      Scribe Disclosure:  I, Annita Palmer, am serving as a scribe to document services personally performed by Abhi Tidwell MD at this visit, based upon the provider's statements to me. All documentation has been reviewed by the aforementioned provider prior to being entered into the official medical record.      Sincerely,    Abhi Tidwell MD

## 2023-01-25 NOTE — PROGRESS NOTES
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 She does have some neurological sensations over the upper gums and teeth. Nasal obstruction improved since surgery. No major pain or bleeding.     Sino-Nasal Outcome Test (SNOT - 22)  [facility] Operative History  Procedure Date: 01/17/2023     PREOPERATIVE DIAGNOSES:     1.  Left sinonasal adenocarcinoma.  2.  Nasal obstruction.  3.  Chronic sinusitis.     PREOPERATIVE DIAGNOSES:    1.  Left sinonasal adenocarcinoma.  2.  Nasal obstruction.  3.  Chronic sinusitis.     PROCEDURE PERFORMED:    1.  Left endoscopic partial resection of sinonasal adenocarcinoma including:   Frontal sinusotomy, total ethmoidectomy.  Maxillary antrostomy.  Sphenoidotomy.  2.  Computerized image guidance, extradural.     SURGEON:  Germain Vyas MD     RESIDENT:  Diaen Brewer MD    Review of systems: A 14-point review of systems has been conducted and is negative for any notable symptoms, except as dictated in the history of present illness.     Physical Exam:  Vital signs: /75 (BP Location: Left arm, Patient Position: Sitting, Cuff Size: Adult Regular)   Pulse 73   Temp 98.6  F (37  C)   Ht 1.727 m (5' 8\")   Wt 82.6 kg (182 lb)   SpO2 97%   BMI 27.67 kg/m     General Appearance: No acute distress, appropriate demeanor, conversant  Eyes: moist conjunctivae; EOMI; pupils symmetric; visual acuity grossly intact; no proptosis  Head: normocephalic; overall symmetric appearance without deformity  Face: overall symmetric without deformity; HB I/VI  Nose: No external deformity; see endoscopy  Lungs: symmetric chest rise; no wheezing  CV: Good distal perfusion; normal hear " rate  Extremities: No deformity  Neurologic Exam: Cranial nerves II-XII are grossly intact; no focal deficit     Procedure Note  Procedure performed: Rigid nasal endoscopy with left-sided debridement  Indication: To evaluate for sinonasal pathology not visualized on routine anterior rhinoscopy  Anesthesia: 4% topical lidocaine with 0.05 % oxymetazoline  Description of procedure: A 30 degree, 3 mm rigid endoscope was inserted into bilateral nasal cavities and the nasal valves, nasal cavity, middle meatus, sphenoethmoid recess, nasopharynx were evaluated for evidence of obstruction, edema, purulence, polyps and/or mass/lesion.     I then used a combination of non-cutting forceps, straight and curved suction to clear the left surgical cavities of packing, clot, crust, and fibrinopurulent debris.      Findings  No signs of infection; post surgical findings as expected    The patient tolerated the procedure well without complication.     Laboratory Review:  n/a     Imaging Review:  CT sinus 12/15/2022:  1.  The left nasal cavity is opacified along its mid to posterior   aspect with extension of soft tissue into the left posterior   nasopharynx. There is questionable remodeling of the posterolateral   wall of the left nasal cavity. Is an underlying lesion at this   location is possible and correlation with direct visualization is   advised.        MRI skull base w and w/o contrast 1/2/2023  IMPRESSION: 3.2 x 1.4 x 3.1 cm left posterior nasal cavity mass  compatible with pathology proven adenocarcinoma..     PET/CT 1/3/2023:  IMPRESSION: In this patient with biopsy-proven left nasal cavity  adenocarcinoma:  1. No evidence of metastasis in the chest, abdomen or pelvis.  2. Please refer to dedicated report for findings in the head and neck  region.     Impression:   1. 2.8 x 2.7 x 1.4 cm hypermetabolic mass in the left posterior nasal  cavity compatible with pathology proven adenocarcinoma.  2. No cervical lymphadenopathy.    3. Please refer to the whole body PET CT performed as a separate  report, for the findings of the remainder of the body.        *I have personally reviewed these images and agree with the radiologist's interpretation*        Pathology Review:  Final Diagnosis 1/17/23  A. NOSE, LEFT SINONASAL TUMOR, EXCISION:  - SINONASAL ADENOCARCINOMA, INTESTINAL TYPE  - See comment  B. NOSE, LEFT MIDDLE TURBINATE, EXCISION:  - Fragments of benign respiratory mucosa and lamellar bone  - No evidence of malignancy  C. NOSE, LEFT OLFACTORY CLEFT, EXCISION:  - SINONASAL ADENOCARCINOMA, INTESTINAL TYPE      Nasal mass biopsy - 12/23/2022  Final Diagnosis  NASAL MASS, BIOPSY:   1. Moderately-differentiated adenocarcinoma, favor     sinonasal adenocarcinoma, non-intestinal type         Assessment/Medical Decision Making:   left olfactory cleft ITAC  S/p stage 1 surgery  Nasal obstruction secondary to above  Atypical facial pain      Plan:  Patient presenting s/p adenocarcinoma resection on 1/17/23 at which extension to the olfactory cleft and a second stage surgery was elected as dural margins will be needed. Her upcoming surgery with transcribiform approach will be in conjunction with Dr. Tidwell of neurosurgery. The risks of smell loss, perioperative infection, ICU stay, and CSF leak were discussed with her. She is in agreement with the plan and will proceed with surgery on 2/2/23. Continue with saline rinses in meantime. She knows to reach out to me with any additional questions or concerns.    Germain Vyas MD    [facility]  Rhinology, Endoscopic Skull Base Surgery  [facility]  Department of Otolaryngology - Head & Neck Surgery    Scribe Disclosure:  I, Eduin Felix, am serving as a scribe to document services personally performed by Germain Vyas MD at this visit, based upon the provider's statements to me. All documentation has been reviewed by the aforementioned provider  prior to being entered into the official medical record.     Additional portions of the patient's history have been reviewed below.   ~~~~~~~~~~~~~~~~~~~~~~~~~~~~~~~~~~~~~~~~~~~~~~~~~~~~~~~~~~~~~~~~~~~~~~~~~~~~~~~~~~~~~~~~~~~~~~~~~~~~~~~~~~~~~~~~~~~~~~~~~~~~~~~~~~~~~~~    Past Medical History:   Diagnosis Date     Hypothyroidism      Motion sickness      PONV (postoperative nausea and vomiting)      Primary adenocarcinoma of nasal cavity (H) 12/23/2022        Past Surgical History:   Procedure Laterality Date     CATARACT IOL, RT/LT Left     as a child     HYSTERECTOMY  2015     LAPAROTOMY EXPLORATORY  2015     OPTICAL TRACKING SYSTEM ENDOSCOPIC EXCISION SINUS TUMOR Left 1/17/2023    Procedure: EXCISION, NEOPLASM, PARANASAL SINUS, ENDOSCOPIC, USING OPTICAL TRACKING SYSTEM;  Surgeon: Germain Vyas MD;  Location: [facility] OR     [place] BIOPSY NASAL LESION  12/23/2022     RETINAL DETACHMENT SURGERY Left 2019     TONSILLECTOMY      age 6        Family History   Problem Relation Age of Onset     Breast Cancer Mother      Breast Cancer Maternal Aunt      Breast Cancer Maternal Aunt      Lung Cancer Maternal Aunt      Bladder Cancer Maternal Aunt      Cancer Maternal Uncle         Eye        Social History     Socioeconomic History     Marital status:      Spouse name: None     Number of children: None     Years of education: None     Highest education level: None   Tobacco Use     Smoking status: Never     Smokeless tobacco: Never   Substance and Sexual Activity     Alcohol use: Yes     Comment: social use per patient     Drug use: Never

## 2023-01-25 NOTE — PATIENT INSTRUCTIONS
You were seen today with Dr. Abhi Tidwell. Your primary contact after today's visit is: Nan RN    Next steps:  Surgery planned for 2/2 with Dr. Vyas and Dr. Tidwell.      How to Contact Us  Send a TÃ¡ximot message to your provider.   Call the clinic - your call will be routed appropriately.   Neurosurgery Clinic: [phone]  ENT Clinic: [phone]   To speak directly to an RN Care Coordinator:  Nan RN: [phone]  Ila RN: [phone]    Note: We do our best to check voicemail frequently throughout the day and make every effort to return calls within 1-2 business days. For urgent matters, please use the general clinic phone numbers listed above.       Skull Base Surgery Instructions    PREPARING FOR SURGERY  You will need a preoperative assessment within 30 days of your surgery. We will set this up for you once we know your surgery date.  Before surgery, call the clinic:   If there is any change in your health, or you are having more frequent or severe symptoms   If you develop a cold or flu, or if you test positive for COVID-19   If you have any questions about what to expect before, during, or after surgery   If you need assistance filling out paperwork for short-term disability or FMLA, or you need a letter excusing you from work       MEDICATIONS BEFORE SURGERY  You will receive specific instructions about how to take your medications at your preoperative assessment visit. Talk to your care team about every medication that you take, including over-the-counter medications and supplements. Some medications can make you bleed too much during surgery, and some change how well anesthesia drugs work. Follow these general instructions for common medications, unless otherwise directed.     INSTRUCTIONS MEDICATION   Hold for 7 days before surgery  Supplements   Multivitamins   Aspirin (note: some medications can contain aspirin, like [place])     Hold for 7 days before surgery  Naproxen  (Aleve)      Hold for 7 days before surgery  Ibuprofen (Advil, Motrin)      Talk with the Preoperative Assessment Center (PAC) about how to take these medications before surgery    Insulin or oral medications for diabetes   Blood pressure medications   Anticoagulant medications, including:    warfarin (Coumadin)   enoxaparin (Lovenox)   dabigatran (Pradaxa)   apixaban (Eliquis)   rivaroxaban (Xarelto)   Antiplatelet medications, including:   clopidogrel bisulfate (Plavix)   cilostazol (Pletal)      Okay to keep taking for pain, as needed    Acetaminophen (Tylenol)        EATING AND DRINKING BEFORE SURGERY  Eat and drink as usual until 8 hours before your surgery arrival time. After that, no food or milk.   Drink clear liquids until 1 hour before your surgery arrival time. Clear liquids are liquids you can see through, like water, Gatorade, and Propel. You may also have black coffee and tea (no cream or milk).   Nothing by mouth within 1 hour of your surgery arrival time. This includes gum, candy, and breath mints.   If your care team tells you take medicine on the morning of surgery, it is okay to take medicine with a sip of water.   Do not drink alcohol for at least 24 hours before surgery. Do not use marijuana for 7 days prior to surgery.      PREVENTING INFECTION  Shower or bathe the night before and morning of your surgery following the instructions on your handout,  Showering Before Surgery.    Note: Only use the special antiseptic soap from your neck to your toes. Your care team will clean the skin at your surgery site.   Don t shave or clip hair near your surgery site. We ll remove the hair if needed.   Having diabetes and/or smoking can cause delayed wound healing and increase your risk of a surgical site infection. Quitting smoking and lowering your blood sugars can make a big difference. If you would like support with this, please let us know or work with your primary care provider.        SMOKING AND  "NICOTINE  Don t smoke or vape the morning of surgery. You may chew nicotine gum up to 2 hours before surgery. A nicotine patch is okay.   We strongly recommend quitting smoking and other tobacco products. Nicotine can cause delayed healing and wound complications after surgery.       PLANNING AHEAD - FINANCES  [email or web address]  [facility] Billing: Please call [phone], if you wish to pay a bill.  [facility] Financial Counselors: Please call [phone], if you have questions about possible costs and coverage or to discuss options if you don't have enough - or no - insurance for your care.  [facility] Cost of Care Estimates: Please visit the website to view our online estimate tool. If you have additional questions, call [phone] for estimates.  Our financial team will contact your insurance company as soon as surgery is scheduled. If your insurance company requires a prior authorization (pre-approval), our financial team will complete these necessary steps. Typically, we don t encounter many issues with insurance denying coverage for skull base surgery.    It is a good idea to call your insurance company and let them know you re having surgery. Ask questions about your deductible, co-insurance, and what of out-of-pocket costs you will be responsible for.   [employer] [facility] [employer]: You might hear about a company called [employer], with whom we contract to provide monitoring of your nerves during surgery (called \"intraoperative neuromonitoring\"). You may receive a letter from [employer] after your surgery, and this company and letter are legitimate. For questions and more information, please visit: [email or web address]      COVID-19 AND VISITOR RESTRICTIONS  Please visit [email or web address] for the latest information about hospital visitor " "restrictions.      HEALTHCARE DIRECTIVE  Consider preparing a Health Care Directive and choosing a Health Care Agent. A Health Care Directive is a written plan outlining your values and priorities for your future medical treatment. A Health Care Agent makes health care decisions based on your wishes if you are unable to communicate.   Download a document, information materials or register for a free class on advance care planning and creating a health care directive by visiting [email or web address], calling [phone], or emailing [email or web address].   If you have a health care directive or choose to complete a healthcare directive before surgery, please upload the document to [employer]. This will be attached to your chart. You may also want to bring a copy with you on the day of surgery.       YOUR SURGERY DAY  Parking:   Both self-park and  parking (24 hours, 7 days a week) are available at the [facility]. Self-park and  rates are the same. If the Patient Visitors Ramp is full or if a patient or visitor has limited mobility, please follow the signs to the  located at the main entrance. For more information, please visit: [email or web address]    Due to safety concerns around COVID-19, [facility] cannot offer  services to all patients at this time. However, we do still offer  services for patients with mobility limitations.   Imaging:  Your surgeon may want to do a CT scan on the day of surgery. This is most common with endoscopic endonasal surgery (through your nose). Sometimes, you will need to have small stickers or markers, called fiducials, placed on your head for the scan. This gives your surgeon a \"map\" of the surgical area, and helps your surgeon navigate around important structures during the surgery. If you will need fiducials, you will have small areas of hair shaved so the markers will stick to your head. These " markers are temporary, and will come off after your surgery.  What to bring:  Photo ID and insurance card   Copy of your healthcare directive (if you have one)   Glasses with case (you can t wear contacts during surgery)   Hearing aids with case   A few personal items (pack lightly)   Cell phone and    Inhaler (if you use)   Eye drops (if you use)   If you have a pacemaker, ICD (defibrillator) or other implant, please bring the ID card   If you have an implanted stimulator, please bring the remote control   If you have a legal guardian, bring a copy of the certified (court-stamped) guardianship papers   What to leave at home:  Please remove any jewelry, including body piercings   Leave jewelry and other valuables at home       HOSPITAL STAY  On average, your hospital stay will be between 2-3 days. It could be shorter or longer depending on your specific procedure, your health, and your recovery. The first 24 hours of your hospital stay will typically be in the Intensive Care Unit (ICU). You will be monitored closely and may have some of the following: Intravenous (IV) fluids and medications, a catheter which drains your urine, or a lumbar drain (a small catheter in your low back).  After the ICU, you will be moved to a regular hospital bed/floor. The rest of your post-operative recovery will focus on your individual needs which may include: helping with balance, nausea, swallowing, bowel management, pain, and incision care.       HOME RECOVERY  Everyone's recovery is different based on their health condition, age, and overall health status.   Plan to have an adult stay with you for at least 24 hours after you get home from the hospital.   Arrange for help at home. It is common to feel tired for the first few weeks (maybe months) and you will need to avoid some activities. Many people find that having someone help with household tasks, such as cleaning, cooking, and running errands is helpful.    Make your  home safe by removing tripping hazards and moving items you need to a place where you can easily reach them.        ACTIVITY RESTRICTIONS  Avoid strenuous exercise and activity for 6-12 weeks.   Do not lift anything greater than 10 pounds (about a gallon of milk).  Extra pressure in your head can disrupt the healing of the internal surgical area and cause complications. Avoid the following activities that may increase the pressure in your head:  Avoid bending over with your head below your heart  Avoid heavy lifting or straining  Avoid lifting with your arms above your shoulders  Take care to prevent constipation, as this can lead to straining  Avoid drinking through straws  Avoid blowing your nose  Try to cough and sneeze with your mouth open      SLEEPING  Plan to sleep with your head elevated (at least 30 degrees) for at least 2 weeks after surgery. You may find that sleeping in a recliner is helpful and more comfortable. This is especially important if you have sleep apnea and are unable to use your CPAP after surgery.  Please let your surgeon know if you have sleep apnea. You will not be able to use your CPAP for at least 2 weeks after surgery, sometimes longer. We will monitor your breathing closely while you are in the hospital.      INCISION CARE  Depending on your surgery, you might also have an incision on your upper leg / thigh or your abdomen. Sometimes, these incisions are covered with small strips of tape, called steri-strips. If you have steri-strips, allow these to peel off on their own.  If you had a lumbar drain during surgery, you will have a small, dissolvable suture in your low back.       PAIN MANAGEMENT  It is normal to have some pain after surgery. Most people do not experience severe pain. If you are experiencing severe pain at the incision site or severe headaches, please let us know right away.  You will usually be sent home with a small amount of narcotic pain medication. You should  gradually reduce the amount of pain medication that you take as your pain improves.  Avoid ibuprofen (Advil) or naproxen (Aleve) immediately after surgery, unless your surgeon tells you this is okay to take.   It is okay to take acetaminophen (Tylenol) for pain. You can take Tylenol with the narcotic pain medication, and some people find benefit in alternating between the narcotic pain medication and Tylenol. (Example: 1 tablet of oxycodone at 8:00 am, 1 tablet of Tylenol at 10:00 am).      DIET  A well-balanced diet is important for your recovery.   Try to eat plenty of fiber (fruits, whole grains, beans, and vegetables can be good sources of fiber) to help prevent constipation.   It is important to stay hydrated after surgery to prevent dehydration and help with bowel movements. Drink plenty of water throughout the day.      WORK AND FMLA / SHORT TERM DISABILITY  Most people take 6-12 weeks off work. This will depend on the type of work that you do and the surgery you are having.  We are happy to complete short term disability or FMLA paperwork for you. Please send a Datam message (you can attach a document) with the paperwork that you need completed. Please let us know where you'd like for us to send the paperwork. We can send it directly to your leave  or employer, with your permission, or we can send it back to you.  Please allow at least 5-7 business days for paperwork completion and processing.       CALL THE CLINIC IF YOU EXPERIENCE:  Drainage, swelling, fluid collection, or increased redness around the incision   Fever, or temperature of 101 degrees or higher   Stiff neck or extra sensitivity to light   Clear nasal drainage which you did not have before surgery, or a new salty/metallic taste  Increase in facial weakness   Vision changes  Pain not managed by your pain medication   Severe constipation or abdominal pain  Running low on prescription medication that was prescribed to you at the  time of surgery   Any other symptoms that you have questions about    After clinic hours or on the weekends, please call the hospital  at [phone] and ask to speak with the Neurosurgery or ENT resident on call. If you have a serious emergency, call 911 or come to the emergency room. If you can, come to the hospital where you had your surgery.     During clinic hours:   Department  Phone    Ear, Nose, & Throat (ENT)    [phone]    Neurosurgery    [phone]     Skull Base RN Care Coordinators:  We do our best to check voicemail frequently throughout the day. For urgent matters, please use the general clinic phone numbers listed above. Your call will be routed appropriately.     Nan Be MA, RN, PHN, [facility]   RN Care Coordinator & Skull Base    Direct: [phone]      Clara Guevara RN   Neurosurgery - Dr. Tidwell   Direct: [phone]      Syeda Jenkins, RN   ENT - Dr. Vyas, Dr. Lou   Direct: [phone]

## 2023-01-25 NOTE — Clinical Note
2023       RE: Rosalind Murphy  [address] [address] [address]     Dear Colleague,    Thank you for referring your patient, Rosalind Murphy, to the [facility] NEUROSURGERY [facility]INIC [place] at [facility]. Please see a copy of my visit note below.      [facility] Sk[facility]      Name: Rosalind Murphy  : 1970  Referring provider: Germain Vyas  2023      Reason for visit: Skull base intestinal type adenocarcinoma, new patient visit    Dear Dr. Crowley,     It was a pleasure to see Ms. Murphy in the [facility] Sk[facility] Pit[facility] today as a new patient.  As you recall, Ms. Murphy is a 52 year old right-handed female who has a history of intestinal type adenocarcinoma s/p excision on 23 by Dr. Vyas found to have tumor present at the olfactory cleft just adjacent to the cribriform plate at time of surgery.     The patient was evaluated initially on 22 by Dr. Crawley for several months of increasing left sided nasal breathing difficulty, facial pressure, and discolored and foul smelling drainage. Despite use of allergy medications, patient had not improvement, so an CT was done on 12/15/22, which showed an obstruction in the left nasal cavity. Patient then underwent biopsy on 22, which demonstrated moderately differentiated intestinal type adenocarcinoma. PET scan was negative and MRI was done, and she was then seen by Dr. Vyas on 23. Patient's case was discussed at tumor board with surgical excision potentially followed by adjuvant treatment was recommended. She underwent surgery on 23, at which time she was found to have tumor present in the olfactory cleft adjacent to the cribriform plate, necessitating dural resection and referral to here. Patient was referred to us to discuss surgery in combination with Dr. Vyas's team.    Today, she reports improved breathing post surgery. Patient  does not use a CPAP. She denies woodworking in the past.      Review of Systems:   Pertinent items are noted in HPI or as in patient entered ROS below, remainder of complete ROS is negative.     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 Presents with , and he has 4 kids. Their grand baby was born recently and is 7 weeks old now. Originally from [place].      Physical Exam:   General: No acute distress.   Head: No signs of trauma.    Eyes: Conjunctivae are normal.  Mouth/Throat: Oropharynx moist.  Neck: Normal range of motion.    Resp: No respiratory distress.   MSK: Moves all extremities.  No obvious deformity.  Neuro: The patient is fully oriented and quite pleasant. Speech normal. Left vision worse than right. Extraocular movements are intact without nystagmus. Facial sensation is intact in V1, V2, V3 distributions. Facial nerve function is normal.  Palate is symmetric. Shoulders are full strength. Tongue is midline. There is no pronator drift. Full strength in all extremities. Sensation intact throughout. No dysmetria with finger-nose-finger testing.   Psych: Normal mood and affect. Behavior is normal.      Imaging:  We reviewed her recent MRI dated 1/3/23.    MR Skull Base  IMPRESSION: 3.2 x 1.4 x 3.1 cm left posterior nasal cavity mass  compatible with pathology proven adenocarcinoma.    Laboratory:  Surgical Path (1/17/23)  Final Diagnosis  A. NOSE, LEFT SINONASAL TUMOR,  EXCISION:  - SINONASAL ADENOCARCINOMA, INTESTINAL TYPE  - See comment     B. NOSE, LEFT MIDDLE TURBINATE, EXCISION:  - Fragments of benign respiratory mucosa and lamellar bone  - No evidence of malignancy     C. NOSE, LEFT OLFACTORY CLEFT, EXCISION:  - SINONASAL ADENOCARCINOMA, INTESTINAL TYPE    Assessment:  1. ITAC s/p excision of the sinonasal portion 1/17/23   2. Plan for anterior skull base resection for ITAC at anterior fossa    Plan:  1. We reviewed the imaging findings and walked through the surgical process in detail.  In combination with Dr. Vyas's team, we will perform a transcribriform resection including dura and the left olfactory nerve with a goal to achieve negative margins. If additional resection is necessary including involvement on the right side, she was in agreement that the right olfactory nerve may need to be resected as well if it appeared involved by the tumor.   We reviewed reconstruction including the potential need for fascia elsy graft and/or lumbar drain placement or abdominal fat graft in addition to a nasoseptal flap.  2. We reviewed risks including bleeding, infection, CSF leak, anosmia/changes in taste, neurologic injury, stroke, incomplete resection or positive margins.  including risks, benefits, and perioperative expectations.  3. Patient agrees to proceed with surgery, which is planned for Feb 2.  4. Ms. Murphy and her  asked excellent questions which I answered to the best of my ability. I encouraged her to contact us should any questions or concerns arise in advance of surgery.    It has been a pleasure to participate in the care of your patient. Please feel free to contact us if we may be of any assistance for Ms. Murphy.      Abhi Tidwell MD   Department of Neurosurgery  [facility]  [facility]         45 minutes spent on the date of the encounter doing chart review, review of outside records, review of test results,  interpretation of tests, patient visit, documentation and discussion with other provider(s)      Scribe Disclosure:  I, Annita Spencerese, am serving as a scribe to document services personally performed by Abhi Tidwell MD at this visit, based upon the provider's statements to me. All documentation has been reviewed by the aforementioned provider prior to being entered into the official medical record.      Again, thank you for allowing me to participate in the care of your patient.      Sincerely,    Abhi Tidwell MD

## 2023-01-25 NOTE — PATIENT INSTRUCTIONS
You were seen in the ENT Clinic today by Dr. Vyas. If you have any questions or concerns after your appointment, please contact us (see below)      2.   Follow up after surgery.           How to Contact Us:  Send a Vook message to your provider. Our team will respond to you via Vook. Occasionally, we will need to call you to get further information.  For urgent matters (Monday-Friday), call the ENT Clinic: [phone] and speak with a call center team member - they will route your call appropriately.   If you'd like to speak directly with a nurse, please find our contact information below. We do our best to check voicemail frequently throughout the day, and will work to call you back within 1-2 days. For urgent matters, please use the general clinic phone numbers listed above.        Syeda VALADEZ RN  ENT RN Care Coordinator  Direct: [phone]  Karla PIMENTEL LPN  Direct: [phone]         [facility]  Department of Otolaryngology

## 2023-01-27 ENCOUNTER — TRANSFERRED RECORDS (OUTPATIENT)
Dept: HEALTH INFORMATION MANAGEMENT | Facility: CLINIC | Age: 53
End: 2023-01-27

## 2023-01-27 DIAGNOSIS — C80.1 ADENOCARCINOMA (H): Primary | ICD-10-CM

## 2023-02-01 ENCOUNTER — ANESTHESIA EVENT (OUTPATIENT)
Dept: SURGERY | Facility: CLINIC | Age: 53
End: 2023-02-01
Payer: COMMERCIAL

## 2023-02-02 ENCOUNTER — ANESTHESIA (OUTPATIENT)
Dept: SURGERY | Facility: CLINIC | Age: 53
End: 2023-02-02
Payer: COMMERCIAL

## 2023-02-02 ENCOUNTER — HOSPITAL ENCOUNTER (OUTPATIENT)
Dept: CT IMAGING | Facility: CLINIC | Age: 53
Discharge: HOME | End: 2023-02-02
Attending: OTOLARYNGOLOGY | Admitting: OTOLARYNGOLOGY
Payer: COMMERCIAL

## 2023-02-02 ENCOUNTER — APPOINTMENT (OUTPATIENT)
Dept: CT IMAGING | Facility: CLINIC | Age: 53
End: 2023-02-02
Attending: STUDENT IN AN ORGANIZED HEALTH CARE EDUCATION/TRAINING PROGRAM
Payer: COMMERCIAL

## 2023-02-02 ENCOUNTER — HOSPITAL ENCOUNTER (INPATIENT)
Facility: CLINIC | Age: 53
LOS: 3 days | Discharge: HOME | End: 2023-02-05
Attending: OTOLARYNGOLOGY | Admitting: NEUROLOGICAL SURGERY
Payer: COMMERCIAL

## 2023-02-02 DIAGNOSIS — C80.1 ADENOCARCINOMA (H): ICD-10-CM

## 2023-02-02 DIAGNOSIS — C31.9 SINONASAL UNDIFFERENTIATED CARCINOMA (H): Primary | ICD-10-CM

## 2023-02-02 LAB
ABO/RH(D): NORMAL
ANTIBODY SCREEN: NEGATIVE
GLUCOSE BLDC GLUCOMTR-MCNC: 107 MG/DL (ref 70–99)
GLUCOSE BLDC GLUCOMTR-MCNC: 162 MG/DL (ref 70–99)
HGB BLD-MCNC: 13.3 G/DL (ref 11.7–15.7)
SPECIMEN EXPIRATION DATE: NORMAL

## 2023-02-02 PROCEDURE — 09BM4ZZ EXCISION OF NASAL SEPTUM, PERCUTANEOUS ENDOSCOPIC APPROACH: ICD-10-PCS | Performed by: OTOLARYNGOLOGY

## 2023-02-02 PROCEDURE — 09BM4ZX EXCISION OF NASAL SEPTUM, PERCUTANEOUS ENDOSCOPIC APPROACH, DIAGNOSTIC: ICD-10-PCS | Performed by: OTOLARYNGOLOGY

## 2023-02-02 PROCEDURE — 250N000012 HC RX MED GY IP 250 OP 636 PS 637: Performed by: STUDENT IN AN ORGANIZED HEALTH CARE EDUCATION/TRAINING PROGRAM

## 2023-02-02 PROCEDURE — 200N000002 HC R&B ICU UMMC

## 2023-02-02 PROCEDURE — 250N000011 HC RX IP 250 OP 636: Performed by: STUDENT IN AN ORGANIZED HEALTH CARE EDUCATION/TRAINING PROGRAM

## 2023-02-02 PROCEDURE — 4A1GXSH MONITORING OF SKIN AND BREAST VASCULAR PERFUSION USING INDOCYANINE GREEN DYE, EXTERNAL APPROACH: ICD-10-PCS | Performed by: NEUROLOGICAL SURGERY

## 2023-02-02 PROCEDURE — 0NU047Z SUPPLEMENT SKULL WITH AUTOLOGOUS TISSUE SUBSTITUTE, PERCUTANEOUS ENDOSCOPIC APPROACH: ICD-10-PCS | Performed by: OTOLARYNGOLOGY

## 2023-02-02 PROCEDURE — 00BF4ZX EXCISION OF OLFACTORY NERVE, PERCUTANEOUS ENDOSCOPIC APPROACH, DIAGNOSTIC: ICD-10-PCS | Performed by: NEUROLOGICAL SURGERY

## 2023-02-02 PROCEDURE — 70486 CT MAXILLOFACIAL W/O DYE: CPT | Mod: 26 | Performed by: RADIOLOGY

## 2023-02-02 PROCEDURE — 8E09XBG COMPUTER ASSISTED PROCEDURE OF HEAD AND NECK REGION, WITH COMPUTERIZED TOMOGRAPHY: ICD-10-PCS | Performed by: NEUROLOGICAL SURGERY

## 2023-02-02 PROCEDURE — 360N000077 HC SURGERY LEVEL 4, PER MIN: Performed by: OTOLARYNGOLOGY

## 2023-02-02 PROCEDURE — 272N000002 HC OR SUPPLY OTHER OPNP: Performed by: OTOLARYNGOLOGY

## 2023-02-02 PROCEDURE — 20922 REMOVAL OF FASCIA FOR GRAFT: CPT | Mod: 62 | Performed by: OTOLARYNGOLOGY

## 2023-02-02 PROCEDURE — 250N000009 HC RX 250: Performed by: STUDENT IN AN ORGANIZED HEALTH CARE EDUCATION/TRAINING PROGRAM

## 2023-02-02 PROCEDURE — 88331 PATH CONSLTJ SURG 1 BLK 1SPC: CPT | Mod: TC | Performed by: OTOLARYNGOLOGY

## 2023-02-02 PROCEDURE — 710N000010 HC RECOVERY PHASE 1, LEVEL 2, PER MIN: Performed by: OTOLARYNGOLOGY

## 2023-02-02 PROCEDURE — 258N000003 HC RX IP 258 OP 636: Performed by: STUDENT IN AN ORGANIZED HEALTH CARE EDUCATION/TRAINING PROGRAM

## 2023-02-02 PROCEDURE — 36415 COLL VENOUS BLD VENIPUNCTURE: CPT | Performed by: ANESTHESIOLOGY

## 2023-02-02 PROCEDURE — 250N000011 HC RX IP 250 OP 636: Performed by: OTOLARYNGOLOGY

## 2023-02-02 PROCEDURE — 999N000141 HC STATISTIC PRE-PROCEDURE NURSING ASSESSMENT: Performed by: OTOLARYNGOLOGY

## 2023-02-02 PROCEDURE — 272N000004 HC RX 272: Performed by: OTOLARYNGOLOGY

## 2023-02-02 PROCEDURE — 09BV4ZX EXCISION OF LEFT ETHMOID SINUS, PERCUTANEOUS ENDOSCOPIC APPROACH, DIAGNOSTIC: ICD-10-PCS | Performed by: OTOLARYNGOLOGY

## 2023-02-02 PROCEDURE — 0JBM0ZZ EXCISION OF LEFT UPPER LEG SUBCUTANEOUS TISSUE AND FASCIA, OPEN APPROACH: ICD-10-PCS | Performed by: OTOLARYNGOLOGY

## 2023-02-02 PROCEDURE — 272N000001 HC OR GENERAL SUPPLY STERILE: Performed by: OTOLARYNGOLOGY

## 2023-02-02 PROCEDURE — 20922 REMOVAL OF FASCIA FOR GRAFT: CPT | Mod: 62 | Performed by: NEUROLOGICAL SURGERY

## 2023-02-02 PROCEDURE — 09BX4ZZ EXCISION OF LEFT SPHENOID SINUS, PERCUTANEOUS ENDOSCOPIC APPROACH: ICD-10-PCS | Performed by: OTOLARYNGOLOGY

## 2023-02-02 PROCEDURE — 31299 UNLISTED PX ACCESSORY SINUS: CPT | Mod: GC | Performed by: OTOLARYNGOLOGY

## 2023-02-02 PROCEDURE — 370N000017 HC ANESTHESIA TECHNICAL FEE, PER MIN: Performed by: OTOLARYNGOLOGY

## 2023-02-02 PROCEDURE — 250N000025 HC SEVOFLURANE, PER MIN: Performed by: OTOLARYNGOLOGY

## 2023-02-02 PROCEDURE — 64999 UNLISTED PX NERVOUS SYSTEM: CPT | Performed by: NEUROLOGICAL SURGERY

## 2023-02-02 PROCEDURE — 09BK4ZX EXCISION OF NASAL MUCOSA AND SOFT TISSUE, PERCUTANEOUS ENDOSCOPIC APPROACH, DIAGNOSTIC: ICD-10-PCS | Performed by: OTOLARYNGOLOGY

## 2023-02-02 PROCEDURE — 272N000480 CT FACIAL BONES WITHOUT CONTRAST

## 2023-02-02 PROCEDURE — 250N000013 HC RX MED GY IP 250 OP 250 PS 637: Performed by: STUDENT IN AN ORGANIZED HEALTH CARE EDUCATION/TRAINING PROGRAM

## 2023-02-02 PROCEDURE — 88305 TISSUE EXAM BY PATHOLOGIST: CPT | Mod: 26 | Performed by: PATHOLOGY

## 2023-02-02 PROCEDURE — C1763 CONN TISS, NON-HUMAN: HCPCS | Performed by: OTOLARYNGOLOGY

## 2023-02-02 PROCEDURE — 88331 PATH CONSLTJ SURG 1 BLK 1SPC: CPT | Mod: 26 | Performed by: PATHOLOGY

## 2023-02-02 PROCEDURE — 250N000011 HC RX IP 250 OP 636: Performed by: NURSE ANESTHETIST, CERTIFIED REGISTERED

## 2023-02-02 PROCEDURE — 250N000009 HC RX 250: Performed by: OTOLARYNGOLOGY

## 2023-02-02 PROCEDURE — 00BF4ZZ EXCISION OF OLFACTORY NERVE, PERCUTANEOUS ENDOSCOPIC APPROACH: ICD-10-PCS | Performed by: OTOLARYNGOLOGY

## 2023-02-02 PROCEDURE — 86901 BLOOD TYPING SEROLOGIC RH(D): CPT | Performed by: ANESTHESIOLOGY

## 2023-02-02 PROCEDURE — 85018 HEMOGLOBIN: CPT | Performed by: ANESTHESIOLOGY

## 2023-02-02 PROCEDURE — 00U247Z SUPPLEMENT DURA MATER WITH AUTOLOGOUS TISSUE SUBSTITUTE, PERCUTANEOUS ENDOSCOPIC APPROACH: ICD-10-PCS | Performed by: NEUROLOGICAL SURGERY

## 2023-02-02 PROCEDURE — 88311 DECALCIFY TISSUE: CPT | Mod: TC | Performed by: OTOLARYNGOLOGY

## 2023-02-02 PROCEDURE — 88305 TISSUE EXAM BY PATHOLOGIST: CPT | Mod: TC | Performed by: OTOLARYNGOLOGY

## 2023-02-02 PROCEDURE — 70450 CT HEAD/BRAIN W/O DYE: CPT | Mod: 26 | Performed by: RADIOLOGY

## 2023-02-02 PROCEDURE — 70450 CT HEAD/BRAIN W/O DYE: CPT

## 2023-02-02 PROCEDURE — 88311 DECALCIFY TISSUE: CPT | Mod: 26 | Performed by: PATHOLOGY

## 2023-02-02 DEVICE — GRAFT DURAGEN 2X2" ID220: Type: IMPLANTABLE DEVICE | Site: CRANIAL | Status: FUNCTIONAL

## 2023-02-02 RX ORDER — OXYMETAZOLINE HYDROCHLORIDE 0.05 G/100ML
SPRAY NASAL PRN
Status: DISCONTINUED | OUTPATIENT
Start: 2023-02-02 | End: 2023-02-02 | Stop reason: HOSPADM

## 2023-02-02 RX ORDER — NICARDIPINE HCL-0.9% SOD CHLOR 1 MG/10 ML
SYRINGE (ML) INTRAVENOUS PRN
Status: DISCONTINUED | OUTPATIENT
Start: 2023-02-02 | End: 2023-02-02

## 2023-02-02 RX ORDER — INDOCYANINE GREEN AND WATER 25 MG
KIT INJECTION PRN
Status: DISCONTINUED | OUTPATIENT
Start: 2023-02-02 | End: 2023-02-02

## 2023-02-02 RX ORDER — LIDOCAINE 40 MG/G
CREAM TOPICAL
Status: DISCONTINUED | OUTPATIENT
Start: 2023-02-02 | End: 2023-02-05 | Stop reason: HOSPADM

## 2023-02-02 RX ORDER — ACETAMINOPHEN 325 MG/1
975 TABLET ORAL EVERY 8 HOURS
Status: DISCONTINUED | OUTPATIENT
Start: 2023-02-03 | End: 2023-02-04

## 2023-02-02 RX ORDER — HYDROMORPHONE HYDROCHLORIDE 1 MG/ML
0.4 INJECTION, SOLUTION INTRAMUSCULAR; INTRAVENOUS; SUBCUTANEOUS EVERY 5 MIN PRN
Status: DISCONTINUED | OUTPATIENT
Start: 2023-02-02 | End: 2023-02-02 | Stop reason: HOSPADM

## 2023-02-02 RX ORDER — OXYCODONE HYDROCHLORIDE 5 MG/1
5 TABLET ORAL EVERY 4 HOURS PRN
Status: DISCONTINUED | OUTPATIENT
Start: 2023-02-02 | End: 2023-02-02 | Stop reason: HOSPADM

## 2023-02-02 RX ORDER — PROPOFOL 10 MG/ML
INJECTION, EMULSION INTRAVENOUS PRN
Status: DISCONTINUED | OUTPATIENT
Start: 2023-02-02 | End: 2023-02-02

## 2023-02-02 RX ORDER — DEXAMETHASONE SODIUM PHOSPHATE 4 MG/ML
INJECTION, SOLUTION INTRA-ARTICULAR; INTRALESIONAL; INTRAMUSCULAR; INTRAVENOUS; SOFT TISSUE PRN
Status: DISCONTINUED | OUTPATIENT
Start: 2023-02-02 | End: 2023-02-02

## 2023-02-02 RX ORDER — PROPRANOLOL HYDROCHLORIDE 20 MG/1
20 TABLET ORAL DAILY
Status: DISCONTINUED | OUTPATIENT
Start: 2023-02-03 | End: 2023-02-02

## 2023-02-02 RX ORDER — CEFAZOLIN SODIUM 1 G/3ML
1 INJECTION, POWDER, FOR SOLUTION INTRAMUSCULAR; INTRAVENOUS
Status: COMPLETED | OUTPATIENT
Start: 2023-02-02 | End: 2023-02-02

## 2023-02-02 RX ORDER — SODIUM CHLORIDE, SODIUM LACTATE, POTASSIUM CHLORIDE, CALCIUM CHLORIDE 600; 310; 30; 20 MG/100ML; MG/100ML; MG/100ML; MG/100ML
INJECTION, SOLUTION INTRAVENOUS CONTINUOUS
Status: DISCONTINUED | OUTPATIENT
Start: 2023-02-02 | End: 2023-02-02 | Stop reason: HOSPADM

## 2023-02-02 RX ORDER — ONDANSETRON 2 MG/ML
INJECTION INTRAMUSCULAR; INTRAVENOUS
Status: COMPLETED
Start: 2023-02-02 | End: 2023-02-03

## 2023-02-02 RX ORDER — PROCHLORPERAZINE MALEATE 10 MG
10 TABLET ORAL EVERY 6 HOURS PRN
Status: DISCONTINUED | OUTPATIENT
Start: 2023-02-02 | End: 2023-02-05 | Stop reason: HOSPADM

## 2023-02-02 RX ORDER — LABETALOL HYDROCHLORIDE 5 MG/ML
10-40 INJECTION, SOLUTION INTRAVENOUS EVERY 10 MIN PRN
Status: DISCONTINUED | OUTPATIENT
Start: 2023-02-02 | End: 2023-02-05

## 2023-02-02 RX ORDER — NALOXONE HYDROCHLORIDE 0.4 MG/ML
0.4 INJECTION, SOLUTION INTRAMUSCULAR; INTRAVENOUS; SUBCUTANEOUS
Status: DISCONTINUED | OUTPATIENT
Start: 2023-02-02 | End: 2023-02-05 | Stop reason: HOSPADM

## 2023-02-02 RX ORDER — EPINEPHRINE 1 MG/ML
INJECTION INTRAMUSCULAR; INTRAVENOUS; SUBCUTANEOUS PRN
Status: DISCONTINUED | OUTPATIENT
Start: 2023-02-02 | End: 2023-02-02 | Stop reason: HOSPADM

## 2023-02-02 RX ORDER — ACETAMINOPHEN 325 MG/1
975 TABLET ORAL ONCE
Status: COMPLETED | OUTPATIENT
Start: 2023-02-02 | End: 2023-02-02

## 2023-02-02 RX ORDER — ONDANSETRON 2 MG/ML
4 INJECTION INTRAMUSCULAR; INTRAVENOUS EVERY 30 MIN PRN
Status: DISCONTINUED | OUTPATIENT
Start: 2023-02-02 | End: 2023-02-02 | Stop reason: HOSPADM

## 2023-02-02 RX ORDER — FENTANYL CITRATE 50 UG/ML
50 INJECTION, SOLUTION INTRAMUSCULAR; INTRAVENOUS EVERY 5 MIN PRN
Status: DISCONTINUED | OUTPATIENT
Start: 2023-02-02 | End: 2023-02-02 | Stop reason: HOSPADM

## 2023-02-02 RX ORDER — NALOXONE HYDROCHLORIDE 0.4 MG/ML
0.2 INJECTION, SOLUTION INTRAMUSCULAR; INTRAVENOUS; SUBCUTANEOUS
Status: DISCONTINUED | OUTPATIENT
Start: 2023-02-02 | End: 2023-02-05 | Stop reason: HOSPADM

## 2023-02-02 RX ORDER — SODIUM CHLORIDE 9 MG/ML
INJECTION, SOLUTION INTRAVENOUS CONTINUOUS
Status: DISCONTINUED | OUTPATIENT
Start: 2023-02-03 | End: 2023-02-02

## 2023-02-02 RX ORDER — HYDROMORPHONE HCL IN WATER/PF 6 MG/30 ML
0.2 PATIENT CONTROLLED ANALGESIA SYRINGE INTRAVENOUS
Status: DISCONTINUED | OUTPATIENT
Start: 2023-02-02 | End: 2023-02-04

## 2023-02-02 RX ORDER — FENTANYL CITRATE 50 UG/ML
25 INJECTION, SOLUTION INTRAMUSCULAR; INTRAVENOUS EVERY 5 MIN PRN
Status: DISCONTINUED | OUTPATIENT
Start: 2023-02-02 | End: 2023-02-02 | Stop reason: HOSPADM

## 2023-02-02 RX ORDER — ONDANSETRON 2 MG/ML
INJECTION INTRAMUSCULAR; INTRAVENOUS PRN
Status: DISCONTINUED | OUTPATIENT
Start: 2023-02-02 | End: 2023-02-02

## 2023-02-02 RX ORDER — LEVOTHYROXINE SODIUM 75 UG/1
75 TABLET ORAL
Status: DISCONTINUED | OUTPATIENT
Start: 2023-02-03 | End: 2023-02-05 | Stop reason: HOSPADM

## 2023-02-02 RX ORDER — LIDOCAINE HYDROCHLORIDE 20 MG/ML
INJECTION, SOLUTION INFILTRATION; PERINEURAL PRN
Status: DISCONTINUED | OUTPATIENT
Start: 2023-02-02 | End: 2023-02-02

## 2023-02-02 RX ORDER — ESMOLOL HYDROCHLORIDE 10 MG/ML
INJECTION INTRAVENOUS PRN
Status: DISCONTINUED | OUTPATIENT
Start: 2023-02-02 | End: 2023-02-02

## 2023-02-02 RX ORDER — ONDANSETRON 4 MG/1
4 TABLET, ORALLY DISINTEGRATING ORAL EVERY 8 HOURS
Status: DISCONTINUED | OUTPATIENT
Start: 2023-02-02 | End: 2023-02-04

## 2023-02-02 RX ORDER — APREPITANT 40 MG/1
40 CAPSULE ORAL ONCE
Status: COMPLETED | OUTPATIENT
Start: 2023-02-02 | End: 2023-02-02

## 2023-02-02 RX ORDER — PROPRANOLOL HYDROCHLORIDE 20 MG/1
20 TABLET ORAL 2 TIMES DAILY
Status: DISCONTINUED | OUTPATIENT
Start: 2023-02-03 | End: 2023-02-05 | Stop reason: HOSPADM

## 2023-02-02 RX ORDER — ONDANSETRON 2 MG/ML
4 INJECTION INTRAMUSCULAR; INTRAVENOUS EVERY 8 HOURS
Status: DISCONTINUED | OUTPATIENT
Start: 2023-02-02 | End: 2023-02-04

## 2023-02-02 RX ORDER — FENTANYL CITRATE 50 UG/ML
INJECTION, SOLUTION INTRAMUSCULAR; INTRAVENOUS PRN
Status: DISCONTINUED | OUTPATIENT
Start: 2023-02-02 | End: 2023-02-02

## 2023-02-02 RX ORDER — HYDROMORPHONE HCL IN WATER/PF 6 MG/30 ML
0.4 PATIENT CONTROLLED ANALGESIA SYRINGE INTRAVENOUS
Status: DISCONTINUED | OUTPATIENT
Start: 2023-02-02 | End: 2023-02-04

## 2023-02-02 RX ORDER — ONDANSETRON 4 MG/1
4 TABLET, ORALLY DISINTEGRATING ORAL EVERY 8 HOURS
Status: DISCONTINUED | OUTPATIENT
Start: 2023-02-02 | End: 2023-02-02

## 2023-02-02 RX ORDER — OXYCODONE HYDROCHLORIDE 10 MG/1
10 TABLET ORAL EVERY 4 HOURS PRN
Status: DISCONTINUED | OUTPATIENT
Start: 2023-02-02 | End: 2023-02-02 | Stop reason: HOSPADM

## 2023-02-02 RX ORDER — SODIUM CHLORIDE 9 MG/ML
INJECTION, SOLUTION INTRAVENOUS CONTINUOUS
Status: ACTIVE | OUTPATIENT
Start: 2023-02-03 | End: 2023-02-03

## 2023-02-02 RX ORDER — ACETAMINOPHEN 325 MG/1
650 TABLET ORAL EVERY 4 HOURS PRN
Status: DISCONTINUED | OUTPATIENT
Start: 2023-02-05 | End: 2023-02-04

## 2023-02-02 RX ORDER — AMOXICILLIN 250 MG
1 CAPSULE ORAL 2 TIMES DAILY
Status: DISCONTINUED | OUTPATIENT
Start: 2023-02-03 | End: 2023-02-04

## 2023-02-02 RX ORDER — LIDOCAINE 40 MG/G
CREAM TOPICAL
Status: DISCONTINUED | OUTPATIENT
Start: 2023-02-02 | End: 2023-02-02 | Stop reason: HOSPADM

## 2023-02-02 RX ORDER — OXYCODONE HYDROCHLORIDE 10 MG/1
10 TABLET ORAL EVERY 4 HOURS PRN
Status: DISCONTINUED | OUTPATIENT
Start: 2023-02-02 | End: 2023-02-05 | Stop reason: HOSPADM

## 2023-02-02 RX ORDER — BISACODYL 10 MG
10 SUPPOSITORY, RECTAL RECTAL DAILY PRN
Status: DISCONTINUED | OUTPATIENT
Start: 2023-02-02 | End: 2023-02-05 | Stop reason: HOSPADM

## 2023-02-02 RX ORDER — HYDRALAZINE HYDROCHLORIDE 20 MG/ML
10-20 INJECTION INTRAMUSCULAR; INTRAVENOUS EVERY 30 MIN PRN
Status: DISCONTINUED | OUTPATIENT
Start: 2023-02-02 | End: 2023-02-05

## 2023-02-02 RX ORDER — POLYETHYLENE GLYCOL 3350 17 G/17G
17 POWDER, FOR SOLUTION ORAL DAILY
Status: DISCONTINUED | OUTPATIENT
Start: 2023-02-03 | End: 2023-02-05 | Stop reason: HOSPADM

## 2023-02-02 RX ORDER — OXYCODONE HYDROCHLORIDE 5 MG/1
5 TABLET ORAL EVERY 4 HOURS PRN
Status: DISCONTINUED | OUTPATIENT
Start: 2023-02-02 | End: 2023-02-05 | Stop reason: HOSPADM

## 2023-02-02 RX ORDER — ONDANSETRON 4 MG/1
4 TABLET, ORALLY DISINTEGRATING ORAL EVERY 30 MIN PRN
Status: DISCONTINUED | OUTPATIENT
Start: 2023-02-02 | End: 2023-02-02 | Stop reason: HOSPADM

## 2023-02-02 RX ORDER — HYDROMORPHONE HYDROCHLORIDE 1 MG/ML
0.2 INJECTION, SOLUTION INTRAMUSCULAR; INTRAVENOUS; SUBCUTANEOUS EVERY 5 MIN PRN
Status: DISCONTINUED | OUTPATIENT
Start: 2023-02-02 | End: 2023-02-02 | Stop reason: HOSPADM

## 2023-02-02 RX ORDER — ASCORBIC ACID 500 MG
500 TABLET ORAL DAILY
Status: DISCONTINUED | OUTPATIENT
Start: 2023-02-03 | End: 2023-02-05 | Stop reason: HOSPADM

## 2023-02-02 RX ORDER — LEVOTHYROXINE SODIUM 88 UG/1
88 TABLET ORAL
Status: DISCONTINUED | OUTPATIENT
Start: 2023-02-03 | End: 2023-02-02

## 2023-02-02 RX ORDER — SODIUM CHLORIDE, SODIUM LACTATE, POTASSIUM CHLORIDE, CALCIUM CHLORIDE 600; 310; 30; 20 MG/100ML; MG/100ML; MG/100ML; MG/100ML
INJECTION, SOLUTION INTRAVENOUS CONTINUOUS PRN
Status: DISCONTINUED | OUTPATIENT
Start: 2023-02-02 | End: 2023-02-02

## 2023-02-02 RX ADMIN — Medication 20 MG: at 16:36

## 2023-02-02 RX ADMIN — Medication 0.1 MG: at 18:27

## 2023-02-02 RX ADMIN — INDOCYANINE GREEN AND WATER 12 MG: KIT at 20:48

## 2023-02-02 RX ADMIN — SODIUM CHLORIDE, POTASSIUM CHLORIDE, SODIUM LACTATE AND CALCIUM CHLORIDE: 600; 310; 30; 20 INJECTION, SOLUTION INTRAVENOUS at 14:49

## 2023-02-02 RX ADMIN — PROPOFOL 20 MG: 10 INJECTION, EMULSION INTRAVENOUS at 15:16

## 2023-02-02 RX ADMIN — PHENYLEPHRINE HYDROCHLORIDE 100 MCG: 10 INJECTION INTRAVENOUS at 21:35

## 2023-02-02 RX ADMIN — PHENYLEPHRINE HYDROCHLORIDE 50 MCG: 10 INJECTION INTRAVENOUS at 15:23

## 2023-02-02 RX ADMIN — DEXMEDETOMIDINE HYDROCHLORIDE 4 MCG: 100 INJECTION, SOLUTION INTRAVENOUS at 20:09

## 2023-02-02 RX ADMIN — Medication 20 MG: at 20:29

## 2023-02-02 RX ADMIN — ONDANSETRON 4 MG: 2 INJECTION INTRAMUSCULAR; INTRAVENOUS at 21:01

## 2023-02-02 RX ADMIN — ESMOLOL HYDROCHLORIDE 20 MG: 10 INJECTION, SOLUTION INTRAVENOUS at 16:54

## 2023-02-02 RX ADMIN — ESMOLOL HYDROCHLORIDE 40 MG: 10 INJECTION, SOLUTION INTRAVENOUS at 15:16

## 2023-02-02 RX ADMIN — FENTANYL CITRATE 50 MCG: 50 INJECTION, SOLUTION INTRAMUSCULAR; INTRAVENOUS at 22:33

## 2023-02-02 RX ADMIN — Medication 20 MG: at 17:29

## 2023-02-02 RX ADMIN — FENTANYL CITRATE 50 MCG: 50 INJECTION, SOLUTION INTRAMUSCULAR; INTRAVENOUS at 17:34

## 2023-02-02 RX ADMIN — FENTANYL CITRATE 50 MCG: 50 INJECTION, SOLUTION INTRAMUSCULAR; INTRAVENOUS at 16:38

## 2023-02-02 RX ADMIN — CEFAZOLIN 2 G: 1 INJECTION, POWDER, FOR SOLUTION INTRAMUSCULAR; INTRAVENOUS at 15:29

## 2023-02-02 RX ADMIN — PROPOFOL 200 MG: 10 INJECTION, EMULSION INTRAVENOUS at 14:57

## 2023-02-02 RX ADMIN — ESMOLOL HYDROCHLORIDE 10 MG: 10 INJECTION, SOLUTION INTRAVENOUS at 16:40

## 2023-02-02 RX ADMIN — Medication 0.1 MG: at 17:48

## 2023-02-02 RX ADMIN — APREPITANT 40 MG: 40 CAPSULE ORAL at 11:48

## 2023-02-02 RX ADMIN — DEXMEDETOMIDINE HYDROCHLORIDE 8 MCG: 100 INJECTION, SOLUTION INTRAVENOUS at 18:27

## 2023-02-02 RX ADMIN — Medication 20 MG: at 15:56

## 2023-02-02 RX ADMIN — FENTANYL CITRATE 50 MCG: 50 INJECTION, SOLUTION INTRAMUSCULAR; INTRAVENOUS at 21:43

## 2023-02-02 RX ADMIN — CEFAZOLIN 2 G: 1 INJECTION, POWDER, FOR SOLUTION INTRAMUSCULAR; INTRAVENOUS at 19:29

## 2023-02-02 RX ADMIN — HYDROMORPHONE HYDROCHLORIDE 0.5 MG: 1 INJECTION, SOLUTION INTRAMUSCULAR; INTRAVENOUS; SUBCUTANEOUS at 16:55

## 2023-02-02 RX ADMIN — FENTANYL CITRATE 50 MCG: 50 INJECTION, SOLUTION INTRAMUSCULAR; INTRAVENOUS at 22:38

## 2023-02-02 RX ADMIN — FENTANYL CITRATE 50 MCG: 50 INJECTION, SOLUTION INTRAMUSCULAR; INTRAVENOUS at 16:26

## 2023-02-02 RX ADMIN — SUGAMMADEX 200 MG: 100 INJECTION, SOLUTION INTRAVENOUS at 21:43

## 2023-02-02 RX ADMIN — DEXMEDETOMIDINE HYDROCHLORIDE 8 MCG: 100 INJECTION, SOLUTION INTRAVENOUS at 17:54

## 2023-02-02 RX ADMIN — FENTANYL CITRATE 100 MCG: 50 INJECTION, SOLUTION INTRAMUSCULAR; INTRAVENOUS at 14:57

## 2023-02-02 RX ADMIN — Medication 20 MG: at 18:58

## 2023-02-02 RX ADMIN — LIDOCAINE HYDROCHLORIDE 100 MG: 20 INJECTION, SOLUTION INFILTRATION; PERINEURAL at 14:57

## 2023-02-02 RX ADMIN — DEXMEDETOMIDINE HYDROCHLORIDE 4 MCG: 100 INJECTION, SOLUTION INTRAVENOUS at 21:21

## 2023-02-02 RX ADMIN — HYDROMORPHONE HYDROCHLORIDE 0.4 MG: 0.2 INJECTION, SOLUTION INTRAMUSCULAR; INTRAVENOUS; SUBCUTANEOUS at 23:47

## 2023-02-02 RX ADMIN — HYDROMORPHONE HYDROCHLORIDE 0.5 MG: 1 INJECTION, SOLUTION INTRAMUSCULAR; INTRAVENOUS; SUBCUTANEOUS at 17:31

## 2023-02-02 RX ADMIN — ACETAMINOPHEN 975 MG: 325 TABLET ORAL at 11:48

## 2023-02-02 RX ADMIN — DEXAMETHASONE SODIUM PHOSPHATE 4 MG: 4 INJECTION, SOLUTION INTRA-ARTICULAR; INTRALESIONAL; INTRAMUSCULAR; INTRAVENOUS; SOFT TISSUE at 14:57

## 2023-02-02 RX ADMIN — Medication 0.1 MG: at 17:55

## 2023-02-02 RX ADMIN — PHENYLEPHRINE HYDROCHLORIDE 100 MCG: 10 INJECTION INTRAVENOUS at 18:51

## 2023-02-02 RX ADMIN — Medication 50 MG: at 14:57

## 2023-02-02 ASSESSMENT — ACTIVITIES OF DAILY LIVING (ADL)
ADLS_ACUITY_SCORE: 20
ADLS_ACUITY_SCORE: 18
ADLS_ACUITY_SCORE: 20

## 2023-02-02 ASSESSMENT — VISUAL ACUITY: OU: BASELINE

## 2023-02-02 NOTE — ANESTHESIA PREPROCEDURE EVALUATION
Anesthesia Pre-Procedure Evaluation    Patient: Rosalind Murphy   MRN: [id number] : 1970        Procedure : Procedure(s):  stealth assisted endoscopic endonasal anterior craniofacial resection, nasoseptal flap  fascia elsy graft, abdominal fat graft  INSERTION, DRAIN, SPINE, LUMBAR          Past Medical History:   Diagnosis Date     Hypothyroidism      Motion sickness      PONV (postoperative nausea and vomiting)      Primary adenocarcinoma of nasal cavity (H) 2022      Past Surgical History:   Procedure Laterality Date     CATARACT IOL, RT/LT Left     as a child     HYSTERECTOMY  2015     LAPAROTOMY EXPLORATORY       OPTICAL TRACKING SYSTEM ENDOSCOPIC EXCISION SINUS TUMOR Left 2023    Procedure: EXCISION, NEOPLASM, PARANASAL SINUS, ENDOSCOPIC, USING OPTICAL TRACKING SYSTEM;  Surgeon: Germain Vyas MD;  Location: [facility] OR     OK BIOPSY NASAL LESION  2022     RETINAL DETACHMENT SURGERY Left 2019     TONSILLECTOMY      age 6      Allergies   Allergen Reactions     Doxycycline Headache and Nausea     And Headaches       Hydroxychloroquine Headache and Tinnitus     Topiramate Tinnitus     Worsening Tinnitus        Social History     Tobacco Use     Smoking status: Never     Smokeless tobacco: Never   Substance Use Topics     Alcohol use: Yes     Comment: social use per patient      Wt Readings from Last 1 Encounters:   23 82.6 kg (182 lb)        Anesthesia Evaluation   Pt has had prior anesthetic.     History of anesthetic complications  - PONV.      ROS/MED HX  ENT/Pulmonary: Comment: Nasal tumor see ENT note      Neurologic:  - neg neurologic ROS     Cardiovascular:  - neg cardiovascular ROS     METS/Exercise Tolerance:     Hematologic:  - neg hematologic  ROS     Musculoskeletal:  - neg musculoskeletal ROS     GI/Hepatic:  - neg GI/hepatic ROS     Renal/Genitourinary:  - neg Renal ROS     Endo:     (+) thyroid problem, hypothyroidism,     Psychiatric/Substance Use:  - neg  psychiatric ROS     Infectious Disease:  - neg infectious disease ROS     Malignancy:   (+) Malignancy,     Other:            Physical Exam    Airway  airway exam normal           Respiratory Devices and Support         Dental           Cardiovascular             Pulmonary                   OUTSIDE LABS:  CBC: No results found for: WBC, HGB, HCT, PLT  BMP:   Lab Results   Component Value Date    CR 0.8 01/03/2023     COAGS: No results found for: PTT, INR, FIBR  POC: No results found for: BGM, HCG, HCGS  HEPATIC: No results found for: ALBUMIN, PROTTOTAL, ALT, AST, GGT, ALKPHOS, BILITOTAL, BILIDIRECT, FENG  OTHER: No results found for: PH, LACT, A1C, NERY, PHOS, MAG, LIPASE, AMYLASE, TSH, T4, T3, CRP, SED    Anesthesia Plan    ASA Status:  2   NPO Status:  NPO Appropriate    Anesthesia Type: General.     - Airway: ETT   Induction: Intravenous, Propofol.   Maintenance: Balanced.   Techniques and Equipment:     - Lines/Monitors: 2nd IV     - Blood: T&S     Consents    Anesthesia Plan(s) and associated risks, benefits, and realistic alternatives discussed. Questions answered and patient/representative(s) expressed understanding.    - Discussed:     - Discussed with:  Patient         Postoperative Care    Pain management: IV analgesics, Multi-modal analgesia.   PONV prophylaxis: Ondansetron (or other 5HT-3), Dexamethasone or Solumedrol, Aprepitant     Comments:                Kike Peterson MD

## 2023-02-02 NOTE — ANESTHESIA PROCEDURE NOTES
Airway       Patient location during procedure: OR       Procedure Start/Stop Times: 2/2/2023 2:59 PM  Staff -        Anesthesiologist:  Peter Portillo MD       Resident/Fellow: Kike Peterson MD       Performed By: resident  Consent for Airway        Urgency: elective  Indications and Patient Condition       Indications for airway management: ruben-procedural       Induction type:intravenous       Mask difficulty assessment: 0 - not attempted    Final Airway Details       Final airway type: endotracheal airway       Successful airway: ETT - single  Endotracheal Airway Details        ETT size (mm): 7.0       Cuffed: yes       Successful intubation technique: video laryngoscopy       VL Blade Size: Glidescope 4 (Anterior)       Grade View of Cords: 1       Adjucts: stylet       Position: Left       Measured from: lips       Secured at (cm): 21       Bite block used: None    Post intubation assessment        Placement verified by: capnometry, equal breath sounds and chest rise        Number of attempts at approach: 1       Secured with: pink tape       Ease of procedure: easy       Dentition: Intact    Medication(s) Administered   Medication Administration Time: 2/2/2023 2:59 PM

## 2023-02-03 ENCOUNTER — APPOINTMENT (OUTPATIENT)
Dept: OCCUPATIONAL THERAPY | Facility: CLINIC | Age: 53
End: 2023-02-03
Attending: OTOLARYNGOLOGY
Payer: COMMERCIAL

## 2023-02-03 LAB
ANION GAP SERPL CALCULATED.3IONS-SCNC: 10 MMOL/L (ref 7–15)
BASOPHILS # BLD AUTO: 0 10E3/UL (ref 0–0.2)
BASOPHILS NFR BLD AUTO: 0 %
BUN SERPL-MCNC: 8.7 MG/DL (ref 6–20)
CALCIUM SERPL-MCNC: 8.6 MG/DL (ref 8.6–10)
CHLORIDE SERPL-SCNC: 104 MMOL/L (ref 98–107)
CREAT SERPL-MCNC: 0.74 MG/DL (ref 0.51–0.95)
DEPRECATED HCO3 PLAS-SCNC: 26 MMOL/L (ref 22–29)
EOSINOPHIL # BLD AUTO: 0 10E3/UL (ref 0–0.7)
EOSINOPHIL NFR BLD AUTO: 0 %
ERYTHROCYTE [DISTWIDTH] IN BLOOD BY AUTOMATED COUNT: 11.9 % (ref 10–15)
GFR SERPL CREATININE-BSD FRML MDRD: >90 ML/MIN/1.73M2
GLUCOSE BLDC GLUCOMTR-MCNC: 117 MG/DL (ref 70–99)
GLUCOSE SERPL-MCNC: 118 MG/DL (ref 70–99)
HCT VFR BLD AUTO: 37.3 % (ref 35–47)
HGB BLD-MCNC: 12.2 G/DL (ref 11.7–15.7)
IMM GRANULOCYTES # BLD: 0.1 10E3/UL
IMM GRANULOCYTES NFR BLD: 1 %
LYMPHOCYTES # BLD AUTO: 1.3 10E3/UL (ref 0.8–5.3)
LYMPHOCYTES NFR BLD AUTO: 12 %
MAGNESIUM SERPL-MCNC: 2 MG/DL (ref 1.7–2.3)
MCH RBC QN AUTO: 30.6 PG (ref 26.5–33)
MCHC RBC AUTO-ENTMCNC: 32.7 G/DL (ref 31.5–36.5)
MCV RBC AUTO: 94 FL (ref 78–100)
MONOCYTES # BLD AUTO: 0.7 10E3/UL (ref 0–1.3)
MONOCYTES NFR BLD AUTO: 7 %
NEUTROPHILS # BLD AUTO: 8.2 10E3/UL (ref 1.6–8.3)
NEUTROPHILS NFR BLD AUTO: 80 %
NRBC # BLD AUTO: 0 10E3/UL
NRBC BLD AUTO-RTO: 0 /100
PHOSPHATE SERPL-MCNC: 3.4 MG/DL (ref 2.5–4.5)
PLATELET # BLD AUTO: 205 10E3/UL (ref 150–450)
POTASSIUM SERPL-SCNC: 4.1 MMOL/L (ref 3.4–5.3)
RBC # BLD AUTO: 3.99 10E6/UL (ref 3.8–5.2)
SARS-COV-2 RNA RESP QL NAA+PROBE: NEGATIVE
SODIUM SERPL-SCNC: 140 MMOL/L (ref 136–145)
WBC # BLD AUTO: 10.2 10E3/UL (ref 4–11)

## 2023-02-03 PROCEDURE — 258N000003 HC RX IP 258 OP 636

## 2023-02-03 PROCEDURE — 250N000011 HC RX IP 250 OP 636

## 2023-02-03 PROCEDURE — 85025 COMPLETE CBC W/AUTO DIFF WBC: CPT | Performed by: STUDENT IN AN ORGANIZED HEALTH CARE EDUCATION/TRAINING PROGRAM

## 2023-02-03 PROCEDURE — 120N000002 HC R&B MED SURG/OB UMMC

## 2023-02-03 PROCEDURE — 97166 OT EVAL MOD COMPLEX 45 MIN: CPT | Mod: GO

## 2023-02-03 PROCEDURE — 80048 BASIC METABOLIC PNL TOTAL CA: CPT | Performed by: STUDENT IN AN ORGANIZED HEALTH CARE EDUCATION/TRAINING PROGRAM

## 2023-02-03 PROCEDURE — 250N000013 HC RX MED GY IP 250 OP 250 PS 637: Performed by: STUDENT IN AN ORGANIZED HEALTH CARE EDUCATION/TRAINING PROGRAM

## 2023-02-03 PROCEDURE — 83735 ASSAY OF MAGNESIUM: CPT

## 2023-02-03 PROCEDURE — U0005 INFEC AGEN DETEC AMPLI PROBE: HCPCS | Performed by: PHYSICIAN ASSISTANT

## 2023-02-03 PROCEDURE — 250N000013 HC RX MED GY IP 250 OP 250 PS 637: Performed by: PHYSICIAN ASSISTANT

## 2023-02-03 PROCEDURE — 97535 SELF CARE MNGMENT TRAINING: CPT | Mod: GO

## 2023-02-03 PROCEDURE — 36415 COLL VENOUS BLD VENIPUNCTURE: CPT | Performed by: STUDENT IN AN ORGANIZED HEALTH CARE EDUCATION/TRAINING PROGRAM

## 2023-02-03 PROCEDURE — 250N000011 HC RX IP 250 OP 636: Performed by: STUDENT IN AN ORGANIZED HEALTH CARE EDUCATION/TRAINING PROGRAM

## 2023-02-03 PROCEDURE — 84100 ASSAY OF PHOSPHORUS: CPT

## 2023-02-03 PROCEDURE — 250N000013 HC RX MED GY IP 250 OP 250 PS 637

## 2023-02-03 PROCEDURE — 97530 THERAPEUTIC ACTIVITIES: CPT | Mod: GO

## 2023-02-03 PROCEDURE — 250N000011 HC RX IP 250 OP 636: Performed by: PHYSICIAN ASSISTANT

## 2023-02-03 RX ORDER — ACETAMINOPHEN 325 MG/1
325-650 TABLET ORAL EVERY 6 HOURS PRN
COMMUNITY
End: 2024-08-14

## 2023-02-03 RX ORDER — LEVOTHYROXINE SODIUM 75 UG/1
75 TABLET ORAL DAILY
COMMUNITY

## 2023-02-03 RX ADMIN — ONDANSETRON 4 MG: 2 INJECTION INTRAMUSCULAR; INTRAVENOUS at 00:00

## 2023-02-03 RX ADMIN — ONDANSETRON 4 MG: 4 TABLET, ORALLY DISINTEGRATING ORAL at 15:52

## 2023-02-03 RX ADMIN — HYDROMORPHONE HYDROCHLORIDE 0.2 MG: 0.2 INJECTION, SOLUTION INTRAMUSCULAR; INTRAVENOUS; SUBCUTANEOUS at 05:37

## 2023-02-03 RX ADMIN — PROCHLORPERAZINE EDISYLATE 10 MG: 5 INJECTION INTRAMUSCULAR; INTRAVENOUS at 00:38

## 2023-02-03 RX ADMIN — SENNOSIDES AND DOCUSATE SODIUM 1 TABLET: 50; 8.6 TABLET ORAL at 08:02

## 2023-02-03 RX ADMIN — ACETAMINOPHEN 975 MG: 325 TABLET ORAL at 20:19

## 2023-02-03 RX ADMIN — POLYETHYLENE GLYCOL 3350 17 G: 17 POWDER, FOR SOLUTION ORAL at 08:02

## 2023-02-03 RX ADMIN — HYDROMORPHONE HYDROCHLORIDE 0.2 MG: 0.2 INJECTION, SOLUTION INTRAMUSCULAR; INTRAVENOUS; SUBCUTANEOUS at 07:36

## 2023-02-03 RX ADMIN — PROCHLORPERAZINE EDISYLATE 10 MG: 5 INJECTION INTRAMUSCULAR; INTRAVENOUS at 17:39

## 2023-02-03 RX ADMIN — PROPRANOLOL HYDROCHLORIDE 20 MG: 20 TABLET ORAL at 20:16

## 2023-02-03 RX ADMIN — SODIUM CHLORIDE: 9 INJECTION, SOLUTION INTRAVENOUS at 00:24

## 2023-02-03 RX ADMIN — LEVOTHYROXINE SODIUM 75 MCG: 0.05 TABLET ORAL at 08:02

## 2023-02-03 RX ADMIN — SALINE NASAL SPRAY 2 SPRAY: 1.5 SOLUTION NASAL at 14:06

## 2023-02-03 RX ADMIN — ACETAMINOPHEN 975 MG: 325 TABLET ORAL at 08:01

## 2023-02-03 RX ADMIN — PROPRANOLOL HYDROCHLORIDE 20 MG: 20 TABLET ORAL at 08:02

## 2023-02-03 RX ADMIN — HYDROMORPHONE HYDROCHLORIDE 0.2 MG: 0.2 INJECTION, SOLUTION INTRAMUSCULAR; INTRAVENOUS; SUBCUTANEOUS at 14:10

## 2023-02-03 RX ADMIN — SALINE NASAL SPRAY 2 SPRAY: 1.5 SOLUTION NASAL at 20:15

## 2023-02-03 RX ADMIN — SALINE NASAL SPRAY 2 SPRAY: 1.5 SOLUTION NASAL at 08:04

## 2023-02-03 RX ADMIN — ONDANSETRON 4 MG: 2 INJECTION INTRAMUSCULAR; INTRAVENOUS at 07:35

## 2023-02-03 RX ADMIN — ONDANSETRON 4 MG: 4 TABLET, ORALLY DISINTEGRATING ORAL at 23:39

## 2023-02-03 ASSESSMENT — VISUAL ACUITY
OU: BASELINE

## 2023-02-03 ASSESSMENT — ACTIVITIES OF DAILY LIVING (ADL)
ADLS_ACUITY_SCORE: 21
ADLS_ACUITY_SCORE: 20
ADLS_ACUITY_SCORE: 23
PREVIOUS_RESPONSIBILITIES: WORK;DRIVING;FINANCES;MEDICATION MANAGEMENT;SHOPPING;LAUNDRY;HOUSEKEEPING;MEAL PREP
ADLS_ACUITY_SCORE: 21
ADLS_ACUITY_SCORE: 20
ADLS_ACUITY_SCORE: 23
ADLS_ACUITY_SCORE: 21
ADLS_ACUITY_SCORE: 20
ADLS_ACUITY_SCORE: 23
ADLS_ACUITY_SCORE: 21
ADLS_ACUITY_SCORE: 21
ADLS_ACUITY_SCORE: 20

## 2023-02-03 NOTE — PLAN OF CARE
Major Shift Events:  Arrived from PACU to 4A this shift. Neuro intact. Moves all extremities, generalized weakness. L pupil slightly irregular, off center in iris. This is baseline per patient d/t cataract surgery. PERRLA intact. Sinus rhythm. BP stable. Afebrile, room air. Ivory in place, adequate output. No BM this shift. Nausea and vomiting, zofran and compazine given to treat. Clear liquids. Pain managed with PRN's.    Plan: Continue to manage pain and nausea/vomiting. Advance diet as tolerated. Remove ivory. Continue to monitor and treat per plan of care.   For vital signs and complete assessments, please see documentation flowsheets.

## 2023-02-03 NOTE — PROGRESS NOTES
"   02/03/23 0918   Appointment Info   Signing Clinician's Name / Credentials (OT) Caprice Gee OTR/L   Rehab Comments (OT) skullbase precs   Living Environment   People in Home spouse   Current Living Arrangements house   Home Accessibility stairs to enter home;stairs within home   Number of Stairs, Main Entrance 2   Stair Railings, Main Entrance none   Number of Stairs, Within Home, Primary greater than 10 stairs  (1 flight to upper level)   Stair Railings, Within Home, Primary railings safe and in good condition   Transportation Anticipated car, drives self;family or friend will provide   Living Environment Comments Pt lives with spouse in a house, 2 ARIANA w/o hand rail. 1 flight of stairs to upper level where bedroom is located. Pt has a walk in shower, no shower chair or grab bars.   Self-Care   Usual Activity Tolerance good   Current Activity Tolerance moderate   Equipment Currently Used at Home none   Fall history within last six months yes   Number of times patient has fallen within last six months 2   Activity/Exercise/Self-Care Comment Pt reports being IND w/ ADLs/mobility at baseline. Reports 2 falls in last 6 mo, related to \"not paying attention\". Pt works in an eye clinic, off for ~6 wks   Instrumental Activities of Daily Living (IADL)   Previous Responsibilities work;driving;finances;medication management;shopping;laundry;housekeeping;meal prep   IADL Comments Pt reports spouse able to assist PRN w/ heavy IADL   General Information   Onset of Illness/Injury or Date of Surgery 02/02/23   Referring Physician Jose Fine MD   Patient/Family Therapy Goal Statement (OT) Return home   Additional Occupational Profile Info/Pertinent History of Current Problem Rosalind Murphy is a 52 year old female with a past medical history of sinonasal adenocarcinoma, intestinal type, now s/p anterior craniofacial resection with resection of dural margins and repair with right nasoseptal flap and fascia elsy 2/2/23. "   Existing Precautions/Restrictions other (see comments);fall  (skull base prec)   Left Upper Extremity (Weight-bearing Status) partial weight-bearing (PWB)  (10#)   Right Upper Extremity (Weight-bearing Status) partial weight-bearing (PWB)  (10#)   General Observations and Info Activity: skull base prec, up w/ A   Cognitive Status Examination   Orientation Status orientation to person, place and time   Cognitive Status Comments Drowsy, cognition appears WFL, will continue to monitor   Visual Perception   Visual Impairment/Limitations corrective lenses full-time   Impact of Vision Impairment on Function (Vision) Glasses present, pt denies acute visual changes   Sensory   Sensory Quick Adds sensation intact   Pain Assessment   Patient Currently in Pain Yes, see Vital Sign flowsheet   Posture   Posture not impaired   Range of Motion Comprehensive   General Range of Motion bilateral upper extremity ROM WFL   Strength Comprehensive (MMT)   General Manual Muscle Testing (MMT) Assessment no strength deficits identified   Comment, General Manual Muscle Testing (MMT) Assessment MMT deferred d/t precautions, appears at least 3+/5 mmt   Coordination   Upper Extremity Coordination No deficits were identified   Bed Mobility   Bed Mobility supine-sit;sit-supine   Supine-Sit [place] (Bed Mobility) minimum assist (75% patient effort)   Sit-Supine [place] (Bed Mobility) supervision   Transfers   Transfers sit-stand transfer   Sit-Stand Transfer   Sit-Stand [place] (Transfers) contact guard   Activities of Daily Living   BADL Assessment/Intervention bathing;lower body dressing;grooming;toileting   Bathing Assessment/Intervention   [place] Level (Bathing) minimum assist (75% patient effort)   Comment, (Bathing) Per clinical judgment   Lower Body Dressing Assessment/Training   [place] Level (Lower Body Dressing) set up   Grooming Assessment/Training   [place] Level (Grooming) set up   Comment, (Grooming)  Per clinical judgment   Toileting   Comment, (Toileting) Per clinical judgment   [place] Level (Toileting) contact guard assist   Clinical Impression   Criteria for Skilled Therapeutic Interventions Met (OT) Yes, treatment indicated   OT Diagnosis Decreased IND w/ ADLs/IADLs   OT Problem List-Impairments impacting ADL problems related to;activity tolerance impaired;pain;strength;post-surgical precautions   Assessment of Occupational Performance 3-5 Performance Deficits   Identified Performance Deficits Dressing, bathing, toileting, g/h, home mgmt   Planned Therapy Interventions (OT) ADL retraining;IADL retraining;strengthening;transfer training;home program guidelines;progressive activity/exercise;risk factor education   Clinical Decision Making Complexity (OT) moderate complexity   Anticipated Equipment Needs Upon Discharge (OT) other (see comments)  (TBD)   Risk & Benefits of therapy have been explained evaluation/treatment results reviewed;care plan/treatment goals reviewed;risks/benefits reviewed;current/potential barriers reviewed;participants voiced agreement with care plan;participants included;patient;spouse/significant other   Clinical Impression Comments Pt will benefit from skilled OT services to progress IND w/ ADLs/IADLs and support return to PLOF   OT Total Evaluation Time   OT Eval, Moderate Complexity Minutes (85930) 10   OT Goals   Therapy Frequency (OT) Daily   OT Predicted Duration/Target Date for Goal Attainment 02/10/23   OT Goals Hygiene/Grooming;Lower Body Dressing;Lower Body Bathing;Toilet Transfer/Toileting;Home Management;OT Goal 1   OT: Hygiene/Grooming modified independent   OT: Lower Body Dressing Modified independent;within precautions   OT: Lower Body Bathing Modified independent;with precautions   OT: Toilet Transfer/Toileting Supervision/stand-by assist;toilet transfer;cleaning and garment management;using adaptive equipment   OT: Home Management Supervision/stand-by assist;with  light demand household tasks;within precautions;ambulatory level   OT: Goal 1 Pt will teach back precautions w/ 100% accuracy prior to discharge to ensure adherence for safe completion of ADLs   Interventions   Interventions Quick Adds Self-Care/Home Management;Therapeutic Activity   Self-Care/Home Management   Self-Care/Home Mgmt/ADL, Compensatory, Meal Prep Minutes (06882) 10   Therapeutic Activities   Therapeutic Activity Minutes (13439) 11   OT Discharge Planning   OT Plan Screen for PT, toilet tx, act emily, functional reaching   OT Discharge Recommendation (DC Rec) home with assist   OT Rationale for DC Rec Pt presents below baseline, limited by post-op pain and mild deconditioning. Pt reports good assist from spouse at home, able to assist PRN w/ heavy ADL/IADL. Anticipate pt will be safe to d/c home when medically ready.   OT Brief overview of current status Close SBA   Total Session Time   Timed Code Treatment Minutes 21   Total Session Time (sum of timed and untimed services) 31

## 2023-02-03 NOTE — PLAN OF CARE
Arrived from: 4A at 1400   Belongings/meds: phone, , clothes, bag, toiletries, glasses, shoes  2 RN Skin Assessment Completed by: Carlene GASPAR RN and Christina THORNE RN    Non-intact findings documented (yes/no/NA): Yes- left thigh flap covered with primapore CDI, R hand bruise, scars to bilateral knees. L nostril with serosang drain. Red spots to bilateral cheeks from tape per pt, oncoming RN will ask MD for WOC consult if necessary.

## 2023-02-03 NOTE — PROGRESS NOTES
"Otolaryngology Progress Note  February 3, 2023    S: No acute events overnight. Had some emesis immediately after surgery, but improved with Zofran and compazine and doing well. Has stable headache. Passing gas. No anterior nasal drainage or salty/metallic taste.    O: /72 (BP Location: Right arm, Cuff Size: Adult Regular)   Pulse 66   Temp 97.9  F (36.6  C) (Oral)   Resp 17   Ht 1.727 m (5' 8\")   Wt 85.3 kg (188 lb 0.8 oz)   SpO2 98%   BMI 28.59 kg/m     General: Alert and oriented x 3, No acute distress   HEENT: EOMI. HB 1/6. Anterior nares dry with old blood, posterior oropharyngeal wall dry.   Pulmonary: Breathing non-labored, no stridor, no accessory muscle use.   Extremities: Left thigh donor site flat      Intake/Output Summary (Last 24 hours) at 2/3/2023 0827  Last data filed at 2/3/2023 0800  Gross per 24 hour   Intake 3708 ml   Output 1835 ml   Net 1873 ml     LABS:  ROUTINE IP LABS (Last four results)  BMP  Recent Labs   Lab 02/03/23  0738 02/03/23  0650 02/02/23  2339 02/02/23  1119     --   --   --    POTASSIUM 4.1  --   --   --    CHLORIDE 104  --   --   --    CO2 26  --   --   --    BUN 8.7  --   --   --    CR 0.74  --   --   --    * 117* 162* 107*     Recent Labs   Lab 02/03/23  0738   NERY 8.6   MAG 2.0   PHOS 3.4       CBC  Recent Labs   Lab 02/03/23  0738 02/02/23  1423   WBC 10.2  --    HGB 12.2 13.3   HCT 37.3  --      --      No results for input(s): CRP, SED in the last 54599 hours.    INRNo lab results found in last 7 days.    Cultures:  No results for input(s): CULT in the last 168 hours.    A/P: Rosalind Murphy is a 52 year old female with a past medical history of sinonasal adenocarcinoma, intestinal type, now s/p anterior craniofacial resection with resection of dural margins and repair with right nasoseptal flap and fascia elsy 2/2/23.    Neuro:  - Pain control: Tylenol, oxycodone PRN    HEENT:  - Monitor for signs and symptoms of CSF leak  - Skullbase " precautions: no positive pressure ventilation or incentive spirometry, no blind instrumentation of the nose, sneeze with mouth open, avoid straining, coughing, and vomiting. Okay for nasal cannula from ENT standpoint.  - Scheduled Zofran x24 hours (patient may refuse)  - Nasal saline to begin POD1  - Okay for Afrin  - Remainder of cares per primary      Respiratory:  - supplemental O2 PRN to keep sats >92%    CV/heme:  - hemodynamically stable  - PTA propanolol    FEN/GI:  - ADAT, ok for regular diet  - Last bowel movement PTA, bowel regimen ordered  - Zofran scheduled x24 hours then PRN    :  - voiding independently    Endo  - PTA synthroid  - ISS to maintain BG < 180    Heme/ID:  - YUSRA, perioperative antibiotics complete    PPX:  - SCDs  - No IS    Consults: Neurosurgery    Dispo: Transfer to floor, ENT primary once floor status    -- Patient and plan discussed with staff Dr. Akash Vyas.    Diane Brewer MD PGY4  ENT Resident

## 2023-02-03 NOTE — BRIEF OP NOTE
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]  Post-operative diagnosis Same as pre-operative diagnosis    Procedure: Procedure(s):  stealth assisted endoscopic endonasal anterior craniofacial resection, nasoseptal flap  fascia elsy graft, abdominal fat graft  Surgeon: Surgeon(s) and Role:  Panel 1:     * Germain Vyas MD - Primary     * Diane Brewer MD - Resident - Assisting  Panel 2:     * Abhi Tidwell MD - Primary     * Oliver Michael MD - Resident - Assisting  Anesthesia: General   Estimated Blood Loss: 25ml    Drains: None  Specimens:   ID Type Source Tests Collected by Time Destination   1 : Left posterior ethmoid Tissue Ethmoid Tissue SURGICAL PATHOLOGY EXAM Germain Vyas MD 2/2/2023  4:24 PM    2 : Left anterior ethmoid Tissue Ethmoid Tissue SURGICAL PATHOLOGY EXAM Germain Vyas MD 2/2/2023  4:32 PM    3 : left lateral nasal wall Tissue Other SURGICAL PATHOLOGY EXAM Germain Vyas MD 2/2/2023  4:34 PM    4 : Right olfactory cleft Tissue Other SURGICAL PATHOLOGY EXAM Germain Vyas MD 2/2/2023  4:52 PM    5 : Left anterior septal margin Tissue Other SURGICAL PATHOLOGY EXAM Germain Vyas MD 2/2/2023  5:28 PM    6 : Left inferior septal margin Tissue Other SURGICAL PATHOLOGY EXAM Germain Vyas MD 2/2/2023  5:29 PM    7 : Left sphenoid rostrum Tissue Other SURGICAL PATHOLOGY EXAM Germain Vyas MD 2/2/2023  5:31 PM    8 : Left olfactory cleft Tissue Other SURGICAL PATHOLOGY EXAM Germain Vyas MD 2/2/2023  5:38 PM    9 : Left olfactory cleft #2 Tissue Other SURGICAL PATHOLOGY EXAM Germain Vyas MD 2/2/2023  5:50 PM    10 : Left olfactory #3 Tissue Other SURGICAL PATHOLOGY EXAM Germain Vyas MD 2/2/2023  7:11 PM    11 : Posterior Dural Margin Tissue Other SURGICAL PATHOLOGY EXAM Germain Vyas MD 2/2/2023  7:47 PM    12 :  "Anterior Dural Margin Tissue Other SURGICAL PATHOLOGY EXAM Germain Vyas MD 2/2/2023  7:49 PM    13 : Medial Dural Margin Tissue Other SURGICAL PATHOLOGY EXAM Germain Vyas MD 2/2/2023  7:49 PM    14 : Lateral Dural Margin Tissue Other SURGICAL PATHOLOGY EXAM Germain Vyas MD 2/2/2023  7:51 PM    15 : Posterior Nerve Olfactory Margin Tissue Other SURGICAL PATHOLOGY EXAM Germain Vyas MD 2/2/2023  7:52 PM      Findings: Left sinonasal carcinoma resected along with olfactory bulb. Dural edges were taken for frozen.   Complications: None.  Implants:   Implant Name Type Inv. Item Serial No.  Lot No. LRB No. Used Action   GRAFT DURAGEN 2X2\"  - [id number]  GRAFT DURAGEN 2X2\"   [employer] [id number] N/A 1 Implanted           "

## 2023-02-03 NOTE — PHARMACY-ADMISSION MEDICATION HISTORY
Admission Medication History Completed by Pharmacy    See [facility] Admission Navigator for allergy information, preferred outpatient pharmacy, prior to admission medications and immunization status.     Medication History Sources:     Patient interview and dispense report    Changes made to PTA medication list (reason):    Added:   o Acetaminophen    Deleted: None    Changed:   o Diclofenac BID > BID prn  o Azelaic acid prn> daily  o Levothyroxine 88 mcg> 75 mcg  o Propanolol daily > BID    Additional Information:    None    Prior to Admission medications    Medication Sig Last Dose Taking? Auth Provider Long Term End Date   acetaminophen (TYLENOL) 325 MG tablet Take 325-650 mg by mouth every 6 hours as needed for mild pain  Yes Unknown, Entered By History     Ascorbic Acid (VITAMIN C) 500 MG CAPS Take 1 capsule by mouth daily Past Month Yes Reported, Patient     diclofenac (VOLTAREN) 1 % topical gel Apply 2 g topically 2 times daily as needed More than a month Yes Reported, Patient     levothyroxine (SYNTHROID/LEVOTHROID) 75 MCG tablet Take 75 mcg by mouth daily  Yes Unknown, Entered By History Yes    propranolol (INDERAL) 20 MG tablet Take 20 mg by mouth 2 times daily 2/2/2023 Yes Reported, Patient Yes    sodium chloride (OCEAN) 0.65 % nasal spray Spray 2 sprays into both nostrils 3 times daily Okay to use more often if desired. 2/2/2023 Yes Diane Brewer MD     azelaic acid (FINACIA) 15 % external gel Apply 1 inch topically daily 1/31/2023  Reported, Patient     levocetirizine (XYZAL) 5 MG tablet Take by mouth every evening 1/28/2023  Reported, Patient     rizatriptan (MAXALT) 10 MG tablet 1 TAB AT ONSET OF MIGRAINE.REPEAT ONCE AS NEEDED.MAX2 TABS/DAY OR 3 DAYS/WEEK. #9 MUST LAST 30 DAYS 1/18/2023  Reported, Patient     tretinoin (RETIN-A) 0.05 % external cream Apply 1 g topically At Bedtime 1/31/2023  Reported, Patient         Date completed: 02/03/23    Medication history completed by: AVERY COLEMAN  RPH

## 2023-02-03 NOTE — BRIEF OP NOTE
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]  Post-operative diagnosis Same as pre-operative diagnosis    Procedure: Procedure(s):  stealth assisted endoscopic endonasal anterior craniofacial resection, nasoseptal flap  fascia elsy graft, left upper thigh  Surgeon: Surgeon(s) and Role:  Panel 1:     * Germain Vyas MD - Primary     * Diane Brewer MD - Resident - Assisting  Panel 2:     * Abhi Tidwell MD - Primary     * Oliver Michael MD - Resident - Assisting  Anesthesia: General   Estimated Blood Loss: Less than 50 ml    Drains: None  Specimens:   ID Type Source Tests Collected by Time Destination   1 : Left posterior ethmoid Tissue Ethmoid Tissue SURGICAL PATHOLOGY EXAM Germain Vyas MD 2/2/2023  4:24 PM    2 : Left anterior ethmoid Tissue Ethmoid Tissue SURGICAL PATHOLOGY EXAM Germain Vyas MD 2/2/2023  4:32 PM    3 : left lateral nasal wall Tissue Other SURGICAL PATHOLOGY EXAM Germain Vyas MD 2/2/2023  4:34 PM    4 : Right olfactory cleft Tissue Other SURGICAL PATHOLOGY EXAM Germain Vyas MD 2/2/2023  4:52 PM    5 : Left anterior septal margin Tissue Other SURGICAL PATHOLOGY EXAM Germain Vyas MD 2/2/2023  5:28 PM    6 : Left inferior septal margin Tissue Other SURGICAL PATHOLOGY EXAM Germain Vyas MD 2/2/2023  5:29 PM    7 : Left sphenoid rostrum Tissue Other SURGICAL PATHOLOGY EXAM Germain Vyas MD 2/2/2023  5:31 PM    8 : Left olfactory cleft Tissue Other SURGICAL PATHOLOGY EXAM Germain Vyas MD 2/2/2023  5:38 PM    9 : Left olfactory cleft #2 Tissue Other SURGICAL PATHOLOGY EXAM Germain Vyas MD 2/2/2023  5:50 PM    10 : Left olfactory #3 Tissue Other SURGICAL PATHOLOGY EXAM Germain Vyas MD 2/2/2023  7:11 PM    11 : Posterior Dural Margin Tissue Other SURGICAL PATHOLOGY EXAM Germain Vyas MD 2/2/2023  7:47 PM    12 :  "Anterior Dural Margin Tissue Other SURGICAL PATHOLOGY EXAM Germain Vyas MD 2/2/2023  7:49 PM    13 : Medial Dural Margin Tissue Other SURGICAL PATHOLOGY EXAM Germain Vyas MD 2/2/2023  7:49 PM    14 : Lateral Dural Margin Tissue Other SURGICAL PATHOLOGY EXAM Germain Vyas MD 2/2/2023  7:51 PM    15 : Posterior Nerve Olfactory Margin Tissue Other SURGICAL PATHOLOGY EXAM Germain Vyas MD 2/2/2023  7:52 PM      Findings:   None.  Complications: None.  Implants:   Implant Name Type Inv. Item Serial No.  Lot No. LRB No. Used Action   GRAFT DURAGEN 2X2\"  - [id number]  GRAFT DURAGEN 2X2\"   [employer] [id number] N/A 1 Implanted       ENT Plan    - Skullbase precautions: no positive pressure ventilation or incentive spirometry, no blind instrumentation of the nose, sneeze with mouth open, avoid straining, coughing, and vomiting. Okay for nasal cannula from ENT standpoint.  - Scheduled Zofran x24 hours (patient may refuse)  - Nasal saline to begin POD1  - Okay for Afrin  - Good bowel regimen to avoid straining  - Remainder of cares per primary    Diane Brewer MD PGY4  ENT Resident  "

## 2023-02-03 NOTE — PLAN OF CARE
Admitted/transferred from: PACU  Reason for admission/transfer: Post Op monitoring   2 RN skin assessment: completed by Harriet VALVERDE and Jaqueline VALVERDE  Result of skin assessment and interventions/actions: Reddness in both nares of nose. Scab L knee. Graft site L upper thigh, covered with primapore..   Height, weight, drug calc weight: Done  Patient belongings (see Flowsheet)  MDRO education added to care planYes  ?

## 2023-02-03 NOTE — ANESTHESIA POSTPROCEDURE EVALUATION
Patient: Rosalind Murphy    Procedure: Procedure(s):  stealth assisted endoscopic endonasal anterior craniofacial resection, nasoseptal flap  fascia elsy graft, left upper thigh       Anesthesia Type:  General    Note:  Disposition: ICU            ICU Sign Out: Unable to perform physician to physician sign out ([place]et not going to [facility] service)   Postop Pain Control: Uneventful            Sign Out: Well controlled pain   PONV: No   Neuro/Psych: Uneventful            Sign Out: Acceptable/Baseline neuro status   Airway/Respiratory: Uneventful            Sign Out: Acceptable/Baseline resp. status   CV/Hemodynamics: Uneventful            Sign Out: Acceptable CV status; No obvious hypovolemia; No obvious fluid overload   Other NRE: NONE   DID A NON-ROUTINE EVENT OCCUR? No           Last vitals:  Vitals Value Taken Time   /61 02/02/23 2300   Temp 37.1  C (98.7  F) 02/02/23 2200   Pulse 64 02/02/23 2302   Resp 16 02/02/23 2200   SpO2 94 % 02/02/23 2302   Vitals shown include unvalidated device data.    Electronically Signed By: Boyd Field MD  February 2, 2023  11:04 PM

## 2023-02-03 NOTE — PROGRESS NOTES
"[facility], [place]  Neurosurgery Progress Note:  02/03/2023      Interval History: OR for EEA for resection of left sinonasal adenocarcinoma, dura resected    Assessment:  Rosalind Murphy is a 52-year-old woman who had several months of nasal obstruction and was found to have a left sided sinonasal mass.  The patient had a biopsy-proven left sinonasal adenocarcinoma.  She initially underwent partial left endoscopic resection of the tumor by Dr. Vyas on 1/17/2023. Further resection with negative margins was indicated after discussion of her case in the multidisciplinary tumor board. Patient understood the risks and alternatives of the procedure and agreed to proceed after signing informed consent.    Clinically Significant Risk Factors Present on Admission                       # Overweight: Estimated body mass index is 28.59 kg/m  as calculated from the following:    Height as of this encounter: 1.727 m (5' 8\").    Weight as of this encounter: 85.3 kg (188 lb 0.8 oz).        Plan:  Serial neuro exams  CSF leak precautions  Pain control  HOB > 30 degrees  Advance diet as tolerated  Bowel regimen  PRN antiemetics  Scheduled Zofran per ENT  Ok with Afrin   Nasal saline starting 2/3  IVF until taking adequate PO  PT/OT  SCDs for DVT proph  Transfer to floor- ENT primary     --------------------  Jose Fine MD  Neurosurgery resident, PGY-2  --------------------    Gen: Appears comfortable, NAD  Wound: clean, dry, intact  Neurologic:  Alert & Oriented to person, place, time, and situation  Follows commands briskly  Speech fluent, spontaneous. No aphasia or dysarthria.  No gaze preference. No apparent hemineglect.  PERRL, slight off center left pupil (baseline per pt), EOMI  Face symmetric with sensation intact to light touch  Palate elevates symmetrically, uvula midline, tongue protrudes midline  Trapezii muscles 5/5 bilaterally  No pronator drift     Del Tr Bi WE WF Gr   R 5 5 5 5 5 5   L " 5 5 5 5 5 5    HF KE KF DF PF EHL   R 5 5 5 5 5 5   L 5 5 5 5 5 5     Reflexes 2+ throughout    Sensation intact and symmetric to light touch throughout    Objective:   Temp:  [97.8  F (36.6  C)-98.7  F (37.1  C)] 97.8  F (36.6  C)  Pulse:  [57-76] 68  Resp:  [8-16] 10  BP: (105-123)/(57-96) 114/69  SpO2:  [92 %-98 %] 97 %  I/O last 3 completed shifts:  In: 3548 [I.V.:3548]  Out: 1690 [Urine:1690]        LABS:  Recent Labs   Lab 02/03/23  0650 02/02/23  2339 02/02/23  1119   * 162* 107*       Recent Labs   Lab 02/02/23  1423   HGB 13.3       IMAGING:  Recent Results (from the past 24 hour(s))   CT Facial Bones without Contrast    Narrative    Stealth CT imaging for purposes of stereotactic evaluation    Provided History: Adenocarcinoma (H)  ICD-10: Adenocarcinoma (H)  Comparison: MRI from 1/3/2023    Technique: CT imaging performed with axial, sagittal, and coronal  reconstructed images obtained without intravenous contrast.    Findings: Limited imaging for stereotactic localization demonstrates  resection of previously seen left posterior nasal cavity mass.  Osteomeatal type sinusitis on the left. Regarding the visualized  brain, there is no mass, extra-axial collection or midline shift.  Ventricles are not enlarged.       Impression    Impression: Postsurgical changes from resection of left posterior  nasal mass. Limited imaging performed primarily for the purposes of  stereotactic localization.    ZEE BELL MD         SYSTEM ID:  [id number]   CT Head w/o Contrast    Narrative    EXAM: CT HEAD W/O CONTRAST  2/2/2023 11:41 PM     HISTORY:  s/p sinonasal surgery, rule out hemorrhage       COMPARISON:  Brain MRI 1/3/2023.    TECHNIQUE: Using multidetector thin collimation helical acquisition  technique, axial, coronal and sagittal CT images from the skull base  to the vertex were obtained without intravenous contrast.   (topogram) image(s) also obtained and reviewed.    FINDINGS:  Postsurgical  changes of anterior craniofacial resection for sinonasal  adenocarcinoma. No intracranial hemorrhage, mass effect, or midline  shift. Small amount of pneumocephalus along the left frontal lobe. No  acute loss of gray-white matter differentiation in the cerebral  hemispheres. Ventricles are proportionate to the cerebral sulci. Clear  basal cisterns.    Postsurgical changes about the left ethmoid air cells and cribriform  plate with scattered debris/gas within the resection cavity. Layering  fluid in the left greater than right maxillary sinuses. Pseudophakia  on the left with scleral band.       Impression    IMPRESSION:   1. No acute intracranial pathology.   2. Post surgical changes from sinonasal mass removal. Expected  postoperative pneumocephalus.    I have personally reviewed the examination and initial interpretation  and I agree with the findings.    ZEE BELL MD         SYSTEM ID:  [id number]

## 2023-02-03 NOTE — OP NOTE
Procedure Date: 2/2/2023     PREOPERATIVE DIAGNOSIS: Left sinonasal intestinal-type adenocarcinoma     POSTOPERATIVE DIAGNOSIS: Left sinonasal intestinal-type adenocarcinoma     PROCEDURES PERFORMED:  1.  Endoscopic endonasal transcribriform approach to the anterior fossa, intradural  2.  Endoscopic resection of intradural tumor (intestinal type adenocarcinoma) with resection of dural and olfactory bulb margins  3.  Left fascia elsy graft harvest  4.  Use of intraoperative Stealth navigation     ATTENDING SURGEON:  Abhi Tidwell MD.     CO-SURGEON:  Germain Vyas M.D.     RESIDENT SURGEONS:  Oliver Michael MD; Diane Brewer MD     ANESTHESIA:  General.     ESTIMATED BLOOD LOSS:  25 mL.     INTRAOPERATIVE FINDINGS: Sinonasal adenocarcinoma was resected with negative margins. Dura along the floor of the anterior fossa and left olfactory bulb was resected and sent for frozen pathology. Multi-layer closure with fascia elsy and septal flap was performed. No CSF leak was noted at the end of the case.     INDICATIONS FOR PROCEDURE: Rosalind Murphy is a 52-year-old woman who had several months of nasal obstruction and was found to have a left sided sinonasal mass.  The patient had a endonasal surgery with Dr. Vyas for pathology-proved left sinonasal adenocarcinoma, which intraoperatively was found to enter the anterior skull base on 1/17/2023. Further resection with negative margins was indicated after discussion of her case in the multidisciplinary tumor board. We reviewed the rationale, risks and alternatives of the procedure and agreed to proceed after signing informed consent.     DESCRIPTION OF PROCEDURE:  The patient was marked and consented in the preoperative area. She was brought to the operating room where she was intubated under general anesthesia. An arterial line and ivory were placed. The head of bed was rotated 180 degrees.  The Flores was attached to the patient's head and then to the bed.  The  preoperative imaging was loaded into the Stealth software and registered the Stealth software to the patient's face. Good registration was obtained.  We then planned for a left fascia elsy graft, and possibly a left abdominal fat graft.  The patient was then prepped and draped in normal sterile fashion.  A timeout was performed. At this time, our ENT colleague, Dr. Vyas, took over the case and initiated the approach.     The ENT team resected the tumor in the nasal cavity up to the cribriform plate, following from the prior surgery. The anterior and posterior ethmoidal arteries were coagulated and divided. At this stage, neurosurgery team was paged to the room and we scrubbed in to perform additional exposure and then removal of the anterior cranial fossa bone to expose the dura of the anterior skull base. This was done using the high speed drill with lillian drill bit. The bone was thinned out using the drill bit then removed with kerrison rongeurs and pituitary. We exposed the entire cribriform on the left side, beginning from the first olfactory filum. A small rim of bone was kept anteriorly to help with the future reconstruction. The posterior border of the bone extended just beyond the area of the posterior ethmoidal artery. Medially, the olfactory nerve mucosa was  from the cribriform plate and resected with scissors. These were sent for permanent pathology. Hemostasis was controlled with bipolar cautery. Next, we thinned out the bone over the medial aspect of the cribriform plate, keeping the sebastien carmela intact, and these were subsequently removed using kerrison rongeurs. Once the anterior skull base dura was exposed, we used the feather blade to open up the dura, and proceeded to resect it in all 4 borders using scissors. The left olfactory bulb was also resected and the four dural margins and distal olfactory margin were sent for frozen section. The cortex appeared healthy and no major bleeding  was encountered. At this point, we irrigated the field extensively and achieved hemostasis with a combination of bipolar cautery, floseal, and patties. We then moved towards reconstruction. Grossly, there did not appear to be obvious intracranial involvement by the tumor.    We then turned our attention to harvest the left fascia elsy graft to use in our anterior cranial fossa reconstruction. The ENT service harvested the fascia elsy graft and we placed this along the anterior skull base for reconstruction.  We first placed a small [id number] layer of duragen which was cut to appropriate size and tucked as an inlay graft. We then placed the fascia elsy graft as an epidural tuck. These layers were lined with surgicel. Next, Dr. Vyas used the nasoseptal flap which was previously harvested by the ENT team and placed it on top. ICG was used to demonstrate good vascularization throughout the nasoseptal flap. Finally, a layer of duraseal was injected and no CSF leak was noted.  Margins returned negative from the pathology on frozen section.      At this point, our ENT colleague, Dr. Vyas, completed the closure.  Please refer to his dictation for details of his procedure. Once the closure had been completed, the drapes were taken down, and the Flores was released from the bed, and the patient's head.  General anesthesia was gently reversed and the patient was extubated.  The patient was then transferred back to the hospital bed and taken to the postoperative care area for her postoperative cares.  Dr. Vyas then updated the family.     All counts were correct at the end of the procedure x 2.       Dr. Abhi Tidwell was present and scrubbed throughout all critical portions of the l portion of the case and otherwise immediately available.     -----------------------------------  Oliver Michael MD, MS  Neurosurgery PGY-6    I was present and scrubbed during the key portions of the neurosurgical portion of the operation  and I was immediately available for the entire procedure.    Abhi Tidwell MD PhD

## 2023-02-03 NOTE — PLAN OF CARE
ICU End of Shift Summary. See flowsheets for vital signs and detailed assessment.    Changes this shift: Provided cares from [id number].    Neuros: Alert and oriented x4, pleasant, call light appropriate, able to make needs known.  JACQUES, at baseline. Moving all extremities, ambulating with PT/OT in hallway; neuros intact.  Complained of frontal headache and incisional pain; PRN dilaudid given x1, fully effective.   and daughter at bedside.  Cardiac: SR-bradycardia, HR 55-60s. BP stable. Afebrile.  Resp: RA, O2 sat >95%.  COVID swab obtained; left, anterior nasal only per ENT.  GI: Regular diet, tolerating oral intake. C/o intermittent nausea, on scheduled zofran. No emesis episode on shift.  No BM on shift, on bowel regimen.  : Adequate urine output, ivory removed.    Plan:  Continue to monitor and follow POC.    Transferred to: [facility] 6210- at 1350.  Status at time of transfer: Stable.  Belongings: With patient and family.  Ivory removed? (if no, why?): Yes.  Chart and medications: With patient and transport.  Family notified:  and daughter at bedside.

## 2023-02-03 NOTE — ANESTHESIA CARE TRANSFER NOTE
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    Anesthesia Type:   General     Note:    Oropharynx: oropharynx clear of all foreign objects  Level of Consciousness: drowsy  Oxygen Supplementation: face mask  Level of Supplemental Oxygen (L/min / FiO2): 8  Independent Airway: airway patency satisfactory and stable  Dentition: dentition unchanged  Vital Signs Stable: post-procedure vital signs reviewed and stable  Report to RN Given: handoff report given  Patient transferred to: PACU    Handoff Report: Identifed the Patient, Identified the Reponsible Provider, Reviewed the pertinent medical history, Discussed the surgical course, Reviewed Intra-OP anesthesia mangement and issues during anesthesia, Set expectations for post-procedure period and Allowed opportunity for questions and acknowledgement of understanding      Vitals:  Vitals Value Taken Time   /63 02/02/23 2210   Temp     Pulse 72 02/02/23 2212   Resp     SpO2 97 % 02/02/23 2212   Vitals shown include unvalidated device data.    Electronically Signed By: Ruddy Parisi MD  February 2, 2023  10:12 PM

## 2023-02-04 ENCOUNTER — APPOINTMENT (OUTPATIENT)
Dept: OCCUPATIONAL THERAPY | Facility: CLINIC | Age: 53
End: 2023-02-04
Attending: OTOLARYNGOLOGY
Payer: COMMERCIAL

## 2023-02-04 ENCOUNTER — APPOINTMENT (OUTPATIENT)
Dept: CT IMAGING | Facility: CLINIC | Age: 53
End: 2023-02-04
Attending: OTOLARYNGOLOGY
Payer: COMMERCIAL

## 2023-02-04 LAB — GLUCOSE BLDC GLUCOMTR-MCNC: 97 MG/DL (ref 70–99)

## 2023-02-04 PROCEDURE — 70450 CT HEAD/BRAIN W/O DYE: CPT

## 2023-02-04 PROCEDURE — 93005 ELECTROCARDIOGRAM TRACING: CPT

## 2023-02-04 PROCEDURE — 250N000013 HC RX MED GY IP 250 OP 250 PS 637: Performed by: PHYSICIAN ASSISTANT

## 2023-02-04 PROCEDURE — 250N000011 HC RX IP 250 OP 636: Performed by: OTOLARYNGOLOGY

## 2023-02-04 PROCEDURE — 120N000002 HC R&B MED SURG/OB UMMC

## 2023-02-04 PROCEDURE — 93010 ELECTROCARDIOGRAM REPORT: CPT | Performed by: INTERNAL MEDICINE

## 2023-02-04 PROCEDURE — 999N000248 HC STATISTIC IV INSERT WITH US BY RN

## 2023-02-04 PROCEDURE — 70450 CT HEAD/BRAIN W/O DYE: CPT | Mod: 26 | Performed by: RADIOLOGY

## 2023-02-04 PROCEDURE — 250N000011 HC RX IP 250 OP 636: Performed by: PHYSICIAN ASSISTANT

## 2023-02-04 PROCEDURE — 999N000147 HC STATISTIC PT IP EVAL DEFER: Performed by: PHYSICAL THERAPIST

## 2023-02-04 PROCEDURE — 97535 SELF CARE MNGMENT TRAINING: CPT | Mod: GO

## 2023-02-04 RX ORDER — ONDANSETRON 4 MG/1
4 TABLET, ORALLY DISINTEGRATING ORAL EVERY 8 HOURS
Status: COMPLETED | OUTPATIENT
Start: 2023-02-04 | End: 2023-02-04

## 2023-02-04 RX ORDER — AMOXICILLIN 250 MG
2 CAPSULE ORAL 2 TIMES DAILY
Status: DISCONTINUED | OUTPATIENT
Start: 2023-02-04 | End: 2023-02-05 | Stop reason: HOSPADM

## 2023-02-04 RX ORDER — METOCLOPRAMIDE HYDROCHLORIDE 5 MG/ML
10 INJECTION INTRAMUSCULAR; INTRAVENOUS EVERY 6 HOURS
Status: DISCONTINUED | OUTPATIENT
Start: 2023-02-04 | End: 2023-02-05

## 2023-02-04 RX ORDER — KETOROLAC TROMETHAMINE 15 MG/ML
15 INJECTION, SOLUTION INTRAMUSCULAR; INTRAVENOUS EVERY 6 HOURS PRN
Status: DISCONTINUED | OUTPATIENT
Start: 2023-02-04 | End: 2023-02-05 | Stop reason: HOSPADM

## 2023-02-04 RX ORDER — PROMETHAZINE HYDROCHLORIDE 25 MG/ML
12.5 INJECTION INTRAMUSCULAR; INTRAVENOUS EVERY 6 HOURS PRN
Status: DISCONTINUED | OUTPATIENT
Start: 2023-02-04 | End: 2023-02-04

## 2023-02-04 RX ORDER — ONDANSETRON 4 MG/1
4 TABLET, ORALLY DISINTEGRATING ORAL EVERY 8 HOURS PRN
Qty: 10 TABLET | Refills: 0 | Status: SHIPPED | OUTPATIENT
Start: 2023-02-04

## 2023-02-04 RX ORDER — AMOXICILLIN 250 MG
2 CAPSULE ORAL 2 TIMES DAILY PRN
Qty: 30 TABLET | Refills: 1 | Status: SHIPPED | OUTPATIENT
Start: 2023-02-04 | End: 2023-02-28

## 2023-02-04 RX ORDER — ONDANSETRON 2 MG/ML
8 INJECTION INTRAMUSCULAR; INTRAVENOUS EVERY 8 HOURS
Status: COMPLETED | OUTPATIENT
Start: 2023-02-04 | End: 2023-02-04

## 2023-02-04 RX ORDER — SCOLOPAMINE TRANSDERMAL SYSTEM 1 MG/1
1 PATCH, EXTENDED RELEASE TRANSDERMAL
Status: DISCONTINUED | OUTPATIENT
Start: 2023-02-04 | End: 2023-02-04

## 2023-02-04 RX ORDER — OXYMETAZOLINE HYDROCHLORIDE 0.05 G/100ML
2 SPRAY NASAL 3 TIMES DAILY PRN
Status: DISCONTINUED | OUTPATIENT
Start: 2023-02-04 | End: 2023-02-05 | Stop reason: HOSPADM

## 2023-02-04 RX ORDER — OXYCODONE HYDROCHLORIDE 5 MG/1
5-10 TABLET ORAL EVERY 6 HOURS PRN
Qty: 15 TABLET | Refills: 0 | Status: SHIPPED | OUTPATIENT
Start: 2023-02-04 | End: 2023-02-07

## 2023-02-04 RX ORDER — LEVOTHYROXINE SODIUM 75 UG/1
75 TABLET ORAL DAILY
Status: DISCONTINUED | OUTPATIENT
Start: 2023-02-04 | End: 2023-02-04

## 2023-02-04 RX ORDER — POLYETHYLENE GLYCOL 3350 17 G/17G
17 POWDER, FOR SOLUTION ORAL 2 TIMES DAILY PRN
Qty: 510 G | Refills: 1 | Status: SHIPPED | OUTPATIENT
Start: 2023-02-04 | End: 2023-02-28

## 2023-02-04 RX ORDER — ACETAMINOPHEN 325 MG/10.15ML
650 LIQUID ORAL EVERY 4 HOURS PRN
Status: DISCONTINUED | OUTPATIENT
Start: 2023-02-04 | End: 2023-02-05 | Stop reason: HOSPADM

## 2023-02-04 RX ADMIN — ONDANSETRON 4 MG: 4 TABLET, ORALLY DISINTEGRATING ORAL at 08:57

## 2023-02-04 RX ADMIN — PROCHLORPERAZINE EDISYLATE 10 MG: 5 INJECTION INTRAMUSCULAR; INTRAVENOUS at 03:43

## 2023-02-04 RX ADMIN — METOCLOPRAMIDE 10 MG: 5 INJECTION, SOLUTION INTRAMUSCULAR; INTRAVENOUS at 16:29

## 2023-02-04 RX ADMIN — LEVOTHYROXINE SODIUM 75 MCG: 0.05 TABLET ORAL at 12:31

## 2023-02-04 RX ADMIN — PROCHLORPERAZINE MALEATE 10 MG: 10 TABLET ORAL at 11:07

## 2023-02-04 RX ADMIN — SALINE NASAL SPRAY 2 SPRAY: 1.5 SOLUTION NASAL at 09:06

## 2023-02-04 RX ADMIN — ONDANSETRON 8 MG: 2 INJECTION INTRAMUSCULAR; INTRAVENOUS at 17:54

## 2023-02-04 RX ADMIN — SALINE NASAL SPRAY 2 SPRAY: 1.5 SOLUTION NASAL at 20:25

## 2023-02-04 RX ADMIN — METOCLOPRAMIDE 10 MG: 5 INJECTION, SOLUTION INTRAMUSCULAR; INTRAVENOUS at 22:41

## 2023-02-04 RX ADMIN — SALINE NASAL SPRAY 2 SPRAY: 1.5 SOLUTION NASAL at 16:28

## 2023-02-04 RX ADMIN — PROPRANOLOL HYDROCHLORIDE 20 MG: 20 TABLET ORAL at 20:25

## 2023-02-04 RX ADMIN — ACETAMINOPHEN 975 MG: 325 TABLET ORAL at 03:43

## 2023-02-04 RX ADMIN — PROPRANOLOL HYDROCHLORIDE 20 MG: 20 TABLET ORAL at 12:31

## 2023-02-04 RX ADMIN — ACETAMINOPHEN 650 MG: 325 TABLET ORAL at 12:34

## 2023-02-04 ASSESSMENT — VISUAL ACUITY
OU: BASELINE

## 2023-02-04 ASSESSMENT — ACTIVITIES OF DAILY LIVING (ADL)
ADLS_ACUITY_SCORE: 20

## 2023-02-04 NOTE — PLAN OF CARE
Status: POD #1 s/p EEA resection of left sinonasal adenocarcinoma with L upper thigh graft.  Vitals: VSS   Neuros: A&Ox4. Strengths 5/5 throughout. L pupil irregular shape, baseline per pt from cataract surgery.   IV: PIV SL  Labs/Electrolytes: WNL  Resp/trach: WNL on RA. No reports of sob   Diet: regular diet - poor intake d/t nausea and vomiting. Emesis x1 this shift. Scheduled Zofran ineffective, compazine given x1 with mild relief.  Bowel status: small BM 2/3. BS audible. Passing gas  : voiding spontaneously   Skin: Bilateral cheek skin tears. L thigh flap covered with primapre, CDI. L nostril with small serosang drainage.   Pain: c/o headache pain, managing with PRN tylenol and cold wash cloths  Activity: SBA, GB  Social: family at bedside this shift   Plan: continue to manage nausea and follow POC  Updates this shift: WOC consulted for skin tears on bilateral cheeks from tape per pt.

## 2023-02-04 NOTE — PLAN OF CARE
Vitals: VSS on RA  Neuros: A&Ox4. Strengths 5/5 throughout. L pupil irregular shape, baseline per pt from cataract surgery. HA present 3-4/10.   IV: PIV SL'd  Resp/trach: WNL on RA  Diet: Regular diet, taking in liquids but only able to eat a few crackers d/t nausea. Liquids and snacks at bedside and offered throughout shift.   Bowel status: BS+x4, loose BM this shift. Nausea continuously throughout shift, scheduled zofran and prn compazine ineffective. Emesis x1 following meds. MD notified and prn phenegran and reglan ordered-awaiting meds from pharmacy.   : Voiding  Skin: Bilateral cheek skin tears. L thigh flap covered with primapre, CDI. L nostril with small serosang drainage, R nostril with small amount of clear/ yellow tinged drainage this afternoon- ENT notified. Denied salty/metallic taste. Vial at bedside to try to catch sample.   Pain: C/o HA- 3-4/10. Cold applied to forehead. Tylenol given with emesis shortly after. Will reattempt following nausea meds.   Activity: Up with SBA  Social:  and kids at bedside  Plan: Attempt to catch sample of nasal drainage. Encourage PO intake.

## 2023-02-04 NOTE — PLAN OF CARE
Status: POD #1 s/p EEA resection of left sinonasal adenocarcinoma with L upper thigh graft.  Vitals: VSS   Neuros: AOx4. Strength 5/5 throughout. L pupil irregular shaped, blurry vision - baseline. Sluggish L pupil @4AM assessment, pet pt this is baseline and happens intermittently. Denied N/T. Denied salty, metallic taste.   IV: PIV SL.   Labs/Electrolytes: WNL  Resp/trach: LSC. No SOB. Congestion.   Diet: Regular. Intermittently nauseous, no emesis during shift. Scheduled zofran and PRN compazine given.   Bowel status: LBM 2/3.   : Voiding spontaneously.   Skin: L thigh flap covered with primapore, CDI. R/L nostril with small drainage, dried blood around nostrils.   Pain: C/o frontal HA. Managed with scheduled tylenol and cold wash cloth.   Activity: SBA + GB.   Plan: Cont POC.

## 2023-02-04 NOTE — PROGRESS NOTES
I spoke with , Brad, over phone. Rosalind is having some PONV, which is common for her after surgery. She has trouble keeping meds/food down. I have low concern for leak at this point. HA remain mild. She needs IV pain control. IV acetaminophen not available, so will opt for toradol, which I discussed with NSGY. IV meds for nausea added as well. Will continue to monitor Rosalind's progress.     Germain Vyas MD    [facility]  [facility]  [facility]  Department of Otolaryngology - Head & Neck Surgery

## 2023-02-04 NOTE — DISCHARGE SUMMARY
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 It was recommended that she undergo operative intervention and the patient consented to the above procedure after detailed explanation of the risks and benefits of said procedure.    Hospital Course: The patient was admitted to the hospital and underwent the above mentioned procedure. She tolerated the procedure without any intra- or ruben-operative complications. Please see the operative report for full details of the procedure. The patient was admitted for post-operative monitoring. Her postoperative course was complicated by nausea and vomiting which made it difficult for her to take her medications and maintain hydration/nutrition. This resolved by today. At discharge, the patient's pain was well controlled, the patient was voiding on her own, and  was ambulating and tolerating a regular diet.     Discharge Exam:  Vitals:    02/05/23 0500 02/05/23 0713 02/05/23 1139 02/05/23 1433   BP: 116/76 123/78 115/70 115/66   BP Location: Right arm Right arm Left arm Right arm   Pulse: 74 67 76 70   Resp: 16 16 16 16   Temp: 98  F (36.7  C) 98.4  F (36.9  C) 98.3  F (36.8  C) 97.6  F (36.4  C)   TempSrc: Oral Oral Oral Oral   SpO2: 96% 99% 96% 97%   Weight:       Height:                      General: Alert and oriented x 3, No acute distress               HEENT: EOMI. HB 1/6. Anterior nares dry with old  blood, posterior oropharyngeal wall dry.               Pulmonary: Breathing non-labored, no stridor, no accessory muscle use.               Extremities: Left thigh donor site flat    Discharge Medications:     Medication List      Started    ondansetron 4 MG ODT tab  Commonly known as: ZOFRAN ODT  4 mg, Oral, EVERY 8 HOURS PRN     oxyCODONE 5 MG tablet  Commonly known as: ROXICODONE  5-10 mg, Oral, EVERY 6 HOURS PRN     polyethylene glycol 17 GM/Dose powder  Commonly known as: MIRALAX  17 g, Oral, 2 TIMES DAILY PRN     senna-docusate 8.6-50 MG tablet  Commonly known as: SENOKOT-S/PERICOLACE  2 tablets, Oral, 2 TIMES DAILY PRN        Modified    * sodium chloride 0.65 % nasal spray  Commonly known as: OCEAN  2 sprays, Both Nostrils, 3 TIMES DAILY, Okay to use more often if desired.  What changed: Another medication with the same name was added. Make sure you understand how and when to take each.     * sodium chloride 0.65 % nasal spray  Commonly known as: OCEAN  2 sprays, Both Nostrils, 3 TIMES DAILY  What changed: You were already taking a medication with the same name, and this prescription was added. Make sure you understand how and when to take each.         * This list has 2 medication(s) that are the same as other medications prescribed for you. Read the directions carefully, and ask your doctor or other care provider to review them with you.                Discharge Procedure Orders   Reason for your hospital stay   Order Comments: Nose surgery.     Activity   Order Comments: Rest without vigorous activity for the next couple of weeks until seen at clinic follow up.     Order Specific Question Answer Comments   Is discharge order? Yes      When to contact your care team   Order Comments: Please call the ENT if you are having persistent clear nasal drainage which rushes out as you stand or lean forward. Some bloody and mucus discharge is expected, particularly after you use your nasal saline spray.     Wound  care and dressings   Order Comments: You may use your nasal sling as needed under your nose for drainage.     Diet   Order Comments: Resume normal diet.     Order Specific Question Answer Comments   Is discharge order? Yes        Dispo: To home in good condition. All of the patient's questions/concerns have been addressed at this time.     Mundo Lemus  Otolaryngology-Head & Neck Surgery Resident  Please contact ENT by dialing [phone] and entering job code [phone].

## 2023-02-04 NOTE — PROVIDER NOTIFICATION
"ENT notified, \"Intermittent nasal drainage, no salty/metallic taste. Not observed, given per previous nurse report. I put on a nasal sling.\"   "

## 2023-02-04 NOTE — PROGRESS NOTES
"Otolaryngology Progress Note      S: No acute events overnight. Some nausea with oral intake but improved with Zofran and compazine and doing well. BMx1 yesterday.No anterior nasal drainage or salty/metallic taste.    O: /79 (BP Location: Right arm)   Pulse 64   Temp 98.1  F (36.7  C) (Oral)   Resp 16   Ht 1.727 m (5' 8\")   Wt 85.3 kg (188 lb 0.8 oz)   SpO2 96%   BMI 28.59 kg/m     General: Alert and oriented x 3, No acute distress   HEENT: EOMI. HB 1/6. Anterior nares dry with old blood, posterior oropharyngeal wall dry.   Pulmonary: Breathing non-labored, no stridor, no accessory muscle use.   Extremities: Left thigh donor site flat      A/P: Rosalind Murphy is a 52 year old female with a past medical history of sinonasal adenocarcinoma, intestinal type, now s/p anterior craniofacial resection with resection of dural margins and repair with right nasoseptal flap and fascia elsy 2/2/23.    Neuro:  - Pain control: Tylenol, oxycodone PRN    HEENT:  - Monitor for signs and symptoms of CSF leak  - Skullbase precautions: no positive pressure ventilation or incentive spirometry, no blind instrumentation of the nose, sneeze with mouth open, avoid straining, coughing, and vomiting. Okay for nasal cannula from ENT standpoint.  - Scheduled Zofran x24 hours (patient may refuse)  - Nasal saline   - Okay for Afrin    Respiratory:  - supplemental O2 PRN to keep sats >92%    CV/heme:  - hemodynamically stable  - PTA propanolol    FEN/GI:  - Regular diet  - Bowel regimen ordered  - Zofran scheduled x24 hours then PRN    :  - voiding independently    Endo  - PTA synthroid  - ISS to maintain BG < 180    Heme/ID:  - YUSRA, perioperative antibiotics complete    PPX:  - SCDs  - No IS    Consults: Neurosurgery    Dispo: Improved PO intake    -- Patient and plan discussed with staff Dr. Akash Vyas.    Leslie Mcdonald MD    "

## 2023-02-04 NOTE — PLAN OF CARE
PT evaluation cx and deferred.  OT completed evaluation and patient demonstrating SBA ambulation with no additional acute PT indicated to address balance/gait. OT will continue to follow patient and address ambulation from endurance standpoint.  Will complete PT order.

## 2023-02-04 NOTE — PROGRESS NOTES
"[facility], [place]  Neurosurgery Progress Note:  02/04/2023      Interval History: No acute events overnight. No signs of CSF leak.    Assessment:  Rosalind Murphy is a 52-year-old woman who had several months of nasal obstruction and was found to have a left sided sinonasal mass.  The patient had a biopsy-proven left sinonasal adenocarcinoma.  She initially underwent partial left endoscopic resection of the tumor by Dr. Vyas on 1/17/2023. Further resection with negative margins was indicated after discussion of her case in the multidisciplinary tumor board. Patient understood the risks and alternatives of the procedure and agreed to proceed after signing informed consent.    Clinically Significant Risk Factors                         # Overweight: Estimated body mass index is 28.59 kg/m  as calculated from the following:    Height as of this encounter: 1.727 m (5' 8\").    Weight as of this encounter: 85.3 kg (188 lb 0.8 oz)., PRESENT ON ADMISSION       Plan:  Serial neuro exams  CSF leak precautions  Pain control  HOB > 30 degrees  Advance diet as tolerated  Bowel regimen  PRN antiemetics  Nasal saline starting 2/3  PT/OT  SCDs for DVT proph  Floor status    --------------------  Jose Fine MD  Neurosurgery resident, PGY-2  --------------------    Gen: Appears comfortable, NAD  Wound: clean, dry, intact  Neurologic:  Alert & Oriented to person, place, time, and situation  Follows commands briskly  Speech fluent, spontaneous. No aphasia or dysarthria.  No gaze preference. No apparent hemineglect.  PERRL, slight off center left pupil (baseline per pt), EOMI  Face symmetric with sensation intact to light touch  Palate elevates symmetrically, uvula midline, tongue protrudes midline  Trapezii muscles 5/5 bilaterally  No pronator drift     Del Tr Bi WE WF Gr   R 5 5 5 5 5 5   L 5 5 5 5 5 5    HF KE KF DF PF EHL   R 5 5 5 5 5 5   L 5 5 5 5 5 5     Reflexes 2+ throughout    Sensation intact " and symmetric to light touch throughout    Objective:   Temp:  [97.9  F (36.6  C)-98.5  F (36.9  C)] 98.1  F (36.7  C)  Pulse:  [64-85] 64  Resp:  [12-18] 16  BP: (104-132)/(68-83) 107/79  SpO2:  [95 %-99 %] 96 %  I/O last 3 completed shifts:  In: 1326.67 [P.O.:840; I.V.:486.67]  Out: 1635 [Urine:1435; Emesis/NG output:200]        LABS:  Recent Labs   Lab 02/04/23  0608 02/03/23  0738 02/03/23  0650   NA  --  140  --    POTASSIUM  --  4.1  --    CHLORIDE  --  104  --    CO2  --  26  --    ANIONGAP  --  10  --    GLC 97 118* 117*   BUN  --  8.7  --    CR  --  0.74  --    NERY  --  8.6  --        Recent Labs   Lab 02/03/23  0738   WBC 10.2   RBC 3.99   HGB 12.2   HCT 37.3   MCV 94   MCH 30.6   MCHC 32.7   RDW 11.9          IMAGING:  No results found for this or any previous visit (from the past 24 hour(s)).

## 2023-02-04 NOTE — PROVIDER NOTIFICATION
Provider Notification: Neurosurgery     NON-URGENT: 6210-2 SAGRARIO Murphy    Had emesis x1 right after Tylenol was given. Compazine given. Can I give Tylenol again?     Pt also has tape marks on bilateral cheeks, do you want a WOC order?    Thanks, Alexa [phone]    Response: provider gave verbal order that it is okay to give tylenol. WOC order placed for laceration on bilateral cheeks from tape from surgery.

## 2023-02-05 ENCOUNTER — APPOINTMENT (OUTPATIENT)
Dept: OCCUPATIONAL THERAPY | Facility: CLINIC | Age: 53
End: 2023-02-05
Attending: OTOLARYNGOLOGY
Payer: COMMERCIAL

## 2023-02-05 VITALS
OXYGEN SATURATION: 97 % | RESPIRATION RATE: 16 BRPM | TEMPERATURE: 97.6 F | HEIGHT: 68 IN | BODY MASS INDEX: 28.5 KG/M2 | DIASTOLIC BLOOD PRESSURE: 66 MMHG | SYSTOLIC BLOOD PRESSURE: 115 MMHG | HEART RATE: 70 BPM | WEIGHT: 188.05 LBS

## 2023-02-05 LAB — GLUCOSE BLDC GLUCOMTR-MCNC: 89 MG/DL (ref 70–99)

## 2023-02-05 PROCEDURE — 97530 THERAPEUTIC ACTIVITIES: CPT | Mod: GO

## 2023-02-05 PROCEDURE — 250N000013 HC RX MED GY IP 250 OP 250 PS 637

## 2023-02-05 PROCEDURE — 250N000011 HC RX IP 250 OP 636: Performed by: OTOLARYNGOLOGY

## 2023-02-05 PROCEDURE — 250N000013 HC RX MED GY IP 250 OP 250 PS 637: Performed by: PHYSICIAN ASSISTANT

## 2023-02-05 RX ORDER — PROCHLORPERAZINE MALEATE 10 MG
10 TABLET ORAL EVERY 8 HOURS PRN
Qty: 12 TABLET | Refills: 0 | Status: SHIPPED | OUTPATIENT
Start: 2023-02-05 | End: 2023-02-28

## 2023-02-05 RX ORDER — METOCLOPRAMIDE HYDROCHLORIDE 5 MG/ML
10 INJECTION INTRAMUSCULAR; INTRAVENOUS EVERY 6 HOURS PRN
Status: DISCONTINUED | OUTPATIENT
Start: 2023-02-05 | End: 2023-02-05 | Stop reason: HOSPADM

## 2023-02-05 RX ORDER — ONDANSETRON 2 MG/ML
8 INJECTION INTRAMUSCULAR; INTRAVENOUS EVERY 6 HOURS PRN
Status: DISCONTINUED | OUTPATIENT
Start: 2023-02-05 | End: 2023-02-05 | Stop reason: HOSPADM

## 2023-02-05 RX ORDER — ONDANSETRON 4 MG/1
4 TABLET, ORALLY DISINTEGRATING ORAL EVERY 6 HOURS PRN
Status: DISCONTINUED | OUTPATIENT
Start: 2023-02-05 | End: 2023-02-05 | Stop reason: HOSPADM

## 2023-02-05 RX ADMIN — PROCHLORPERAZINE MALEATE 10 MG: 10 TABLET ORAL at 10:50

## 2023-02-05 RX ADMIN — PROCHLORPERAZINE MALEATE 10 MG: 10 TABLET ORAL at 01:40

## 2023-02-05 RX ADMIN — LEVOTHYROXINE SODIUM 75 MCG: 0.05 TABLET ORAL at 09:12

## 2023-02-05 RX ADMIN — ACETAMINOPHEN 650 MG: 325 SOLUTION ORAL at 13:52

## 2023-02-05 RX ADMIN — METOCLOPRAMIDE 10 MG: 5 INJECTION, SOLUTION INTRAMUSCULAR; INTRAVENOUS at 05:40

## 2023-02-05 RX ADMIN — SALINE NASAL SPRAY 2 SPRAY: 1.5 SOLUTION NASAL at 09:13

## 2023-02-05 RX ADMIN — PROPRANOLOL HYDROCHLORIDE 20 MG: 20 TABLET ORAL at 09:12

## 2023-02-05 RX ADMIN — ACETAMINOPHEN 650 MG: 325 SOLUTION ORAL at 09:12

## 2023-02-05 RX ADMIN — ACETAMINOPHEN 650 MG: 325 SOLUTION ORAL at 01:37

## 2023-02-05 RX ADMIN — SALINE NASAL SPRAY 2 SPRAY: 1.5 SOLUTION NASAL at 14:17

## 2023-02-05 ASSESSMENT — ACTIVITIES OF DAILY LIVING (ADL)
ADLS_ACUITY_SCORE: 20
ADLS_ACUITY_SCORE: 24
ADLS_ACUITY_SCORE: 20
ADLS_ACUITY_SCORE: 24
ADLS_ACUITY_SCORE: 20
ADLS_ACUITY_SCORE: 20
ADLS_ACUITY_SCORE: 24
ADLS_ACUITY_SCORE: 20

## 2023-02-05 ASSESSMENT — VISUAL ACUITY
OU: BASELINE
OU: BASELINE

## 2023-02-05 NOTE — PROVIDER NOTIFICATION
ENT notified about pt's continued nausea, bilateral nasal drainage, and headache. Head CT completed with no significant findings.

## 2023-02-05 NOTE — PROGRESS NOTES
"[facility], [place]  Neurosurgery Progress Note:  02/05/2023      Interval History: Nausea yesterday. NAEO. CT completed--stable.     Assessment:  Rosalind Murphy is a 52-year-old woman who had several months of nasal obstruction and was found to have a left sided sinonasal mass.  The patient had a biopsy-proven left sinonasal adenocarcinoma.  She initially underwent partial left endoscopic resection of the tumor by Dr. Vyas on 1/17/2023. Further resection with negative margins was indicated after discussion of her case in the multidisciplinary tumor board. Patient understood the risks and alternatives of the procedure and agreed to proceed after signing informed consent.    Clinically Significant Risk Factors                         # Overweight: Estimated body mass index is 28.59 kg/m  as calculated from the following:    Height as of this encounter: 1.727 m (5' 8\").    Weight as of this encounter: 85.3 kg (188 lb 0.8 oz)., PRESENT ON ADMISSION       Plan:  Serial neuro exams  CSF leak precautions  Pain control  HOB > 30 degrees  Advance diet as tolerated  Bowel regimen  PRN antiemetics  Nasal saline starting 2/3  PT/OT  SCDs for DVT proph, ok for DVT ppx from nrsg perspective  Floor status    --------------------  Meghan Dumont MD  Neurosurgery resident, PGY-3  --------------------    Gen: Appears comfortable, NAD  Wound: clean, dry, intact  Neurologic:  Alert & Oriented to person, place, time, and situation  Follows commands briskly  Speech fluent, spontaneous. No aphasia or dysarthria.  No gaze preference. No apparent hemineglect.  PERRL, slight off center left pupil (baseline per pt), EOMI  Face symmetric with sensation intact to light touch  Palate elevates symmetrically, uvula midline, tongue protrudes midline  Trapezii muscles 5/5 bilaterally  No pronator drift     Del Tr Bi WE WF Gr   R 5 5 5 5 5 5   L 5 5 5 5 5 5    HF KE KF DF PF EHL   R 5 5 5 5 5 5   L 5 5 5 5 5 5 "     Reflexes 2+ throughout    Sensation intact and symmetric to light touch throughout    Objective:   Temp:  [97.8  F (36.6  C)-98.4  F (36.9  C)] 98.3  F (36.8  C)  Pulse:  [63-76] 76  Resp:  [16] 16  BP: (115-127)/(70-81) 115/70  SpO2:  [95 %-99 %] 96 %  No intake/output data recorded.        LABS:  Recent Labs   Lab 02/05/23  0834 02/04/23  0608 02/03/23  0738   NA  --   --  140   POTASSIUM  --   --  4.1   CHLORIDE  --   --  104   CO2  --   --  26   ANIONGAP  --   --  10   GLC 89 97 118*   BUN  --   --  8.7   CR  --   --  0.74   NERY  --   --  8.6       Recent Labs   Lab 02/03/23  0738   WBC 10.2   RBC 3.99   HGB 12.2   HCT 37.3   MCV 94   MCH 30.6   MCHC 32.7   RDW 11.9          IMAGING:  Recent Results (from the past 24 hour(s))   CT Head w/o Contrast    Narrative    EXAM: CT HEAD W/O CONTRAST  2/4/2023 9:47 PM     HISTORY:  Headache; No applicable indication       COMPARISON:  Head CT 2/2/2023, Brain MRI 1/3/2023.    TECHNIQUE: Using multidetector thin collimation helical acquisition  technique, axial, coronal and sagittal CT images from the skull base  to the vertex were obtained without intravenous contrast.   (topogram) image(s) also obtained and reviewed.    FINDINGS:  Postsurgical changes of anterior craniofacial resection for sinonasal  adenocarcinoma. No intracranial hemorrhage, mass effect, or midline  shift. Near resolution of pneumocephalus with small focus of air in  the superior sagittal sinus. No acute loss of gray-white matter  differentiation in the cerebral hemispheres. Ventricles are  proportionate to the cerebral sulci. Clear basal cisterns.    Postsurgical changes about the left ethmoid air cells and cribriform  plate with scattered debris/gas within the resection cavity. Layering  fluid in the left greater than right maxillary sinuses. Pseudophakia  on the left with scleral band.       Impression    IMPRESSION:   1. No acute intracranial pathology.   2. Post surgical changes  from sinonasal mass removal. Decreased  postoperative pneumocephalus.    I have personally reviewed the examination and initial interpretation  and I agree with the findings.    KRIS RASMUSSEN MD         SYSTEM ID:  [id number]

## 2023-02-05 NOTE — PLAN OF CARE
Status: POD #2 s/p EEA resection of left sinonasal adenocarcinoma with L upper thigh graft.  Vitals: VSS   Neuros: A&Ox4. Strengths 5/5 throughout. L pupil irregular shape, baseline per pt from cataract surgery.   IV: PIV SL  Labs/Electrolytes: WNL  Resp/trach: WNL on RA. No reports of sob   Diet: regular diet - poor intake d/t continuous nausea. Pt having sips of water and cranberry juice. Scheduled zofran, reglan given this shift.   Bowel status: loose BM 2/4. BS audible. Bowel med's held.   : voiding spontaneously   Skin: Bilateral cheek skin tears. L thigh flap covered with primapre, CDI. L nostril with small serosang drainage, R nostril with small amounts of pink/clear tinged drainage this evening. Unable to catch sample d/t positional nausea this shift.   Pain: cold washcloths applied to forehead for mild headache  Activity: SBA, GB  Social: family at bedside this shift   Plan: continue to manage nausea, encourage PO intake, and follow POC. WOC consulted to see pt for bilateral cheek skin tears.   Updates this shift: head CT completed this shift.  would like a call with an update about head CT, will pass along to oncoming nurse. EKG completed this shift.

## 2023-02-05 NOTE — PLAN OF CARE
Vitals: VSS on RA  Neuros: A&Ox4. Strengths 5/5 throughout. L pupil irregular shape, baseline per pt from cataract surgery. HA present 1-2/10. C/o numbness to top front 3 teeth this afternoon-MD notified.  IV: PIV SL'd  Resp/trach: WNL on RA  Diet: Regular diet, ate 50 % of lunch and breakfast  Bowel status: BS+x4, loose BM this shift. Nausea improved this shift, compazine given x1.   : Voiding  Skin: Bilateral cheek skin tears. L thigh flap GERARD. Scant serosang drainage from nostril.  Pain: C/o HA- 1-2/10. Cold applied to forehead. Tylenol given.  Activity: Up independently, ambulated in hallway x2  Social:  and kids at bedside  Plan: Plan to discharge this afternoon.

## 2023-02-05 NOTE — PLAN OF CARE
Status: POD #3 s/p EEA resection of L sinonasal adenocarcinoma w/ L upper thigh graft.    Vitals: VSS on RA  Neuros: AOx4. Strength 5/5 throughout. L eye blurriness and irregular pupil - baseline. Denied salty/metallic taste.   IV: PIV SL.   Labs/Electrolytes: Head CT and EKG on evenings completed with no abnormal findings.   Resp/trach: LSC. Nausea managed with PRN compazine and scheduled reglan.   Diet: Regular diet. Poor intake d/t nausea.   Bowel status: LBM 2/4, loose.   : Voiding spontaneously.   Skin: L thigh flap covered with primapore, CDI. L and R nostril drainage.   Pain: HA managed with cold washcloths and PRN tylenol.   Activity: SBA + GB.   Plan: Manage nausea. Cont POC.  Updates this shift: Per pt, nausea and headache improving.

## 2023-02-05 NOTE — PROGRESS NOTES
Discharge time/date: 2/5/23 @ 1645  Walked or Wheelchair: Walked   PIV removed: yes  Reviewed AVS with patient: yes  Medication due times added to AVS in EPIC: yes  Verbalized understanding of discharge with teachback: yes  Medications retrieved from pharmacy: yes  Supplies sent home: nasal sling  Belongings from security with patient: N/A

## 2023-02-05 NOTE — PROGRESS NOTES
"Otolaryngology Progress Note  February 5, 2023    S: Patient had substantial vomiting / nausea yesterday and overnight that made eating and taking medications difficult. She was started on IV pain medication, IV nausea medications. A CT Head was done which was deemed to be unremarkable. Today patient continues to rate pain 3/10, denies salty or metallic taste in her mouth. Did have a BM yesterday.     O: /76 (BP Location: Right arm)   Pulse 74   Temp 98  F (36.7  C) (Oral)   Resp 16   Ht 1.727 m (5' 8\")   Wt 85.3 kg (188 lb 0.8 oz)   SpO2 96%   BMI 28.59 kg/m     General: Alert and oriented x 3, No acute distress  HEENT: EOMI. HB 1/6. Anterior nares dry with old blood, posterior oropharyngeal wall dry.  Pulmonary: Breathing non-labored, no stridor, no accessory muscle use.  Extremities: Left thigh donor site flat, primapore clean/dry/intact    LABS:  ROUTINE IP LABS (Last four results)  BMPRecent Labs   Lab 02/04/23  0608 02/03/23  0738 02/03/23  0650 02/02/23  2339   NA  --  140  --   --    POTASSIUM  --  4.1  --   --    CHLORIDE  --  104  --   --    NERY  --  8.6  --   --    CO2  --  26  --   --    BUN  --  8.7  --   --    CR  --  0.74  --   --    GLC 97 118* 117* 162*     CBC  Recent Labs   Lab 02/03/23  0738 02/02/23  1423   WBC 10.2  --    RBC 3.99  --    HGB 12.2 13.3   HCT 37.3  --    MCV 94  --    MCH 30.6  --    MCHC 32.7  --    RDW 11.9  --      --      INRNo lab results found in last 7 days.     A/P: Rosalind Murphy is a 52 year old female with a past medical history of sinonasal adenocarcinoma, intestinal type, now s/p anterior craniofacial resection with resection of dural margins and repair with right nasoseptal flap and fascia elsy 2/2/23. Her post-operative course has been complicated by significant nausea/vomiting.     Neuro:  - Pain control: As tolerated PO tylenol, oxycodone. IV toradol started    HEENT:  - Monitor for CSF leak  - Skullbase precautions: no positive pressure " ventilation or incentive spirometry, no blind instrumentation of the nose, sneeze with mouth open, avoid straining, coughing, and vomiting. Okay for nasal cannula from ENT standpoint.  - Scheduled Zofran x24 hours (patient may refuse)  - Nasal saline  - Okay for Afrin    Respiratory:  - supplemental O2 PRN to keep sats >92%     CV/heme:  - hemodynamically stable  - PTA propanolol  - discontinue PRN hydralazine/labetolol     FEN/GI:  - Regular diet  - Bowel regimen ordered  - Zofran (IV or PO), compazine (IV or PO), reglan (IV)     :  - voiding independently     Endo  - PTA synthroid  - ISS to maintain BG < 180     Heme/ID:  - YUSRA, perioperative antibiotics complete     PPX:  - SCDs  - No IS     Consults: Neurosurgery     Dispo: Improved PO intake       Mundo Lemus MD  Otolaryngology - Head and Neck Surgery PGY-2    -- Patient and plan discussed with staff Dr. Akash Vyas.

## 2023-02-06 NOTE — OP NOTE
Procedure Date: 02/02/2023    PREOPERATIVE DIAGNOSES:    1.  Intestinal type sinonasal adenocarcinoma.  2.  Nasal obstruction.    PREOPERATIVE DIAGNOSES:    1.  Intestinal type sinonasal adenocarcinoma.  2.  Nasal obstruction.    PROCEDURE PERFORMED:     1.  Endoscopic endonasal transcribriform resection of anterior cranial fossa intradural tumor, left sinonasal adenocarcinoma.  2.  [place] of right-sided pedicled nasoseptal flap pedicled off the posterior septal branch of sphenopalatine artery.    SURGEON:  Germain Vyas MD    CO-SURGEON:  Abhi Tidwell MD    RESIDENTS:     1.  Diane Brewer MD  2.  Oliver Michael MD    INDICATIONS FOR PROCEDURE:  Rosalind is a 52-year-old woman with several months of nasal obstruction and was found to have a left-sided biopsy proven adenocarcinoma.  She initially went to surgery with me approximately 2 weeks prior for tumor resection, but intraoperatively it was found that this tumor was spreading through the olfactory cleft cyst just adjacent to the cribriform directly on the cribriform plate.  Thus dural resection was indicated.  We brought her for her back for a second stage resection for negative margins, which would include a dural resection.  We discussed all the risks, benefits and alternatives to surgery at length.  She and her  were allowed to ask a number of insightful questions, which I did answer to the best of my ability.  After this discussion, written informed consent was obtained.    DESCRIPTION OF PROCEDURE IN DETAIL:  The patient was identified in the preoperative holding area.  Consent was confirmed.  She was subsequently brought back to the operating room where general anesthesia was induced and she was intubated without issue.  The eyes were protected.  Timeout was performed.  All were in agreement.  She was subsequently turned 180 degrees and placed in [place] pin fixation by the Neurosurgery Service.  She was subsequently prepped and  draped in standard fashion in preparation for endoscopic endonasal skull base surgery.  We decongested the bilateral nasal cavities with 1:100,000 Afrin-soaked pledgets.  We performed debridement on the left side, removing additional packing from the ethmoid cavity and the maxillary sinus as well as the frontal recess on the left side.  Once this was done, we had clear wide open view of the left sinonasal cavity.  We started with inspecting the left side sinonasal cavity.  The olfactory cleft did appear to demonstrate evidence of suspicious transformation and thus we determined the limits of our resection on the left sinonasal cavity.  We started by sending several margins to outline the perimeter of our resection site.  Anterior, inferior, posterior septal margins were sent and these all returned negative for tumor.  Laterally, we sent anterior, posterior ethmoid mucosal margins as well as lateral nasal wall margins.  These all returned negative for tumor.  Thus, we began with resecting the olfactory cleft tissue.  The septal mucosa of the olfactory cleft was elevated and  from the contralateral olfactory cleft septal mucosa, as well as cartilage using a caudal elevator as well as suction [place] elevator.  This was elevated all the way up to the cribriform plate and nasal surface of the sebastien carmela.  It was then lateralized away from the contralateral side.  The contralateral mucosa did appear normal in appearance.  It should also be noted that a contralateral septal olfactory cleft mucosa was sent for frozen section analysis was negative for tumor.  We discussed continued our dissection and removed olfactory cleft mucosa, which was suspicious for malignant transformation all the way superiorly towards the cribriform plate.  This was done using a combination of straight Thru-Cutting forceps as well as microdebrider.  Specimens were sent for permanent pathologic analysis.  Once we had resected olfactory  cleft mucosa superiorly towards the cribriform plate, we resected the middle and superior turbinate superiorly towards the cribriform plate in a similar fashion.  This was done using a combination of straight Thru-Cutting forceps as well as microdebrider.  Then this area was then bipolar cauterized using an [place]/LBR bipolar forceps.  Once this was done, we stripped the mucosa off the lamina papyracea as well as the ethmoid roof.  This was done using a combination of caudal elevator as well as [place]akes[place] forceps.  This was done from posteriorly towards the planum skull base as well as superiorly towards the posterior table of the frontal sinus.  Intersinus septations were drilled.  The anterior and posterior ethmoid arteries were identified and ligated using bipolar forceps.       After this was done, we then harvested a right-sided pedicled nasoseptal flap, which was pedicled off the posterior septal branch of sphenopalatine artery. Again, it should be mentioned contralateral septal mucosa was sent for frozen section analysis.  This was negative for tumor.  Thus, we felt it appropriate to use a right-sided nasoseptal flap for skull base reconstruction.  We delimited the pedicle of the nasoseptal flap using needle tip Bovie electrocautery.  This was started superiorly at the sphenoid ostium, was carried anteriorly 1 cm below the olfactory cleft, it was carried superiorly over the nasal septal body and it was carried inferiorly just posterior to the mucocutaneous junction.  The inferior incision was started at the pedicle just superior, at the choanal arch just superior to the eustachian tube and was carried anteriorly onto the vomer and then inferiorly it was carried out the nasal surface of the hard palate and was connected to the superior incision just posterior to the mucocutaneous junction.  A flap was painstakingly elevated off the floor of the nasal cavity.  The cartilage and bone of the septum, once it was  isolated at the pedicle, it was stored in the nasopharynx for later use.    We were then joined by our Neurosurgery colleagues.  Together, we performed additional drilling to better approach the intradural portion of the tumor, performed the cribriform plate resection as well as intradural olfactory bulb dissection.  This was indicated due to the tumor proclivity for spreading along the olfactory mucosa through the cribriform plate and towards the olfactory bulb.  We worked together with the Neurosurgery team. Olfactory mucosa was resected superiorly towards the cribriform plate.  The olfactory dura as well as anterior cranial fossa dura was exposed using a combination of high-speed drill as well as [place]ador elevator.  The dura was then resected starting laterally just anterior to where the anterior ethmoid artery had been ligated medially.  This was carried medially towards the sebastien carmela just inferior to the posterior table frontal sinus. The dura with the olfactory bulb within it was then carefully resected away from the frontal lobe using a combination of curved endoscopic scissors as well as feather blade. This was resected posteriorly until we encountered the olfactory bulb.  This was peeled down inferiorly into our surgical field using plate dissector.  It was carefully  from the arachnoid in this fashion.  Once we resected the dura posteriorly towards the planum skull base, transected the olfactory bulb posteriorly.  The specimen was placed on the back table and oriented. Several margins of the specimen were sent for frozen pathologic analysis.  The final posterior olfactory bulb margin was also sent for final pathologic analysis.  Once this was done, we proceeded with reconstruction. A DuraGen graft was fashioned and was placed in inlay fashion within the dural defect.  The fascia autograft was harvested on the left side of the thigh.  An incision was made and subcutaneous fat was dissected until  we encountered the fascia elsy.  An approximately 2 x 2 cm fascia elsy graft was harvested using sharp dissection.  The incision was closed in layers using a 3-0 Vicryl for deep and a 4-0 Monocryl in subcuticular fashion.  The fascia elsy was placed within the skull base defect in an epidural tuck fashion.  After this was done, we retrieved the nasoseptal flap from the nasopharynx and it was placed over the skull base defect such that all free edges made good contact with bone.  It was then lined with Surgicel and then further bolstered in place using DuraSeal, Gelfoam and NasoPore sponge.  We fashioned Costello splints and sutured them along the septum using 3-0 nylon suture.  An orogastric tube was placed in the stomach and the contents were suctioned.  The patient was subsequently turned over to the Neurosurgical Service performed who removal from [place] pin fixation.  The patient was subsequently turned over to Anesthesia for awakening.  She was extubated in an uneventful fashion and transferred to the PACU in stable condition.    Germain Vyas MD        D: 2023   T: 2023   MT: [id number]    Name:     VINITA GILLIS  MRN:      [id number]        Account:        [id number]   :      1970           Procedure Date: 2023     Document: [id number]

## 2023-02-06 NOTE — PLAN OF CARE
Occupational Therapy Discharge Summary    Reason for therapy discharge:    Discharged to home.    Progress towards therapy goal(s). See goals on Care Plan in [facility] electronic health record for goal details.  Goals partially met.  Barriers to achieving goals:   discharge from facility.    Therapy recommendation(s):    No further therapy is recommended.Pt would benefit from assist at home as needed to maintain precautions.

## 2023-02-07 ENCOUNTER — TELEPHONE (OUTPATIENT)
Dept: NEUROSURGERY | Facility: CLINIC | Age: 53
End: 2023-02-07
Payer: COMMERCIAL

## 2023-02-07 LAB
ATRIAL RATE - MUSE: 61 BPM
DIASTOLIC BLOOD PRESSURE - MUSE: NORMAL MMHG
INTERPRETATION ECG - MUSE: NORMAL
P AXIS - MUSE: 57 DEGREES
PR INTERVAL - MUSE: 130 MS
QRS DURATION - MUSE: 90 MS
QT - MUSE: 422 MS
QTC - MUSE: 424 MS
R AXIS - MUSE: 77 DEGREES
SYSTOLIC BLOOD PRESSURE - MUSE: NORMAL MMHG
T AXIS - MUSE: 42 DEGREES
VENTRICULAR RATE- MUSE: 61 BPM

## 2023-02-07 NOTE — TELEPHONE ENCOUNTER
Called patient for postop follow up. Left voicemail with direct callback number.    Nan Be, RN  RN Care Coordinator, Skull Base Surgery  [facility]  Direct: [phone]

## 2023-02-09 NOTE — TELEPHONE ENCOUNTER
Pt called wanting to discuss nasal drainage, wondering if some dripping is still normal. States she notices drainage more on right side, had previously been clear with tint of blood, becoming more clear now. Doesn't drip like a faucet, doesn't increase with leaning forward. Yesterday felt a little better, dabbing it less. Feels it's slowly improving since discharge. Headaches controlled with Tylenol about 3 times daily. Describes just a mild headache and pressure, didn't use saline spray yesterday.    Encouraged regular use of saline spray to help with pressure. Provided reassurance about nasal drainage, discussed s/s to watch for. Encouraged patient to call back with any worsening of symptoms. Pt scheduled for follow up with Dr. Vyas next week.     Nan Be, RN  RN Care Coordinator, Skull Base Surgery  [facility]  Direct: [phone]

## 2023-02-14 NOTE — PROGRESS NOTES
[facility]  Return Visit  Encounter date:   February 16, 2023    Chief Complaint:   First Post OP    ID: s/p second stage surgery as below    Interval History:   Rosalind Murphy is a 52 year old female who presents for follow up.  She underwent second stage endoscopic anterior craniofacial resection for dural margins 2 weeks ago.  She has been doing well after surgery, but frontal dull headaches have gotten a little worse since approximately 1 week ago  She has been managing with Tylenol every 6 hours, occasional Aleve, occasional migraine medications  No signs or symptoms of CSF leak    Sino-Nasal Outcome Test (SNOT - 22)   DNT    [place] [facility]  Procedure Date: 02/02/2023     PREOPERATIVE DIAGNOSES:    1.  Intestinal type sinonasal adenocarcinoma.  2.  Nasal obstruction.     PREOPERATIVE DIAGNOSES:    1.  Intestinal type sinonasal adenocarcinoma.  2.  Nasal obstruction.     PROCEDURE PERFORMED:     1.  Endoscopic endonasal transcribriform resection of anterior cranial fossa intradural tumor, left sinonasal adenocarcinoma.  2.  [place] of right-sided pedicled nasoseptal flap pedicled off the posterior septal branch of sphenopalatine artery.     SURGEON:  Germain Vyas MD     CO-SURGEON:  Abhi Tidwell MD    Review of systems: A 14-point review of systems has been conducted and is negative for any notable symptoms, except as dictated in the history of present illness.     Physical Exam:  Vital signs: There were no vitals taken for this visit.   General Appearance: No acute distress, appropriate demeanor, conversant  Eyes: moist conjunctivae; EOMI; pupils symmetric; visual acuity grossly intact; no proptosis  Head: normocephalic; overall symmetric appearance without deformity  Face: overall symmetric without deformity; HB I/VI  Nose: No external deformity; see endoscopy  Lungs: symmetric chest rise; no wheezing  CV: Good distal perfusion; normal hear rate  Extremities: No  deformity  Neurologic Exam: Cranial nerves II-XII are grossly intact; no focal deficit       Procedure Note  Procedure performed: Rigid nasal endoscopy with bilateral debridement  Indication: To evaluate for sinonasal pathology not visualized on routine anterior rhinoscopy; Continued and routine endoscopy with debridement is indicated post-operatively to evaluate for CSF leak and defend against post-operative surgical site infection and/or untoward healing.   Anesthesia: 4% topical lidocaine with 0.05 % oxymetazoline  Description of procedure: A 30 degree, 3 mm rigid endoscope was inserted into bilateral nasal cavities and the nasal valves, nasal cavity, middle meatus, sphenoethmoid recess, nasopharynx were evaluated for evidence of obstruction, edema, purulence, polyps and/or mass/lesion.     I then used a combination of non-cutting forceps, straight and curved suction to clear bilateral surgical cavities of packing, clot, crust, and fibrinopurulent debris.        Findings  The pedicle to the septal flap is visualized and is healthy in appearance.  Packing is cleaned to the left anterior skull base.  Residual packing is left at the site of reconstruction.  There is no evidence of CSF leak around the packing.  No evidence of sinusitis.    The patient tolerated the procedure well without complication.     Laboratory Review:  n/a     Imaging Review:  CT sinus 12/15/2022:  1.  The left nasal cavity is opacified along its mid to posterior   aspect with extension of soft tissue into the left posterior   nasopharynx. There is questionable remodeling of the posterolateral   wall of the left nasal cavity. Is an underlying lesion at this   location is possible and correlation with direct visualization is   advised.        MRI skull base w and w/o contrast 1/2/2023  IMPRESSION: 3.2 x 1.4 x 3.1 cm left posterior nasal cavity mass  compatible with pathology proven adenocarcinoma..     PET/CT 1/3/2023:  IMPRESSION: In this  patient with biopsy-proven left nasal cavity  adenocarcinoma:  1. No evidence of metastasis in the chest, abdomen or pelvis.  2. Please refer to dedicated report for findings in the head and neck  region.     Impression:   1. 2.8 x 2.7 x 1.4 cm hypermetabolic mass in the left posterior nasal  cavity compatible with pathology proven adenocarcinoma.  2. No cervical lymphadenopathy.   3. Please refer to the whole body PET CT performed as a separate  report, for the findings of the remainder of the body.        *I have personally reviewed these images and agree with the radiologist's interpretation*        Pathology Review:  Final Diagnosis 2/2/23  A. LEFT POSTERIOR ETHMOID, EXCISIONAL BIOPSY:  -Benign sinonasal mucosa with chronic inflammation and focal calcifications.  B. LEFT ANTERIOR ETHMOID, EXCISIONAL BIOPSY:  -Benign bone and sinonasal mucosa with chronic inflammation.  C. LEFT LATERAL NASAL WALL, BIOPSY:  -Benign sinonasal mucosa with chronic inflammation  D. RIGHT OLFACTORY CLEFT, BIOPSY:  -Benign sinonasal mucosa with chronic inflammation.  E. LEFT ANTERIOR SEPTAL MARGIN, EXCISION:  -Benign sinonasal mucosa with chronic inflammation.  F. LEFT INFERIOR SEPTAL MARGIN, EXCISION:  -Benign sinonasal mucosa with chronic inflammation  G. LEFT SPHENOID ROSTRUM, EXCISION:  -Benign sinonasal mucosa with chronic inflammation  H. LEFT OLFACTORY CLEFT, BIOPSY:  -Benign fibroconnective tissue and bone.  I. LEFT OLFACTORY CLEFT #2, BIOPSY:  -FOCI OF RESIDUAL INTESTINAL-TYPE ADENOCARCINOMA in fragments of fibroconnective and mucosal tissue with chronic  inflammation.  J. LEFT OLFACTORY #3, BIOPSY:  -Negative for malignancy.  K. POSTERIOR DURAL MARGIN, BIOPSY:  -Scant fragments of fibroconnective tissue, negative for malignancy.  L. ANTERIOR DURAL MARGIN, BIOPSY:  -Small fragment of fibroconnective and nerve tissue, negative for malignancy.  M. MEDIAL DURAL MARGIN, BIOPSY:  -Small fragment of dense fibrous tissue, negative for  malignancy.  N. LATERAL DURAL MARGIN, BIOPSY:  -Small fragment of dense fibrous tissue, negative for malignancy.  O. POSTERIOR NERVE OLFACTORY MARGIN, EXCISION:  -Small fragment of neural type tissue, negative for malignancy.      Final Diagnosis 1/17/23  A. NOSE, LEFT SINONASAL TUMOR, EXCISION:  - SINONASAL ADENOCARCINOMA, INTESTINAL TYPE  - See comment  B. NOSE, LEFT MIDDLE TURBINATE, EXCISION:  - Fragments of benign respiratory mucosa and lamellar bone  - No evidence of malignancy  C. NOSE, LEFT OLFACTORY CLEFT, EXCISION:  - SINONASAL ADENOCARCINOMA, INTESTINAL TYPE        Nasal mass biopsy - 12/23/2022  Final Diagnosis        NASAL MASS, BIOPSY:   1. Moderately-differentiated adenocarcinoma, favor     sinonasal adenocarcinoma, non-intestinal type         Assessment/Medical Decision Making:  Left olfactory cleft ITAC  S/p stage 1 surgery  S/p stage 2 surgery 2/2/23  Nasal obstruction secondary to above  Atypical facial pain        Plan:  Bilateral endoscopy with debridement today -very reassuring exam  Treat headaches as she sees fit  Proceed with radiation oncology visit.  We will plan for postoperative radiotherapy.  Return to clinic in 4 weeks.  She knows to reach out for any issues prior to the follow-up with me.    Germain Vyas MD    [facility]  Rhinology, Endoscopic Skull Base Surgery  [facility]  Department of Otolaryngology - Head & Neck Surgery    Scribe Disclosure:  I, Eduin Felix, am serving as a scribe to document services personally performed by Germain Vyas MD at this visit, based upon the provider's statements to me. All documentation has been reviewed by the aforementioned provider prior to being entered into the official medical record.     Additional portions of the patient's history have been reviewed below.    ~~~~~~~~~~~~~~~~~~~~~~~~~~~~~~~~~~~~~~~~~~~~~~~~~~~~~~~~~~~~~~~~~~~~~~~~~~~~~~~~~~~~~~~~~~~~~~~~~~~~~~~~~~~~~~~~~~~~~~~~~~~~~~~~~~~~~~~    Past Medical History:   Diagnosis Date     History of hysterectomy 03/06/2015     Hypothyroidism      Motion sickness      PONV (postoperative nausea and vomiting)      Primary adenocarcinoma of nasal cavity (H) 12/23/2022        Past Surgical History:   Procedure Laterality Date     CATARACT IOL, RT/LT Left     as a child     HYSTERECTOMY  2015     LAPAROTOMY EXPLORATORY  2015     OPTICAL TRACKING SYSTEM ENDOSCOPIC EXCISION SINUS TUMOR Left 1/17/2023    Procedure: EXCISION, NEOPLASM, PARANASAL SINUS, ENDOSCOPIC, USING OPTICAL TRACKING SYSTEM;  Surgeon: Germain Vyas MD;  Location: [facility] OR     OPTICAL TRACKING SYSTEM ENDOSCOPIC EXCISION SINUS TUMOR Bilateral 2/2/2023    Procedure: stealth assisted endoscopic endonasal anterior craniofacial resection, nasoseptal flap;  Surgeon: Germain Vyas MD;  Location: [facility] OR     [place] BIOPSY NASAL LESION  12/23/2022     PROCURE GRAFT FAT Left 2/2/2023    Procedure: fascia elsy graft, left upper thigh;  Surgeon: Germain Vyas MD;  Location: [facility] OR     RETINAL DETACHMENT SURGERY Left 2019     TONSILLECTOMY      age 6        Family History   Problem Relation Age of Onset     Breast Cancer Mother      Breast Cancer Maternal Aunt      Breast Cancer Maternal Aunt      Lung Cancer Maternal Aunt      Bladder Cancer Maternal Aunt      Cancer Maternal Uncle         Eye        Social History     Socioeconomic History     Marital status:    Tobacco Use     Smoking status: Never     Smokeless tobacco: Never   Substance and Sexual Activity     Alcohol use: Yes     Comment: social use per patient     Drug use: Never

## 2023-02-16 ENCOUNTER — TELEPHONE (OUTPATIENT)
Dept: OTOLARYNGOLOGY | Facility: CLINIC | Age: 53
End: 2023-02-16

## 2023-02-16 ENCOUNTER — MYC MEDICAL ADVICE (OUTPATIENT)
Dept: OTOLARYNGOLOGY | Facility: CLINIC | Age: 53
End: 2023-02-16

## 2023-02-16 ENCOUNTER — OFFICE VISIT (OUTPATIENT)
Dept: OTOLARYNGOLOGY | Facility: CLINIC | Age: 53
End: 2023-02-16
Payer: COMMERCIAL

## 2023-02-16 ENCOUNTER — ANCILLARY PROCEDURE (OUTPATIENT)
Dept: CT IMAGING | Facility: CLINIC | Age: 53
End: 2023-02-16
Attending: OTOLARYNGOLOGY
Payer: COMMERCIAL

## 2023-02-16 VITALS
OXYGEN SATURATION: 100 % | WEIGHT: 188 LBS | HEIGHT: 68 IN | SYSTOLIC BLOOD PRESSURE: 110 MMHG | BODY MASS INDEX: 28.49 KG/M2 | HEART RATE: 75 BPM | TEMPERATURE: 98.6 F | DIASTOLIC BLOOD PRESSURE: 75 MMHG

## 2023-02-16 DIAGNOSIS — J01.01 ACUTE RECURRENT MAXILLARY SINUSITIS: ICD-10-CM

## 2023-02-16 DIAGNOSIS — C79.31 SINUS CANCER WITH INTRACRANIAL EXTENSION (H): Primary | ICD-10-CM

## 2023-02-16 DIAGNOSIS — C79.31 SINUS CANCER WITH INTRACRANIAL EXTENSION (H): ICD-10-CM

## 2023-02-16 DIAGNOSIS — C31.9 SINUS CANCER WITH INTRACRANIAL EXTENSION (H): Primary | ICD-10-CM

## 2023-02-16 DIAGNOSIS — C80.1 ADENOCARCINOMA (H): ICD-10-CM

## 2023-02-16 DIAGNOSIS — C31.9 SINUS CANCER WITH INTRACRANIAL EXTENSION (H): ICD-10-CM

## 2023-02-16 PROCEDURE — 70450 CT HEAD/BRAIN W/O DYE: CPT | Mod: GC | Performed by: RADIOLOGY

## 2023-02-16 PROCEDURE — 99207 PR CDG-PROCEDURE CHARGE ONLY: CPT | Performed by: OTOLARYNGOLOGY

## 2023-02-16 PROCEDURE — 31237 NSL/SINS NDSC SURG BX POLYPC: CPT | Mod: 50 | Performed by: OTOLARYNGOLOGY

## 2023-02-16 RX ORDER — PREDNISONE 20 MG/1
40 TABLET ORAL DAILY
Qty: 10 TABLET | Refills: 0 | Status: SHIPPED | OUTPATIENT
Start: 2023-02-16 | End: 2023-02-21

## 2023-02-16 ASSESSMENT — PAIN SCALES - GENERAL: PAINLEVEL: MODERATE PAIN (5)

## 2023-02-16 NOTE — TELEPHONE ENCOUNTER
I spoke with Rosalind.  She is having worsening headaches after her visit with me today.  I would like to obtain a CT to rule out pneumocephalus.  If this is clear, then we will arrange for antibiotics and steroids.  She was appreciative of the call.  She knows to head to the ER if her symptoms become more severe prior to her scheduled CT.    Germain Vyas MD    [facility]  Center for Skull Base and Pituitary Surgery  [facility]  Department of Otolaryngology - Head & Neck Surgery

## 2023-02-16 NOTE — PATIENT INSTRUCTIONS
Continue sprays for 1 week, then start rinses  Keep your appointment with radiation oncology  Continue to treat headaches as you see fit - advil/naproxen are ok at this stage. Let us know if these get worse  Follow up with Dr. Vyas in 4 weeks    Germain Vyas MD    [facility]  Center for Skull Base and Pituitary Surgery  [facility]  Department of Otolaryngology - Head & Neck Surgery

## 2023-02-16 NOTE — TELEPHONE ENCOUNTER
Patient and her  called in addition to sending [employer] message below. Patient reporting her headache was 5/10 earlier this morning, but now is 6 or 7 out of 10 and persistent despite medications. Pain located at front of head, forehead, and temples.     She has taken Tylenol q6h and tried oxycodone at about 2:00 pm today, with no relief. She has tried both ibuprofen and Aleve in the past couple of days. She hasn't noticed much difference between Aleve or ibuprofen. We discussed alternating Tylenol and ibuprofen (which she can take more frequently than Aleve), being mindful of max doses of each in 24 hours. Since she was evaluated in clinic today with reassuring exam, encouraged patient to continue monitoring but call if worsened headache continues beyond today. She is wondering if Dr. Vyas has any additional recommendations for medications to help with pain.

## 2023-02-16 NOTE — LETTER
2/16/2023       RE: Rosalind Murphy  [address] [facility] [address]     Dear Colleague,    Thank you for referring your patient, Rosalind Murphy, to the [facility] [place] [facility]. Please see a copy of my visit note below.      [facility]  Return Visit  Encounter date:   February 16, 2023    Chief Complaint:   First Post OP    ID: s/p second stage surgery as below    Interval History:   Rosalind Murphy is a 52 year old female who presents for follow up.  She underwent second stage endoscopic anterior craniofacial resection for dural margins 2 weeks ago.  She has been doing well after surgery, but frontal dull headaches have gotten a little worse since approximately 1 week ago  She has been managing with Tylenol every 6 hours, occasional Aleve, occasional migraine medications  No signs or symptoms of CSF leak    Sino-Nasal Outcome Test (SNOT - 22)   DNT    [place] [facility]  Procedure Date: 02/02/2023     PREOPERATIVE DIAGNOSES:    1.  Intestinal type sinonasal adenocarcinoma.  2.  Nasal obstruction.     PREOPERATIVE DIAGNOSES:    1.  Intestinal type sinonasal adenocarcinoma.  2.  Nasal obstruction.     PROCEDURE PERFORMED:     1.  Endoscopic endonasal transcribriform resection of anterior cranial fossa intradural tumor, left sinonasal adenocarcinoma.  2.  Romeo of right-sided pedicled nasoseptal flap pedicled off the posterior septal branch of sphenopalatine artery.     SURGEON:  Germain Vyas MD     CO-SURGEON:  Abhi Tidwell MD    Review of systems: A 14-point review of systems has been conducted and is negative for any notable symptoms, except as dictated in the history of present illness.     Physical Exam:  Vital signs: There were no vitals taken for this visit.   General Appearance: No acute distress, appropriate demeanor, conversant  Eyes: moist conjunctivae; EOMI; pupils symmetric;  visual acuity grossly intact; no proptosis  Head: normocephalic; overall symmetric appearance without deformity  Face: overall symmetric without deformity; HB I/VI  Nose: No external deformity; see endoscopy  Lungs: symmetric chest rise; no wheezing  CV: Good distal perfusion; normal hear rate  Extremities: No deformity  Neurologic Exam: Cranial nerves II-XII are grossly intact; no focal deficit       Procedure Note  Procedure performed: Rigid nasal endoscopy with bilateral debridement  Indication: To evaluate for sinonasal pathology not visualized on routine anterior rhinoscopy; Continued and routine endoscopy with debridement is indicated post-operatively to evaluate for CSF leak and defend against post-operative surgical site infection and/or untoward healing.   Anesthesia: 4% topical lidocaine with 0.05 % oxymetazoline  Description of procedure: A 30 degree, 3 mm rigid endoscope was inserted into bilateral nasal cavities and the nasal valves, nasal cavity, middle meatus, sphenoethmoid recess, nasopharynx were evaluated for evidence of obstruction, edema, purulence, polyps and/or mass/lesion.     I then used a combination of non-cutting forceps, straight and curved suction to clear bilateral surgical cavities of packing, clot, crust, and fibrinopurulent debris.        Findings  The pedicle to the septal flap is visualized and is healthy in appearance.  Packing is cleaned to the left anterior skull base.  Residual packing is left at the site of reconstruction.  There is no evidence of CSF leak around the packing.  No evidence of sinusitis.    The patient tolerated the procedure well without complication.     Laboratory Review:  n/a     Imaging Review:  CT sinus 12/15/2022:  1.  The left nasal cavity is opacified along its mid to posterior   aspect with extension of soft tissue into the left posterior   nasopharynx. There is questionable remodeling of the posterolateral   wall of the left nasal cavity. Is an  underlying lesion at this   location is possible and correlation with direct visualization is   advised.        MRI skull base w and w/o contrast 1/2/2023  IMPRESSION: 3.2 x 1.4 x 3.1 cm left posterior nasal cavity mass  compatible with pathology proven adenocarcinoma..     PET/CT 1/3/2023:  IMPRESSION: In this patient with biopsy-proven left nasal cavity  adenocarcinoma:  1. No evidence of metastasis in the chest, abdomen or pelvis.  2. Please refer to dedicated report for findings in the head and neck  region.     Impression:   1. 2.8 x 2.7 x 1.4 cm hypermetabolic mass in the left posterior nasal  cavity compatible with pathology proven adenocarcinoma.  2. No cervical lymphadenopathy.   3. Please refer to the whole body PET CT performed as a separate  report, for the findings of the remainder of the body.        *I have personally reviewed these images and agree with the radiologist's interpretation*        Pathology Review:  Final Diagnosis 2/2/23  A. LEFT POSTERIOR ETHMOID, EXCISIONAL BIOPSY:  -Benign sinonasal mucosa with chronic inflammation and focal calcifications.  B. LEFT ANTERIOR ETHMOID, EXCISIONAL BIOPSY:  -Benign bone and sinonasal mucosa with chronic inflammation.  C. LEFT LATERAL NASAL WALL, BIOPSY:  -Benign sinonasal mucosa with chronic inflammation  D. RIGHT OLFACTORY CLEFT, BIOPSY:  -Benign sinonasal mucosa with chronic inflammation.  E. LEFT ANTERIOR SEPTAL MARGIN, EXCISION:  -Benign sinonasal mucosa with chronic inflammation.  F. LEFT INFERIOR SEPTAL MARGIN, EXCISION:  -Benign sinonasal mucosa with chronic inflammation  G. LEFT SPHENOID ROSTRUM, EXCISION:  -Benign sinonasal mucosa with chronic inflammation  H. LEFT OLFACTORY CLEFT, BIOPSY:  -Benign fibroconnective tissue and bone.  I. LEFT OLFACTORY CLEFT #2, BIOPSY:  -FOCI OF RESIDUAL INTESTINAL-TYPE ADENOCARCINOMA in fragments of fibroconnective and mucosal tissue with chronic  inflammation.  J. LEFT OLFACTORY #3, BIOPSY:  -Negative for  malignancy.  K. POSTERIOR DURAL MARGIN, BIOPSY:  -Scant fragments of fibroconnective tissue, negative for malignancy.  L. ANTERIOR DURAL MARGIN, BIOPSY:  -Small fragment of fibroconnective and nerve tissue, negative for malignancy.  M. MEDIAL DURAL MARGIN, BIOPSY:  -Small fragment of dense fibrous tissue, negative for malignancy.  N. LATERAL DURAL MARGIN, BIOPSY:  -Small fragment of dense fibrous tissue, negative for malignancy.  O. POSTERIOR NERVE OLFACTORY MARGIN, EXCISION:  -Small fragment of neural type tissue, negative for malignancy.      Final Diagnosis 1/17/23  A. NOSE, LEFT SINONASAL TUMOR, EXCISION:  - SINONASAL ADENOCARCINOMA, INTESTINAL TYPE  - See comment  B. NOSE, LEFT MIDDLE TURBINATE, EXCISION:  - Fragments of benign respiratory mucosa and lamellar bone  - No evidence of malignancy  C. NOSE, LEFT OLFACTORY CLEFT, EXCISION:  - SINONASAL ADENOCARCINOMA, INTESTINAL TYPE        Nasal mass biopsy - 12/23/2022  Final Diagnosis        NASAL MASS, BIOPSY:   1. Moderately-differentiated adenocarcinoma, favor     sinonasal adenocarcinoma, non-intestinal type         Assessment/Medical Decision Making:  Left olfactory cleft ITAC  S/p stage 1 surgery  S/p stage 2 surgery 2/2/23  Nasal obstruction secondary to above  Atypical facial pain        Plan:  Bilateral endoscopy with debridement today -very reassuring exam  Treat headaches as she sees fit  Proceed with radiation oncology visit.  We will plan for postoperative radiotherapy.  Return to clinic in 4 weeks.  She knows to reach out for any issues prior to the follow-up with me.    Germain Vyas MD    [facility]  Rhinology, Endoscopic Skull Base Surgery  [facility]  Department of Otolaryngology - Head & Neck Surgery    Scribe Disclosure:  I, Eduin Felix, am serving as a scribe to document services personally performed by Germain Vyas MD at this visit, based upon the provider's statements to me. All  documentation has been reviewed by the aforementioned provider prior to being entered into the official medical record.     Additional portions of the patient's history have been reviewed below.   ~~~~~~~~~~~~~~~~~~~~~~~~~~~~~~~~~~~~~~~~~~~~~~~~~~~~~~~~~~~~~~~~~~~~~~~~~~~~~~~~~~~~~~~~~~~~~~~~~~~~~~~~~~~~~~~~~~~~~~~~~~~~~~~~~~~~~~~    Past Medical History:   Diagnosis Date     History of hysterectomy 03/06/2015     Hypothyroidism      Motion sickness      PONV (postoperative nausea and vomiting)      Primary adenocarcinoma of nasal cavity (H) 12/23/2022        Past Surgical History:   Procedure Laterality Date     CATARACT IOL, RT/LT Left     as a child     HYSTERECTOMY  2015     LAPAROTOMY EXPLORATORY  2015     OPTICAL TRACKING SYSTEM ENDOSCOPIC EXCISION SINUS TUMOR Left 1/17/2023    Procedure: EXCISION, NEOPLASM, PARANASAL SINUS, ENDOSCOPIC, USING OPTICAL TRACKING SYSTEM;  Surgeon: Germain Vyas MD;  Location: [facility] OR     OPTICAL TRACKING SYSTEM ENDOSCOPIC EXCISION SINUS TUMOR Bilateral 2/2/2023    Procedure: stealth assisted endoscopic endonasal anterior craniofacial resection, nasoseptal flap;  Surgeon: Germain Vyas MD;  Location: [facility] OR     NV BIOPSY NASAL LESION  12/23/2022     PROCURE GRAFT FAT Left 2/2/2023    Procedure: fascia elsy graft, left upper thigh;  Surgeon: Germain Vyas MD;  Location: [facility] OR     RETINAL DETACHMENT SURGERY Left 2019     TONSILLECTOMY      age 6        Family History   Problem Relation Age of Onset     Breast Cancer Mother      Breast Cancer Maternal Aunt      Breast Cancer Maternal Aunt      Lung Cancer Maternal Aunt      Bladder Cancer Maternal Aunt      Cancer Maternal Uncle         Eye        Social History     Socioeconomic History     Marital status:    Tobacco Use     Smoking status: Never     Smokeless tobacco: Never   Substance and Sexual Activity     Alcohol use: Yes     Comment: social use per patient     Drug use: Never           Again, thank you for  allowing me to participate in the care of your patient.      Sincerely,    Germain Vyas MD

## 2023-02-16 NOTE — TELEPHONE ENCOUNTER
Dr. Vyas called patient to discuss. CT head ordered. Called patient to notify of 6:30 pm CT tonight. She verbalized understanding.

## 2023-02-17 NOTE — TELEPHONE ENCOUNTER
I called Rosalind after I had reviewed CT. Overall, very reassuring. I advised continuing HA control with PO meds, alternating tylenol/advil with occasional migraine meds and oxycodone as needed. We also discussed starting irrigations tomorrow to facilitate packing breakdown and removal. I also sent an rx for abx and steroids if pain does not improve over the next 1-2 days. She knows to reach out for worsening symptoms.    Germain Vyas MD    [facility]  Center for Skull Base and Pituitary Surgery  [facility]  Department of Otolaryngology - Head & Neck Surgery

## 2023-02-28 ENCOUNTER — OFFICE VISIT (OUTPATIENT)
Dept: RADIATION ONCOLOGY | Facility: CLINIC | Age: 53
End: 2023-02-28
Payer: COMMERCIAL

## 2023-02-28 VITALS
SYSTOLIC BLOOD PRESSURE: 132 MMHG | HEART RATE: 76 BPM | DIASTOLIC BLOOD PRESSURE: 78 MMHG | TEMPERATURE: 98.1 F | OXYGEN SATURATION: 97 % | BODY MASS INDEX: 28.52 KG/M2 | RESPIRATION RATE: 18 BRPM | WEIGHT: 187.6 LBS

## 2023-02-28 DIAGNOSIS — C30.0 PRIMARY ADENOCARCINOMA OF NASAL CAVITY (H): Primary | ICD-10-CM

## 2023-02-28 PROCEDURE — 99214 OFFICE O/P EST MOD 30 MIN: CPT | Performed by: SURGERY

## 2023-02-28 RX ORDER — IBUPROFEN 200 MG
200 TABLET ORAL EVERY 4 HOURS PRN
COMMUNITY
End: 2024-08-14

## 2023-02-28 ASSESSMENT — PAIN SCALES - GENERAL: PAINLEVEL: NO PAIN (1)

## 2023-02-28 NOTE — NURSING NOTE
RADIATION ONCOLOGY HEAD & NECK FOLLOW-UP VISIT    Patient Name: Rosalind Murphy      : 1970     Age: 53 year old        ______________________________________________________________________________       Chief Complaint   Patient presents with     Cancer     Radiation oncology return consultation with Dr. Jules Pereira + CT Sim     /78 (BP Location: Left arm, Patient Position: Chair, Cuff Size: Adult Regular)   Pulse 76   Temp 98.1  F (36.7  C) (Oral)   Resp 18   Wt 85.1 kg (187 lb 9.6 oz)   SpO2 97%   BMI 28.52 kg/m           Pain:  Current history of pain associated with this visit:   Intensity: 1/10  Current: pressure  Location: frontal sinus and intermittent frontal headaches  Treatment: patient reports taking Tylenol po and Ibuprofen po as needed    Labs:  Other Labs: No    Imaging:  None    Fatigue:   Grade 1: Fatigue relieved by rest    Skin:  No Concerns    Dental:   Most Recent Dental Visit: patient seen by dental clinic [facility] with Dr. Alyssa Trinidad on 2023 for radiation clearance exam, letter received.      Speech/Swallowing:   Swallowing Restrictions: No difficulties with swallowing        Nutrition:  Oral Intake: patient preference  Weight:   Wt Readings from Last 3 Encounters:   23 85.1 kg (187 lb 9.6 oz)   23 85.3 kg (188 lb)   23 85.3 kg (188 lb 0.8 oz)           Future Appointments:    Dr. Vyas Appointment Date: 3/16/2023    Appointment Date:     Appointment Date:          Nurse face-to-face time: Level 4:  15 min face to face time.    Darline Villa, RN BSN OCN [facility]N

## 2023-02-28 NOTE — PATIENT INSTRUCTIONS
What to expect at your Simulation visit:    You will meet with a Radiation Therapist and other team members who will be doing a planning session called a  simulation  with you. This process will determine your daily treatment.    ~ You will lie on a flat table and have a treatment planning CT scan.  It is important during the scan to hold very still and breathe normally.    ~ Your therapist may construct a body mold to help you hold still for your treatments.    ~ If you are having treatment to the head or neck area you will be fitted with a plastic mesh mask that fits very snugly over your face and neck.     ~ Your therapist will be taking some digital photos that will go in your treatment chart.      ~Your therapist will make marks on your skin and take measurements. Your therapist may ask you about making small tattoos (a permanent small dot) over these marks.  These marks are used to position you daily for your radiation therapy treatments. Please do not wash off any marks until all of your radiation therapy treatments are complete unless you are instructed to do so by your therapist.    ~ Once the simulation is completed it can take from 3 to 10 business days before you start radiation therapy treatments.    ~ You may meet with a nurse who will go over management of treatment side effects and self care during your treatments. The nurse will help to plan care with other departments and physicians if needed.       Please contact [facility] RN with questions or concerns following today's appointment: [phone].    Thank you!

## 2023-02-28 NOTE — LETTER
2023         RE: Rosalind Murphy  [address] [facility] [address]        Dear Colleague,    Thank you for referring your patient, Rosalind Murphy, to the [facility] RADIATION [facility]COL[facility] [place]. Please see a copy of my visit note below.    .       Department of Radiation Oncology  [facility]: Cancer Center  [facility]  [address] [address] [place] [address] [phone]       Consultation Note    Name: Rosalind Murphy MRN: [id number]   : 1970   Date of Service: 2023  Referring: Dr. Vyas     Reason for consultation: left sinonasal adenocarcinoma     History of Present Illness     Ms. Murphy is a 52 year old female presenting with a newly diagnosed left sinonasal adenocarcinoma.    Briefly, her oncologic history is as follows:    Patient describes a 4-month history of left-sided nasal obstruction associated with mucus, prompting further evaluation.    She eventually was seen by an otolaryngologist Dr. Herbert Crawley for left sided nasal obstruction.  At that time she did notice drainage and foul smell, and it initially tried Sudafed, antibiotics Flonase and antihistamines without any relief.    CT scan was performed on 12/15/2022, demonstrating a left nasal cavity opacification from mid to posterior with extension of the soft tissue mass into the left posterior nasopharynx lumen.    She subsequently underwent biopsy on 2022 of the left nasal cavity mass, with pathology returning as a moderately differentiated adenocarcinoma not intestinal type.    PET scan was performed on 1/3/2023 which not demonstrate any evidence of any cervical adenopathy nor any distant metastatic disease.  There is demonstration of a 2.7 cm hypermetabolic mass in the left posterior nasal cavity compatible with known adenocarcinoma.    MRI was performed on 1/3/2023 demonstrating a 3.2 cm left posterior nasal cavity mass.  In general there was a T1  hypointense T2 intermediate signal measuring 3.2 centimeters in greatest dimension on the left posterior nasal cavity which extended to the nasopharyngeal lumen.  There was evidence of left ethmoid air cell mucosal thickening and possible left sphenoid inflammatory mucosal thickening.  There was no definite extension however, into the maxillary sinus, or into the ethmoid air cells or extension into the orbit, or palate.  There is no evidence of intracranial extension.  The right side was completely normal.    She was eventually saw Dr. Vyas at [facility] on 1/4/2023.  At that time, rigid nasal endoscopy was performed which demonstrated a left-sided nasal cavity mass which appears to be pedicled posteriorly in the nasal cavity.     She states that still has left-sided nasal obstruction with complete lack of airflow, and also has intermittent left facial pressure in the supraorbital location.  However she denies any epistaxis, and her facial pain.  She does endorse a 20/200 vision in the left eye which she has had since childhood but denies any new ocular changes of vision loss, double vision,/blurry vision.  She denies any loss of smell/taste, clear fluid, or any lumps or bumps in the neck.Denies any occupational exposures and any smoking history.  She denies any prior radiation.      Her case was discussed at head neck tumor board with recommendation for surgical excision of nasal cavity mass with final adjuvant treatment depending upon surgical pathology.     Interval history:    Patient was seen preoperatively and now presents after surgery.  On 1/17/2023 she underwent left endoscopic partial resection of sinonasal adenocarcinoma, frontal sinusotomy, total ethmoidectomy, maxillary antrostomy and sphenoidectomy. During the procedure it was noted that the tumor was in the posterior nasal cavity and was found to be pedicled in the nasal septum.  It was noted that the tumor involved the olfactory cleft  adjacent to the cribriform plate and thus decision was made to perform a biopsy at this location, which returned positive.  Thus the procedure was aborted with plan for reresection to get a clear dural margin.    She underwent staged reresection with a nasoseptal flap reconstruction on on 2/2/2023 given positive all laboratory cleft/cribriform plate involvement.  There was evidence of foci of residual adenocarcinoma within the olfactory cleft, but all negatives including the dural margins were negative.    Postoperatively she has had intermittent headaches and nausea and bloody drainage without any evidence of a CSF leak.  She was evaluated by Dr. Vyas on 2/16/2023 without any evidence of CSF leak and a healthy septal flap without any evidence of infection or recurrence.  There was however some residual packing left at the initial site of reconstruction.    A CT scan was prompted due to persistent headaches postoperatively which was obtained on 2/16/2023 which demonstrated postsurgical changes.    Today she presents for consideration of adjuvant radiotherapy.  Overall she states that she still has headaches but they are more milder in nature and she is is not currently taking any steroids at this time.  She denies any clear salty taste, but still has intermittent epistaxis.  She does not demonstrate any vision changes although she does have a history of 2/200 vision in the left eye since childhood.  She denies any prior radiation.    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/23/2022       Past Surgical History:   Past Surgical History:   Procedure Laterality Date     CATARACT IOL, RT/LT Left     as a child     HYSTERECTOMY  2015     LAPAROTOMY EXPLORATORY  2015     OPTICAL TRACKING SYSTEM ENDOSCOPIC EXCISION SINUS TUMOR  Left 1/17/2023    Procedure: EXCISION, NEOPLASM, PARANASAL SINUS, ENDOSCOPIC, USING OPTICAL TRACKING SYSTEM;  Surgeon: Germain Vyas MD;  Location: [facility] OR     OPTICAL TRACKING SYSTEM ENDOSCOPIC EXCISION SINUS TUMOR Bilateral 2/2/2023    Procedure: stealth assisted endoscopic endonasal anterior craniofacial resection, nasoseptal flap;  Surgeon: Germain Vyas MD;  Location: [facility] OR     [place] BIOPSY NASAL LESION  12/23/2022     PROCURE GRAFT FAT Left 2/2/2023    Procedure: fascia elsy graft, left upper thigh;  Surgeon: Germain Vyas MD;  Location: [facility] OR     RETINAL DETACHMENT SURGERY Left 2019     TONSILLECTOMY      age 6       Chemotherapy History:  No prior chemotherapy    Radiation History:  No prior radiation    Pregnant: No, would obtain bHCG prior to CT simultion  Implanted Cardiac Devices: No    Medications:  Current Outpatient Medications   Medication     acetaminophen (TYLENOL) 325 MG tablet     Ascorbic Acid (VITAMIN C) 500 MG CAPS     azelaic acid (FINACIA) 15 % external gel     diclofenac (VOLTAREN) 1 % topical gel     ibuprofen (ADVIL/MOTRIN) 200 MG tablet     levocetirizine (XYZAL) 5 MG tablet     levothyroxine (SYNTHROID/LEVOTHROID) 75 MCG tablet     propranolol (INDERAL) 20 MG tablet     sodium chloride (OCEAN) 0.65 % nasal spray     tretinoin (RETIN-A) 0.05 % external cream     amoxicillin-clavulanate (AUGMENTIN) 875-125 MG tablet     ondansetron (ZOFRAN ODT) 4 MG ODT tab     rizatriptan (MAXALT) 10 MG tablet     No current facility-administered medications for this visit.       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 Pertinent findings are noted in the HPI.    Physical Exam   ECOG Status: 0    Vitals:  /78 (BP Location: Left arm, Patient Position: Chair, Cuff Size: Adult Regular)   Pulse 76   Temp 98.1  F (36.7  C) (Oral)   Resp 18   Wt 85.1 kg (187 lb 9.6 oz)   SpO2 97%   BMI 28.52 kg/m      Gen: Alert, in NAD  Head: NC/AT  Eyes: PERRL, EOMI, sclera anicteric  Ears: No external auricular lesions  Nose/sinus: No rhinorrhea or epistaxis  Oral cavity/oropharynx: MMM, no visible oral cavity lesions  Neck: Full ROM, supple, no palpable adenopathy  Pulm: No wheezing, stridor or respiratory distress  CV: Extremities are warm and well-perfused, no cyanosis, no pedal edema  Abdominal: Normal bowel sounds, soft, nontender, no masses  Musculoskeletal: Normal bulk and tone  Skin: Normal color and turgor  Neuro: A/Ox3, CN II-XII intact, normal gait    Imaging/Path/Labs   Imaging: Per HPI, reviewed and in agreement.  On personal review it appears that the left-sided nasal cavity mass appears to be pedicled and in the left posterior septum and is filling the entire nasal cavity proper without any evidence of bony erosion, with no discrete nasopharyngeal mucosal involvement, there is no evidence of any cranial extension, no evidence of cervical lymphadenopathy or distant disease    Path: Per HPI, reviewed and in agreement    Labs: Per HPI reviewed and in agreement    Assessment      Ms. Murphy is a 52 year old female presenting with a newly diagnosed left nasal cavity moderately differentiated adenocarcinoma status post resection, with pathology demonstrating a posterior nasal septal mass with extension into the left  olfactory groove adjacent to the cribriform plate, with negative margins, most consistent with pT3cN0.     We discussed the management of sinonasal cancers. Per NCCN guidelines, general treatment paradigm for T1/T2 tumors involves surgical resection (preferred) followed by adjuvant therapy as indicated. In general, T1 tumors (without adverse features and favorable histology) can be observed. Otherwise, if T2 or higher and with adverse features, adjuvant treatment includes RT +/- chemotherapy) In patient unfit for surgery or unresectable disease, definitive RT (T1/T2) or concurrent chemoRT (T3/T4a) can be considered.    In this particular instance, she has underwent surgical resection and was noted to have a locally advanced operatively differentiated adenocarcinoma of the nasal cavity with extension to the left olfactory grove adjacent to the cribriform plate with negative margins, pT3cN0. Thus, recommendation is to proceed with adjuvant radiotherapy for improved local control.    Plan     1. After extensive discussion, patient wishes to proceed with adjuvant radiation to improve local control for her locally advanced sinonasal carcinoma.     2. Recommendation is for 60Gy/30fx without chemotherapy. We did discuss the possibility of elective david irradiation, particularly given location of tumor and stage.      3. She still has persistent packing in place near the reconstruction site. This was discussed directly with Dr. Vyas, and he will see patient next week to remove the packing and we can proceed with CT simulation and radiation planning at that point.    All benefits and risks discussed, and patient is in agreement with the oncologic plan discussed above.     Thank you for allowing me to participate in your patient's care.  If you should require any additional information, please do not hesitate in contacting me.     Jules Pereira MD  Department of Radiation Oncology  [facility]       Again, thank  you for allowing me to participate in the care of your patient.        Sincerely,        Jules Pereira MD

## 2023-03-01 NOTE — PROGRESS NOTES
Department of Radiation Oncology  [facility]: Cancer Center  [facility]  [address] [place] [id number]  [phone]       Consultation Note    Name: Rosalind Murphy MRN: [id number]   : 1970   Date of Service: 2023  Referring: Dr. Vyas     Reason for consultation: left sinonasal adenocarcinoma     History of Present Illness     Ms. Murphy is a 52 year old female presenting with a newly diagnosed left sinonasal adenocarcinoma.    Briefly, her oncologic history is as follows:    Patient describes a 4-month history of left-sided nasal obstruction associated with mucus, prompting further evaluation.    She eventually was seen by an otolaryngologist Dr. Herbert Crawley for left sided nasal obstruction.  At that time she did notice drainage and foul smell, and it initially tried Sudafed, antibiotics Flonase and antihistamines without any relief.    CT scan was performed on 12/15/2022, demonstrating a left nasal cavity opacification from mid to posterior with extension of the soft tissue mass into the left posterior nasopharynx lumen.    She subsequently underwent biopsy on 2022 of the left nasal cavity mass, with pathology returning as a moderately differentiated adenocarcinoma not intestinal type.    PET scan was performed on 1/3/2023 which not demonstrate any evidence of any cervical adenopathy nor any distant metastatic disease.  There is demonstration of a 2.7 cm hypermetabolic mass in the left posterior nasal cavity compatible with known adenocarcinoma.    MRI was performed on 1/3/2023 demonstrating a 3.2 cm left posterior nasal cavity mass.  In general there was a T1 hypointense T2 intermediate signal measuring 3.2 centimeters in greatest dimension on the left posterior nasal cavity which extended to the nasopharyngeal lumen.  There was evidence of left ethmoid air cell mucosal thickening and possible left sphenoid inflammatory  mucosal thickening.  There was no definite extension however, into the maxillary sinus, or into the ethmoid air cells or extension into the orbit, or palate.  There is no evidence of intracranial extension.  The right side was completely normal.    She was eventually saw Dr. Vyas at [facility] on 1/4/2023.  At that time, rigid nasal endoscopy was performed which demonstrated a left-sided nasal cavity mass which appears to be pedicled posteriorly in the nasal cavity.     She states that still has left-sided nasal obstruction with complete lack of airflow, and also has intermittent left facial pressure in the supraorbital location.  However she denies any epistaxis, and her facial pain.  She does endorse a 20/200 vision in the left eye which she has had since childhood but denies any new ocular changes of vision loss, double vision,/blurry vision.  She denies any loss of smell/taste, clear fluid, or any lumps or bumps in the neck.Denies any occupational exposures and any smoking history.  She denies any prior radiation.      Her case was discussed at head neck tumor board with recommendation for surgical excision of nasal cavity mass with final adjuvant treatment depending upon surgical pathology.     Interval history:    Patient was seen preoperatively and now presents after surgery.  On 1/17/2023 she underwent left endoscopic partial resection of sinonasal adenocarcinoma, frontal sinusotomy, total ethmoidectomy, maxillary antrostomy and sphenoidectomy. During the procedure it was noted that the tumor was in the posterior nasal cavity and was found to be pedicled in the nasal septum.  It was noted that the tumor involved the olfactory cleft adjacent to the cribriform plate and thus decision was made to perform a biopsy at this location, which returned positive.  Thus the procedure was aborted with plan for reresection to get a clear dural margin.    She underwent staged reresection with a nasoseptal  flap reconstruction on on 2/2/2023 given positive all laboratory cleft/cribriform plate involvement.  There was evidence of foci of residual adenocarcinoma within the olfactory cleft, but all negatives including the dural margins were negative.    Postoperatively she has had intermittent headaches and nausea and bloody drainage without any evidence of a CSF leak.  She was evaluated by Dr. Vyas on 2/16/2023 without any evidence of CSF leak and a healthy septal flap without any evidence of infection or recurrence.  There was however some residual packing left at the initial site of reconstruction.    A CT scan was prompted due to persistent headaches postoperatively which was obtained on 2/16/2023 which demonstrated postsurgical changes.    Today she presents for consideration of adjuvant radiotherapy.  Overall she states that she still has headaches but they are more milder in nature and she is is not currently taking any steroids at this time.  She denies any clear salty taste, but still has intermittent epistaxis.  She does not demonstrate any vision changes although she does have a history of 2/200 vision in the left eye since childhood.  She denies any prior radiation.    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/23/2022       Past Surgical History:   Past Surgical History:   Procedure Laterality Date     CATARACT IOL, RT/LT Left     as a child     HYSTERECTOMY  2015     LAPAROTOMY EXPLORATORY  2015     OPTICAL TRACKING SYSTEM ENDOSCOPIC EXCISION SINUS TUMOR Left 1/17/2023    Procedure: EXCISION, NEOPLASM, PARANASAL SINUS, ENDOSCOPIC, USING OPTICAL TRACKING SYSTEM;  Surgeon: Germain Vyas MD;  Location:  OR     OPTICAL TRACKING SYSTEM ENDOSCOPIC EXCISION SINUS TUMOR Bilateral 2/2/2023    Procedure: stealth  assisted endoscopic endonasal anterior craniofacial resection, nasoseptal flap;  Surgeon: eGrmain Vyas MD;  Location: [facility] OR     [place] BIOPSY NASAL LESION  12/23/2022     PROCURE GRAFT FAT Left 2/2/2023    Procedure: fascia elsy graft, left upper thigh;  Surgeon: Germain Vyas MD;  Location: [facility] OR     RETINAL DETACHMENT SURGERY Left 2019     TONSILLECTOMY      age 6       Chemotherapy History:  No prior chemotherapy    Radiation History:  No prior radiation    Pregnant: No, would obtain bHCG prior to CT simultion  Implanted Cardiac Devices: No    Medications:  Current Outpatient Medications   Medication     acetaminophen (TYLENOL) 325 MG tablet     Ascorbic Acid (VITAMIN C) 500 MG CAPS     azelaic acid (FINACIA) 15 % external gel     diclofenac (VOLTAREN) 1 % topical gel     ibuprofen (ADVIL/MOTRIN) 200 MG tablet     levocetirizine (XYZAL) 5 MG tablet     levothyroxine (SYNTHROID/LEVOTHROID) 75 MCG tablet     propranolol (INDERAL) 20 MG tablet     sodium chloride (OCEAN) 0.65 % nasal spray     tretinoin (RETIN-A) 0.05 % external cream     amoxicillin-clavulanate (AUGMENTIN) 875-125 MG tablet     ondansetron (ZOFRAN ODT) 4 MG ODT tab     rizatriptan (MAXALT) 10 MG tablet     No current facility-administered medications for this visit.       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 Pertinent findings are noted in the HPI.    Physical Exam   ECOG Status: 0    Vitals:  /78 (BP Location: Left arm, Patient Position: Chair, Cuff Size: Adult Regular)   Pulse 76   Temp 98.1  F (36.7  C) (Oral)   Resp 18   Wt 85.1 kg (187 lb 9.6 oz)   SpO2 97%   BMI 28.52 kg/m      Gen: Alert, in NAD  Head: NC/AT  Eyes: PERRL, EOMI, sclera anicteric  Ears: No external auricular lesions  Nose/sinus: No rhinorrhea or epistaxis  Oral cavity/oropharynx: MMM, no visible oral cavity lesions  Neck: Full ROM, supple, no palpable adenopathy  Pulm: No wheezing, stridor or respiratory distress  CV: Extremities are warm and well-perfused, no cyanosis, no pedal edema  Abdominal: Normal bowel sounds, soft, nontender, no masses  Musculoskeletal: Normal bulk and tone  Skin: Normal color and turgor  Neuro: A/Ox3, CN II-XII intact, normal gait    Imaging/Path/Labs   Imaging: Per HPI, reviewed and in agreement.  On personal review it appears that the left-sided nasal cavity mass appears to be pedicled and in the left posterior septum and is filling the entire nasal cavity proper without any evidence of bony erosion, with no discrete nasopharyngeal mucosal involvement, there is no evidence of any cranial extension, no evidence of cervical lymphadenopathy or distant disease    Path: Per HPI, reviewed and in agreement    Labs: Per HPI reviewed and in agreement    Assessment      Ms. 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     We discussed the management of sinonasal cancers. Per NCCN guidelines, general treatment paradigm for T1/T2 tumors involves surgical resection  (preferred) followed by adjuvant therapy as indicated. In general, T1 tumors (without adverse features and favorable histology) can be observed. Otherwise, if T2 or higher and with adverse features, adjuvant treatment includes RT +/- chemotherapy) In patient unfit for surgery or unresectable disease, definitive RT (T1/T2) or concurrent chemoRT (T3/T4a) can be considered.    In this particular instance, she has underwent surgical resection and was noted to have a locally advanced operatively differentiated adenocarcinoma of the nasal cavity with extension to the left olfactory grove adjacent to the cribriform plate with negative margins, pT3cN0. Thus, recommendation is to proceed with adjuvant radiotherapy for improved local control.    Plan     1. After extensive discussion, patient wishes to proceed with adjuvant radiation to improve local control for her locally advanced sinonasal carcinoma.     2. Recommendation is for 60Gy/30fx without chemotherapy. We did discuss the possibility of elective david irradiation, particularly given location of tumor and stage.      3. She still has persistent packing in place near the reconstruction site. This was discussed directly with Dr. Vyas, and he will see patient next week to remove the packing and we can proceed with CT simulation and radiation planning at that point.    All benefits and risks discussed, and patient is in agreement with the oncologic plan discussed above.     Thank you for allowing me to participate in your patient's care.  If you should require any additional information, please do not hesitate in contacting me.     Jules Pereira MD  Department of Radiation Oncology  [facility]

## 2023-03-03 ENCOUNTER — TELEPHONE (OUTPATIENT)
Dept: OTOLARYNGOLOGY | Facility: CLINIC | Age: 53
End: 2023-03-03
Payer: COMMERCIAL

## 2023-03-03 NOTE — TELEPHONE ENCOUNTER
This writer called patient to follow up on rescheduling her appointment with Dr. Vyas from next Friday to next Wednesday per request of the provider.    In conversation with patient she noted to this writer that she has been getting discolored drainage. Patient stated that she has been doing twice daily sinus rinses. Patient continues to have headaches, denying that they have changed in severity and noting that there has been no improvement in the pain as well. Patient has been alternating between Tylenol and ibuprofen for the pain. Patient denies having any fever or chills.    Message sent to provider as well as RNCC regarding patient's current symptoms. Patient was ordered Augmentin and prednisone but has not yet started the medication, per advisement of Dr. Vyas.    Patient is aware that we will follow up with her either today or Monday. Reviewed with patient that if she has worsening symptoms over the weekend that she needs to go in and seek medical attention.    Patient verbalizes understanding of this plan and is in agreement. Patient has no further questions at this time.    NARA WEN LPN on 3/3/2023 at 1:51 PM

## 2023-03-07 NOTE — PROGRESS NOTES
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 At our last visit on 2/16/23 she was healing well and she has consulted with radiation oncology with plan for adjuvant radiation therapy.     Sino-Nasal Outcome Test (SNOT - 22)  [facility] Operative History  Procedure Date: 02/02/2023     PREOPERATIVE DIAGNOSES:    1.  Intestinal type sinonasal adenocarcinoma.  2.  Nasal obstruction.     PREOPERATIVE DIAGNOSES:    1.  Intestinal type sinonasal adenocarcinoma.  2.  Nasal obstruction.     PROCEDURE PERFORMED:     1.  Endoscopic endonasal transcribriform resection of anterior cranial fossa intradural tumor, left sinonasal adenocarcinoma.  2.  [place] of right-sided pedicled nasoseptal flap pedicled off the posterior septal branch of sphenopalatine artery.     SURGEON:  Germain Vyas MD     CO-SURGEON:  Abhi Tidwell MD    Review of systems: A 14-point review of systems has been conducted and is negative for any notable symptoms, except as dictated in the history of present illness.     Physical Exam:  Vital signs: There were no vitals taken for this visit.   General Appearance: No acute distress, appropriate demeanor, conversant  Eyes: moist conjunctivae; EOMI; pupils symmetric; visual acuity grossly intact; no proptosis  Head: normocephalic; overall symmetric appearance without deformity  Face: overall symmetric without deformity; HB I/VI  Nose: No external deformity; see endoscopy  Lungs: symmetric chest rise; no wheezing  CV: Good distal perfusion; normal hear rate  Extremities: No deformity  Neurologic Exam: Cranial nerves II-XII are grossly intact; no focal deficit       Procedure Note  Procedure performed: Rigid nasal endoscopy  Indication: To evaluate for sinonasal pathology not  visualized on routine anterior rhinoscopy; Continued and routine endoscopy with debridement is indicated post-operatively to evaluate for CSF leak and defend against post-operative surgical site infection and/or untoward healing.   Anesthesia: 4% topical lidocaine with 0.05 % oxymetazoline  Description of procedure: A 30 degree, 3 mm rigid endoscope was inserted into bilateral nasal cavities and the nasal valves, nasal cavity, middle meatus, sphenoethmoid recess, nasopharynx were evaluated for evidence of obstruction, edema, purulence, polyps and/or mass/lesion.     Jia-Imtiaz Endoscopic Scoring System  Endoscopic observation Right Left   Polyps in middle meatus (0 = absent, 1 = restricted to middle meatus, 2 = Beyond middle meatus) *** ***   Discharge (0 = absent, 1 = thin and clear, 2 = thick, purulent) *** ***   Edema (0 = absent, 1 = mild-moderate, 2 = moderate-severe) *** ***   Crusting (0 = absent, 1 = mild-moderate, 2 = moderate-severe) *** ***   Scarring (0= absent, 1 = mild-moderate, 2 = moderate-severe) *** ***   Total *** ***     Findings  RT: ***  LT: ***    The patient tolerated the procedure well without complication.     Laboratory Review:  N/A     Imaging Review:  CT head 2/16/23  IMPRESSION:  Stable postsurgical changes of transnasal approach tumor resection with mild increase in left maxillary and frontal sinus mixed density effusion.    CT facial bones 2/2/23  Impression: Postsurgical changes from resection of left posterior nasal mass. Limited imaging performed primarily for the purposes of stereotactic localization.     MR skull base 1/3/23  IMPRESSION: 3.2 x 1.4 x 3.1 cm left posterior nasal cavity mass compatible with pathology proven adenocarcinoma..    PET oncology 1/3/23  IMPRESSION: In this patient with biopsy-proven left nasal cavity adenocarcinoma:  1. No evidence of metastasis in the chest, abdomen or pelvis.  2. Please refer to dedicated report for findings in the head and neck  region.    Pathology Review:  Surgical pathology 2/2/23  Final Diagnosis  A. LEFT POSTERIOR ETHMOID, EXCISIONAL BIOPSY:  -Benign sinonasal mucosa with chronic inflammation and focal calcifications.  B. LEFT ANTERIOR ETHMOID, EXCISIONAL BIOPSY:  -Benign bone and sinonasal mucosa with chronic inflammation.  C. LEFT LATERAL NASAL WALL, BIOPSY:  -Benign sinonasal mucosa with chronic inflammation  D. RIGHT OLFACTORY CLEFT, BIOPSY:  -Benign sinonasal mucosa with chronic inflammation.  E. LEFT ANTERIOR SEPTAL MARGIN, EXCISION:  -Benign sinonasal mucosa with chronic inflammation.  F. LEFT INFERIOR SEPTAL MARGIN, EXCISION:  -Benign sinonasal mucosa with chronic inflammation  G. LEFT SPHENOID ROSTRUM, EXCISION:  -Benign sinonasal mucosa with chronic inflammation  H. LEFT OLFACTORY CLEFT, BIOPSY:  -Benign fibroconnective tissue and bone.    Surgical pathology 1/17/23  Final Diagnosis  A. NOSE, LEFT SINONASAL TUMOR, EXCISION:  - SINONASAL ADENOCARCINOMA, INTESTINAL TYPE  - See comment  B. NOSE, LEFT MIDDLE TURBINATE, EXCISION:  - Fragments of benign respiratory mucosa and lamellar bone  - No evidence of malignancy  C. NOSE, LEFT OLFACTORY CLEFT, EXCISION:  - SINONASAL ADENOCARCINOMA, INTESTINAL TYPE    Nasal mass biopsy 12/23/2022  Final Diagnosis        NASAL MASS, BIOPSY:   1. Moderately-differentiated adenocarcinoma, favor sinonasal adenocarcinoma, non-intestinal type     Assessment/Medical Decision Making:  Left olfactory cleft ITAC  S/p stage 1 surgery  S/p stage 2 surgery 2/2/23  Nasal obstruction secondary to above  Atypical facial pain      Plan:  ***    Germain Vyas MD    [facility]  Rhinology, Endoscopic Skull Base Surgery  [facility]  Department of Otolaryngology - Head & Neck Surgery    Scribe Disclosure:  NAVEEN, Demi Guevara, am serving as a scribe to document services personally performed by Germain Vyas MD at this visit, based upon the provider's statements to  me. All documentation has been reviewed by the aforementioned provider prior to being entered into the official medical record. ***    Additional portions of the patient's history have been reviewed below.   ~~~~~~~~~~~~~~~~~~~~~~~~~~~~~~~~~~~~~~~~~~~~~~~~~~~~~~~~~~~~~~~~~~~~~~~~~~~~~~~~~~~~~~~~~~~~~~~~~~~~~~~~~~~~~~~~~~~~~~~~~~~~~~~~~~~~~~~    Past Medical History:   Diagnosis Date     History of hysterectomy 03/06/2015     Hypothyroidism      Motion sickness      PONV (postoperative nausea and vomiting)      Primary adenocarcinoma of nasal cavity (H) 12/23/2022        Past Surgical History:   Procedure Laterality Date     CATARACT IOL, RT/LT Left     as a child     HYSTERECTOMY  2015     LAPAROTOMY EXPLORATORY  2015     OPTICAL TRACKING SYSTEM ENDOSCOPIC EXCISION SINUS TUMOR Left 1/17/2023    Procedure: EXCISION, NEOPLASM, PARANASAL SINUS, ENDOSCOPIC, USING OPTICAL TRACKING SYSTEM;  Surgeon: Germain Vyas MD;  Location: [facility] OR     OPTICAL TRACKING SYSTEM ENDOSCOPIC EXCISION SINUS TUMOR Bilateral 2/2/2023    Procedure: stealth assisted endoscopic endonasal anterior craniofacial resection, nasoseptal flap;  Surgeon: Germain Vyas MD;  Location: [facility] OR     SD BIOPSY NASAL LESION  12/23/2022     PROCURE GRAFT FAT Left 2/2/2023    Procedure: fascia elsy graft, left upper thigh;  Surgeon: Germain Vyas MD;  Location: [facility] OR     RETINAL DETACHMENT SURGERY Left 2019     TONSILLECTOMY      age 6        Family History   Problem Relation Age of Onset     Breast Cancer Mother      Breast Cancer Maternal Aunt      Breast Cancer Maternal Aunt      Lung Cancer Maternal Aunt      Bladder Cancer Maternal Aunt      Cancer Maternal Uncle         Eye        Social History     Socioeconomic History     Marital status:    Tobacco Use     Smoking status: Never     Smokeless tobacco: Never   Substance and Sexual Activity     Alcohol use: Yes     Comment: social use per patient     Drug use: Never

## 2023-03-08 ENCOUNTER — OFFICE VISIT (OUTPATIENT)
Dept: OTOLARYNGOLOGY | Facility: CLINIC | Age: 53
End: 2023-03-08
Payer: COMMERCIAL

## 2023-03-08 VITALS
SYSTOLIC BLOOD PRESSURE: 124 MMHG | HEIGHT: 68 IN | DIASTOLIC BLOOD PRESSURE: 79 MMHG | HEART RATE: 68 BPM | BODY MASS INDEX: 28.63 KG/M2 | WEIGHT: 188.9 LBS

## 2023-03-08 DIAGNOSIS — J34.89 NASAL OBSTRUCTION: Primary | ICD-10-CM

## 2023-03-08 DIAGNOSIS — C31.9 SINONASAL UNDIFFERENTIATED CARCINOMA (H): ICD-10-CM

## 2023-03-08 PROCEDURE — 31237 NSL/SINS NDSC SURG BX POLYPC: CPT | Mod: 50 | Performed by: OTOLARYNGOLOGY

## 2023-03-08 ASSESSMENT — PAIN SCALES - GENERAL: PAINLEVEL: NO PAIN (0)

## 2023-03-08 NOTE — LETTER
3/8/2023       RE: Rosalind Murphy  [id number] [address] [facility] [address] [facility] [address]     Dear Colleague,    Thank you for referring your patient, Rosalind Murphy, to the [facility] [place] [facility]. Please see a copy of my visit note below.    Otolaryngology-Head and Neck Surgery    Rosalind Murphy is s/p endonasal transcribriform resection of sinonasal adenocarcinoma. She comes in for debridement. She had a right based septal flap for reconstruction. She was last debrided by Dr. Vysa and he visualized the pedicle with good blood flow. He left some additional packing in place over the reconstruction.     She had been experiencing some headaches and Dr. Vyas had prescribed some antibiotics and steroids which she has not started. She feels the last three days have been worse and that she feels more congested. She has been irrigating twice a day and has had some mucopurulence evacuated.     She describes anosmia and poor taste sensation.     PROCEDURE: nasal endoscopy and debridement    Indication: sinonasal approach to tumor with post operative changes and reconstruction  Performed by: Becca Hall MD    Written consent was obtained.  4% lidocaine and oxymetazoline were administered topically.    Nasal Endoscopy is performed to provide a more thorough evaluation of the nasal airway than seen on standard nasal speculum exam.  Findings are as follows:     Both nasal passages has mucopurulence, left greater than right. The right site of the donor for the flap had crusting as expected. The left allowed for suctioning and gentle manipulation of the residual packing. In the transition of instruments the patient described it coming into the back of her throat and swallowing some debris. The reconstructed site was visualized and totally intact. No evidence of any CSF drainage. She tolerated the procedure well. Photodocumentation was placed in  image stream.    A/P:  Mucupurulence at the surgical cavity with symptoms  She will start the Augmentin previously prescribed. At this point we will hold on the antibiotics. She will increase for the next 5 days irrigation by adding one int he middle of the day, she is already doing this in the morning and night.     We will maintain lifting and effort precautions and continue to hold on nose blowing and using straws.     I will communicate with Dr. Vyas's team.        Again, thank you for allowing me to participate in the care of your patient.      Sincerely,    Becca Hall MD

## 2023-03-09 ENCOUNTER — APPOINTMENT (OUTPATIENT)
Dept: RADIATION ONCOLOGY | Facility: CLINIC | Age: 53
End: 2023-03-09
Payer: COMMERCIAL

## 2023-03-09 PROCEDURE — 77263 THER RADIOLOGY TX PLNG CPLX: CPT | Performed by: SURGERY

## 2023-03-09 PROCEDURE — 77333 RADIATION TREATMENT AID(S): CPT | Mod: 59 | Performed by: SURGERY

## 2023-03-09 PROCEDURE — 77334 RADIATION TREATMENT AID(S): CPT | Performed by: SURGERY

## 2023-03-09 NOTE — PROGRESS NOTES
Otolaryngology-Head and Neck Surgery    Rosalind Murphy is s/p endonasal transcribriform resection of sinonasal adenocarcinoma. She comes in for debridement. She had a right based septal flap for reconstruction. She was last debrided by Dr. Vyas and he visualized the pedicle with good blood flow. He left some additional packing in place over the reconstruction.     She had been experiencing some headaches and Dr. Vyas had prescribed some antibiotics and steroids which she has not started. She feels the last three days have been worse and that she feels more congested. She has been irrigating twice a day and has had some mucopurulence evacuated.     She describes anosmia and poor taste sensation.     PROCEDURE: nasal endoscopy and debridement    Indication: sinonasal approach to tumor with post operative changes and reconstruction  Performed by: Becca Hall MD    Written consent was obtained.  4% lidocaine and oxymetazoline were administered topically.    Nasal Endoscopy is performed to provide a more thorough evaluation of the nasal airway than seen on standard nasal speculum exam.  Findings are as follows:     Both nasal passages has mucopurulence, left greater than right. The right site of the donor for the flap had crusting as expected. The left allowed for suctioning and gentle manipulation of the residual packing. In the transition of instruments the patient described it coming into the back of her throat and swallowing some debris. The reconstructed site was visualized and totally intact. No evidence of any CSF drainage. She tolerated the procedure well. Photodocumentation was placed in image stream.    A/P:  Mucupurulence at the surgical cavity with symptoms  She will start the Augmentin previously prescribed. At this point we will hold on the antibiotics. She will increase for the next 5 days irrigation by adding one int he middle of the day, she is already doing this in the morning and night.     We  will maintain lifting and effort precautions and continue to hold on nose blowing and using straws.     I will communicate with Dr. Vyas's team.

## 2023-03-10 ENCOUNTER — MYC MEDICAL ADVICE (OUTPATIENT)
Dept: OTOLARYNGOLOGY | Facility: CLINIC | Age: 53
End: 2023-03-10

## 2023-03-21 ENCOUNTER — APPOINTMENT (OUTPATIENT)
Dept: RADIATION ONCOLOGY | Facility: CLINIC | Age: 53
End: 2023-03-21
Payer: COMMERCIAL

## 2023-03-21 DIAGNOSIS — C30.0 PRIMARY ADENOCARCINOMA OF NASAL CAVITY (H): Primary | ICD-10-CM

## 2023-03-21 PROCEDURE — 77301 RADIOTHERAPY DOSE PLAN IMRT: CPT | Performed by: SURGERY

## 2023-03-21 PROCEDURE — 77300 RADIATION THERAPY DOSE PLAN: CPT | Performed by: SURGERY

## 2023-03-21 PROCEDURE — 77338 DESIGN MLC DEVICE FOR IMRT: CPT | Performed by: SURGERY

## 2023-03-21 RX ORDER — GABAPENTIN 300 MG/1
1200 CAPSULE ORAL 3 TIMES DAILY
Qty: 840 CAPSULE | Refills: 0 | Status: SHIPPED | OUTPATIENT
Start: 2023-03-21 | End: 2023-08-18

## 2023-03-22 ENCOUNTER — APPOINTMENT (OUTPATIENT)
Dept: RADIATION ONCOLOGY | Facility: CLINIC | Age: 53
End: 2023-03-22
Payer: COMMERCIAL

## 2023-03-22 PROCEDURE — 77014 PR CT GUIDE FOR PLACEMENT RADIATION THERAPY FIELDS: CPT | Performed by: SURGERY

## 2023-03-23 ENCOUNTER — APPOINTMENT (OUTPATIENT)
Dept: RADIATION ONCOLOGY | Facility: CLINIC | Age: 53
End: 2023-03-23
Payer: COMMERCIAL

## 2023-03-23 PROCEDURE — 77014 PR CT GUIDE FOR PLACEMENT RADIATION THERAPY FIELDS: CPT | Performed by: SURGERY

## 2023-03-23 PROCEDURE — 77386 PR IMRT TREATMENT DELIVERY, COMPLEX: CPT | Performed by: SURGERY

## 2023-03-24 ENCOUNTER — APPOINTMENT (OUTPATIENT)
Dept: RADIATION ONCOLOGY | Facility: CLINIC | Age: 53
End: 2023-03-24
Payer: COMMERCIAL

## 2023-03-24 PROCEDURE — 77014 PR CT GUIDE FOR PLACEMENT RADIATION THERAPY FIELDS: CPT | Performed by: SURGERY

## 2023-03-24 PROCEDURE — 77386 PR IMRT TREATMENT DELIVERY, COMPLEX: CPT | Performed by: SURGERY

## 2023-03-27 ENCOUNTER — APPOINTMENT (OUTPATIENT)
Dept: RADIATION ONCOLOGY | Facility: CLINIC | Age: 53
End: 2023-03-27
Payer: COMMERCIAL

## 2023-03-27 PROCEDURE — 77014 PR CT GUIDE FOR PLACEMENT RADIATION THERAPY FIELDS: CPT | Performed by: RADIOLOGY

## 2023-03-27 PROCEDURE — 77386 PR IMRT TREATMENT DELIVERY, COMPLEX: CPT | Performed by: RADIOLOGY

## 2023-03-28 ENCOUNTER — APPOINTMENT (OUTPATIENT)
Dept: RADIATION ONCOLOGY | Facility: CLINIC | Age: 53
End: 2023-03-28
Payer: COMMERCIAL

## 2023-03-28 PROCEDURE — 77386 PR IMRT TREATMENT DELIVERY, COMPLEX: CPT | Performed by: RADIOLOGY

## 2023-03-28 PROCEDURE — 77014 PR CT GUIDE FOR PLACEMENT RADIATION THERAPY FIELDS: CPT | Performed by: RADIOLOGY

## 2023-03-29 ENCOUNTER — APPOINTMENT (OUTPATIENT)
Dept: RADIATION ONCOLOGY | Facility: CLINIC | Age: 53
End: 2023-03-29
Payer: COMMERCIAL

## 2023-03-29 ENCOUNTER — OFFICE VISIT (OUTPATIENT)
Dept: RADIATION ONCOLOGY | Facility: CLINIC | Age: 53
End: 2023-03-29
Payer: COMMERCIAL

## 2023-03-29 VITALS
BODY MASS INDEX: 29.19 KG/M2 | RESPIRATION RATE: 18 BRPM | HEART RATE: 69 BPM | SYSTOLIC BLOOD PRESSURE: 121 MMHG | OXYGEN SATURATION: 96 % | TEMPERATURE: 97.3 F | WEIGHT: 192 LBS | DIASTOLIC BLOOD PRESSURE: 77 MMHG

## 2023-03-29 DIAGNOSIS — C30.0 PRIMARY ADENOCARCINOMA OF NASAL CAVITY (H): Primary | ICD-10-CM

## 2023-03-29 PROCEDURE — 99207 PR DROP WITH A PROCEDURE: CPT | Performed by: SURGERY

## 2023-03-29 PROCEDURE — 77427 RADIATION TX MANAGEMENT X5: CPT | Performed by: SURGERY

## 2023-03-29 PROCEDURE — 77014 PR CT GUIDE FOR PLACEMENT RADIATION THERAPY FIELDS: CPT | Performed by: SURGERY

## 2023-03-29 PROCEDURE — 77336 RADIATION PHYSICS CONSULT: CPT | Performed by: SURGERY

## 2023-03-29 PROCEDURE — 77386 PR IMRT TREATMENT DELIVERY, COMPLEX: CPT | Performed by: SURGERY

## 2023-03-29 RX ORDER — DEXAMETHASONE 2 MG/1
2 TABLET ORAL 3 TIMES DAILY
Qty: 90 TABLET | Refills: 0 | Status: SHIPPED | OUTPATIENT
Start: 2023-03-29 | End: 2023-08-18

## 2023-03-29 ASSESSMENT — PAIN SCALES - GENERAL: PAINLEVEL: MILD PAIN (2)

## 2023-03-29 NOTE — PROGRESS NOTES
[facility] PHYSICIANS  SPECIALIZING IN BREAKTHROUGHS  Radiation Oncology    On Treatment Visit Note      Rosalind Murphy      Date: Mar 29, 2023   MRN: [id number]   : 1970  Diagnosis: left sinonasal adenocarcinoma        ID: Ms. 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 Recommendation is for 60Gy/30fx without chemotherapy. We did discussed the role of elective david irradiation, particularly given location of tumor and stage.     Reason for Visit:  On Radiation Treatment Visit       Treatment Summary to Date  Treatment Site: sinonasal Current Dose: 1000/6000 cGy Fractions: 5/30      Chemotherapy  Chemo concurrent with radx?: No    Subjective:   No complaints, tolerating RT well overall. Mild headaches, 2/10, no neurologic changes, no nausea, no drainage, clear fluid, salty taste. Mild fatigue, still working.     Pain Control: Gabapentin 600 mg, 600 mg, 600mg  Fluid Intake: Yes, PO  Nutrition: PO  Rinses: 10x  Skin care: Aquaphor  Chemotherapy: n/a    Nursing ROS:   Nutrition Alteration  Diet Type: Patient's Preference  Skin  Skin Reaction: 0 - No changes  Skin Intervention: patient reports applying Aquaphor three times daily  CNS Alteration  CNS note: patient reports frontal headache today  ENT and Mouth Exam  ENT/Mouth Note: patient reports rinsing with baking soda and salt rinse 10 times per day, reports dry mouth and thick saliva, using cool mist humidifier at night     Gastrointestinal  Nausea: 1 - One to two episodes of nausea/24     Psychosocial  Mood - Anxiety: 0 - Normal  Mood - Depression: 0 - Normal  Psychosocial Note: fatigue at baseline, patient reports she has returned to work this week  Pain  Assessment  0-10 Pain Scale: 2  Pain Descriptors: Headache  Pain Treatment: denies  Pain Note: Dr. Pereira has reviewed Decadron prescription with patient      Objective:   /77 (BP Location: Left arm, Patient Position: Chair, Cuff Size: Adult Large)   Pulse 69   Temp 97.3  F (36.3  C) (Oral)   Resp 18   Wt 87.1 kg (192 lb)   SpO2 96%   BMI 29.19 kg/m    Gen: Appears well, in no acute distress  Skin: No erythema  CV/Resp: rrr, breathing comfortably on room air  Neuro: CN 2-12 grossly intact, UE/LE full strength     Labs:  CBC RESULTS: Recent Labs   Lab Test 02/03/23  0738   WBC 10.2   RBC 3.99   HGB 12.2   HCT 37.3   MCV 94   MCH 30.6   MCHC 32.7   RDW 11.9        ELECTROLYTES:  Recent Labs   Lab Test 02/05/23  0834 02/04/23  0608 02/03/23  0738   NA  --   --  140   POTASSIUM  --   --  4.1   CHLORIDE  --   --  104   NERY  --   --  8.6   CO2  --   --  26   BUN  --   --  8.7   CR  --   --  0.74   GLC 89   < > 118*    < > = values in this interval not displayed.       Assessment:    Tolerating radiation therapy well.  All questions and concerns addressed.      Plan:   1. Continue current therapy.    2. Continue gabapentin, rinses, nutrition, hydration, skin care  3. Dex 2mg BID       Mosaiq chart and setup information reviewed  Ports checked and MVCT/IGRT images checked    Medication Review  Med list reviewed with patient?: Yes  Med list printed and given: Offered and declined  Med Note: patient taking Gabapentin po 600 mg at breakfast, 600 mg at lunch and 900 mg at dinner    Educational Topic Discussed  Education Instructions: radiation therapy side effects: fatigue, skin changes and skin cares, dry mouth and thick saliva, mouth and throat sores, taste/smell changes, pain/difficulty with swallowing    Juels Pereira MD  Radiation Oncology   [facility]  Clinic: [phone]

## 2023-03-29 NOTE — LETTER
3/29/2023         RE: Rosalind Murphy  [address] [facility] [address]        Dear Colleague,    Thank you for referring your patient, Rosalind Murphy, to the [facility] RADIATION ONCOLOGY [place]. Please see a copy of my visit note below.    [facility] PHYSICIANS  SPECIALIZING IN BREAKTHROUGHS  Radiation Oncology    On Treatment Visit Note      Rosalind Murphy      Date: Mar 29, 2023   MRN: [id number]   : 1970  Diagnosis: left sinonasal adenocarcinoma        ID: Ms. 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 Recommendation is for 60Gy/30fx without chemotherapy. We did discussed the role of elective david irradiation, particularly given location of tumor and stage.     Reason for Visit:  On Radiation Treatment Visit       Treatment Summary to Date  Treatment Site: sinonasal Current Dose: 1000/6000 cGy Fractions:       Chemotherapy  Chemo concurrent with radx?: No    Subjective:   No complaints, tolerating RT well overall. Mild headaches, 2/10, no neurologic changes, no nausea, no drainage, clear fluid, salty taste. Mild fatigue, still working.     Pain Control: Gabapentin 600 mg, 600 mg, 600mg  Fluid Intake: Yes, PO  Nutrition: PO  Rinses: 10x  Skin care: Aquaphor  Chemotherapy: n/a    Nursing ROS:   Nutrition Alteration  Diet Type: Patient's Preference  Skin  Skin Reaction: 0 - No changes  Skin Intervention: patient reports applying Aquaphor three times daily  CNS Alteration  CNS note: patient reports frontal headache today  ENT and Mouth Exam  ENT/Mouth Note: patient reports rinsing with baking soda and salt rinse 10 times per day, reports dry mouth and thick saliva, using cool mist  humidifier at night     Gastrointestinal  Nausea: 1 - One to two episodes of nausea/24     Psychosocial  Mood - Anxiety: 0 - Normal  Mood - Depression: 0 - Normal  Psychosocial Note: fatigue at baseline, patient reports she has returned to work this week  Pain Assessment  0-10 Pain Scale: 2  Pain Descriptors: Headache  Pain Treatment: denies  Pain Note: Dr. Pereira has reviewed Decadron prescription with patient      Objective:   /77 (BP Location: Left arm, Patient Position: Chair, Cuff Size: Adult Large)   Pulse 69   Temp 97.3  F (36.3  C) (Oral)   Resp 18   Wt 87.1 kg (192 lb)   SpO2 96%   BMI 29.19 kg/m    Gen: Appears well, in no acute distress  Skin: No erythema  CV/Resp: rrr, breathing comfortably on room air  Neuro: CN 2-12 grossly intact, UE/LE full strength     Labs:  CBC RESULTS: Recent Labs   Lab Test 02/03/23  0738   WBC 10.2   RBC 3.99   HGB 12.2   HCT 37.3   MCV 94   MCH 30.6   MCHC 32.7   RDW 11.9        ELECTROLYTES:  Recent Labs   Lab Test 02/05/23  0834 02/04/23  0608 02/03/23  0738   NA  --   --  140   POTASSIUM  --   --  4.1   CHLORIDE  --   --  104   NERY  --   --  8.6   CO2  --   --  26   BUN  --   --  8.7   CR  --   --  0.74   GLC 89   < > 118*    < > = values in this interval not displayed.       Assessment:    Tolerating radiation therapy well.  All questions and concerns addressed.      Plan:   1. Continue current therapy.    2. Continue gabapentin, rinses, nutrition, hydration, skin care  3. Dex 2mg BID       Mosaiq chart and setup information reviewed  Ports checked and MVCT/IGRT images checked    Medication Review  Med list reviewed with patient?: Yes  Med list printed and given: Offered and declined  Med Note: patient taking Gabapentin po 600 mg at breakfast, 600 mg at lunch and 900 mg at dinner    Educational Topic Discussed  Education Instructions: radiation therapy side effects: fatigue, skin changes and skin cares, dry mouth and thick saliva, mouth and throat sores,  taste/smell changes, pain/difficulty with swallowing    Jules Pereira MD  Radiation Oncology   [facility]: [phone]          Again, thank you for allowing me to participate in the care of your patient.        Sincerely,        Jules Pereira MD

## 2023-03-29 NOTE — PATIENT INSTRUCTIONS
Please contact [facility] RN with questions or concerns following today's appointment: [phone].    Thank you!

## 2023-03-30 ENCOUNTER — APPOINTMENT (OUTPATIENT)
Dept: RADIATION ONCOLOGY | Facility: CLINIC | Age: 53
End: 2023-03-30
Payer: COMMERCIAL

## 2023-03-30 PROCEDURE — 77386 PR IMRT TREATMENT DELIVERY, COMPLEX: CPT | Performed by: SURGERY

## 2023-03-30 PROCEDURE — 77014 PR CT GUIDE FOR PLACEMENT RADIATION THERAPY FIELDS: CPT | Performed by: SURGERY

## 2023-03-31 ENCOUNTER — APPOINTMENT (OUTPATIENT)
Dept: RADIATION ONCOLOGY | Facility: CLINIC | Age: 53
End: 2023-03-31
Payer: COMMERCIAL

## 2023-03-31 PROCEDURE — 77386 PR IMRT TREATMENT DELIVERY, COMPLEX: CPT | Performed by: SURGERY

## 2023-03-31 PROCEDURE — 77014 PR CT GUIDE FOR PLACEMENT RADIATION THERAPY FIELDS: CPT | Performed by: SURGERY

## 2023-04-03 ENCOUNTER — APPOINTMENT (OUTPATIENT)
Dept: RADIATION ONCOLOGY | Facility: CLINIC | Age: 53
End: 2023-04-03
Payer: COMMERCIAL

## 2023-04-03 PROCEDURE — 77386 PR IMRT TREATMENT DELIVERY, COMPLEX: CPT | Performed by: RADIOLOGY

## 2023-04-03 PROCEDURE — 77014 PR CT GUIDE FOR PLACEMENT RADIATION THERAPY FIELDS: CPT | Performed by: RADIOLOGY

## 2023-04-04 ENCOUNTER — APPOINTMENT (OUTPATIENT)
Dept: RADIATION ONCOLOGY | Facility: CLINIC | Age: 53
End: 2023-04-04
Payer: COMMERCIAL

## 2023-04-04 PROCEDURE — 77014 PR CT GUIDE FOR PLACEMENT RADIATION THERAPY FIELDS: CPT | Performed by: RADIOLOGY

## 2023-04-04 PROCEDURE — 77386 PR IMRT TREATMENT DELIVERY, COMPLEX: CPT | Performed by: RADIOLOGY

## 2023-04-05 ENCOUNTER — OFFICE VISIT (OUTPATIENT)
Dept: RADIATION ONCOLOGY | Facility: CLINIC | Age: 53
End: 2023-04-05
Payer: COMMERCIAL

## 2023-04-05 ENCOUNTER — APPOINTMENT (OUTPATIENT)
Dept: RADIATION ONCOLOGY | Facility: CLINIC | Age: 53
End: 2023-04-05
Payer: COMMERCIAL

## 2023-04-05 VITALS
WEIGHT: 192.6 LBS | OXYGEN SATURATION: 97 % | HEART RATE: 74 BPM | TEMPERATURE: 98.5 F | SYSTOLIC BLOOD PRESSURE: 125 MMHG | DIASTOLIC BLOOD PRESSURE: 78 MMHG | BODY MASS INDEX: 29.28 KG/M2 | RESPIRATION RATE: 18 BRPM

## 2023-04-05 DIAGNOSIS — C30.0 PRIMARY ADENOCARCINOMA OF NASAL CAVITY (H): Primary | ICD-10-CM

## 2023-04-05 PROCEDURE — 77336 RADIATION PHYSICS CONSULT: CPT | Performed by: SURGERY

## 2023-04-05 PROCEDURE — 99207 PR DROP WITH A PROCEDURE: CPT | Performed by: SURGERY

## 2023-04-05 PROCEDURE — 77386 PR IMRT TREATMENT DELIVERY, COMPLEX: CPT | Performed by: SURGERY

## 2023-04-05 PROCEDURE — 77427 RADIATION TX MANAGEMENT X5: CPT | Performed by: SURGERY

## 2023-04-05 PROCEDURE — 77014 PR CT GUIDE FOR PLACEMENT RADIATION THERAPY FIELDS: CPT | Performed by: SURGERY

## 2023-04-05 ASSESSMENT — PAIN SCALES - GENERAL: PAINLEVEL: NO PAIN (0)

## 2023-04-05 NOTE — PROGRESS NOTES
[facility] PHYSICIANS  SPECIALIZING IN BREAKTHROUGHS  Radiation Oncology    On Treatment Visit Note      Rosalind Murphy      Date: 2023   MRN: [id number]   : 1970  Diagnosis: left sinonasal adenocarcinoma        ID: Ms. 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 Recommendation is for 60Gy/30fx without chemotherapy. We did discussed the role of elective david irradiation, particularly given location of tumor and stage.     Reason for Visit:  On Radiation Treatment Visit       Treatment Summary to Date  Treatment Site: sinonasal Current Dose: 2000/6000 cGy Fractions: 10/30      Chemotherapy  Chemo concurrent with radx?: No    Subjective:   No complaints, tolerating RT well overall. Mild HA, and nausea has not yet started dex 2mg BID. Mild fatigue, but still working. Has altered taste and dry mouth.    Pain Control: Gabapentin 600mg, 600mg, 900 mg  Fluid Intake: Adequapate  Nutrition: PO  Rinses: 10-15 x per day  Skin care: Aquaphor BID  Chemotherapy: No chemotherapy     Nursing ROS:   Nutrition Alteration  Diet Type: Patient's Preference  Skin  Skin Reaction: 0 - No changes  Skin Intervention: patient reports applying Aquaphor three times daily  CNS Alteration  CNS note: patient reports intermittent frontal headaches  ENT and Mouth Exam  ENT/Mouth Note: patient reports rinsing with baking soda and salt rinse 10-15 times per day, reports dry mouth and thick saliva, using cool mist humidifier at night, reports taste changes over the past one week, patient reports intermittent loss of smell     Gastrointestinal  Nausea: 2 - Three to four 4 episodes of nausea/24     Psychosocial  Mood - Anxiety: 0 -  Normal  Mood - Depression: 0 - Normal  Psychosocial Note: fatigue at baseline, intermittent fatigue this past week  Pain Assessment  0-10 Pain Scale: 0  Pain Treatment: denies  Pain Note: Dr. Pereira has reviewed Decadron prescription with patient      Objective:   /78 (BP Location: Left arm, Patient Position: Chair, Cuff Size: Adult Regular)   Pulse 74   Temp 98.5  F (36.9  C) (Oral)   Resp 18   Wt 87.4 kg (192 lb 9.6 oz)   SpO2 97%   BMI 29.28 kg/m    Gen: Appears well, in no acute distress  Skin: No erythema  CV/Resp: rrr, breathing comfortably on room air  Neuro: CN 2-12 grossly intact, UE/LE full strength     Labs:  CBC RESULTS: Recent Labs   Lab Test 02/03/23  0738   WBC 10.2   RBC 3.99   HGB 12.2   HCT 37.3   MCV 94   MCH 30.6   MCHC 32.7   RDW 11.9        ELECTROLYTES:  Recent Labs   Lab Test 02/05/23  0834 02/04/23  0608 02/03/23  0738   NA  --   --  140   POTASSIUM  --   --  4.1   CHLORIDE  --   --  104   NERY  --   --  8.6   CO2  --   --  26   BUN  --   --  8.7   CR  --   --  0.74   GLC 89   < > 118*    < > = values in this interval not displayed.       Assessment:    Tolerating radiation therapy well.  All questions and concerns addressed.      Plan:   1. Continue current therapy.    2. Continue gabapentin, rinses, nutrition, hydration, skin care  3. Re-discussed starting dex 2 mg BID with GI ppx      Mosaiq chart and setup information reviewed  Ports checked and MVCT/IGRT images checked    Medication Review  Med list reviewed with patient?: Yes  Med list printed and given: Offered and declined  Med Note: patient taking Gabapentin po 600 mg at breakfast, 600 mg at lunch and 900 mg at dinner    Educational Topic Discussed  Education Instructions: radiation therapy side effects: fatigue, skin changes and skin cares, dry mouth and thick saliva, mouth and throat sores, taste/smell changes, pain/difficulty with swallowing    Jules Pereira MD  Radiation Oncology   [facility]  [facility]: [phone]

## 2023-04-05 NOTE — PATIENT INSTRUCTIONS
Please contact [facility] RN with questions or concerns following today's appointment: [phone].    Thank you!

## 2023-04-05 NOTE — LETTER
2023         RE: Rosalind Murphy  [address] [address] [place] [address]        Dear Colleague,    Thank you for referring your patient, Rosalind Murphy, to the [facility] RADIATION [facility]COLOG[facility] [place]. Please see a copy of my visit note below.    [facility] PHYSICIANS  SPECIALIZING IN BREAKTHROUGHS  Radiation Oncology    On Treatment Visit Note      Rosalind Murphy      Date: 2023   MRN: [id number]   : 1970  Diagnosis: left sinonasal adenocarcinoma        ID: Ms. 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 Recommendation is for 60Gy/30fx without chemotherapy. We did discussed the role of elective david irradiation, particularly given location of tumor and stage.     Reason for Visit:  On Radiation Treatment Visit       Treatment Summary to Date  Treatment Site: sinonasal Current Dose: 2000/6000 cGy Fractions: 10/30      Chemotherapy  Chemo concurrent with radx?: No    Subjective:   No complaints, tolerating RT well overall. Mild HA, and nausea has not yet started dex 2mg BID. Mild fatigue, but still working. Has altered taste and dry mouth.    Pain Control: Gabapentin 600mg, 600mg, 900 mg  Fluid Intake: Adequapate  Nutrition: PO  Rinses: 10-15 x per day  Skin care: Aquaphor BID  Chemotherapy: No chemotherapy     Nursing ROS:   Nutrition Alteration  Diet Type: Patient's Preference  Skin  Skin Reaction: 0 - No changes  Skin Intervention: patient reports applying Aquaphor three times daily  CNS Alteration  CNS note: patient reports intermittent frontal headaches  ENT and Mouth Exam  ENT/Mouth Note: patient reports rinsing with baking soda and salt rinse 10-15 times per day, reports dry mouth  and thick saliva, using cool mist humidifier at night, reports taste changes over the past one week, patient reports intermittent loss of smell     Gastrointestinal  Nausea: 2 - Three to four 4 episodes of nausea/24     Psychosocial  Mood - Anxiety: 0 - Normal  Mood - Depression: 0 - Normal  Psychosocial Note: fatigue at baseline, intermittent fatigue this past week  Pain Assessment  0-10 Pain Scale: 0  Pain Treatment: denies  Pain Note: Dr. Pereira has reviewed Decadron prescription with patient      Objective:   /78 (BP Location: Left arm, Patient Position: Chair, Cuff Size: Adult Regular)   Pulse 74   Temp 98.5  F (36.9  C) (Oral)   Resp 18   Wt 87.4 kg (192 lb 9.6 oz)   SpO2 97%   BMI 29.28 kg/m    Gen: Appears well, in no acute distress  Skin: No erythema  CV/Resp: rrr, breathing comfortably on room air  Neuro: CN 2-12 grossly intact, UE/LE full strength     Labs:  CBC RESULTS: Recent Labs   Lab Test 02/03/23  0738   WBC 10.2   RBC 3.99   HGB 12.2   HCT 37.3   MCV 94   MCH 30.6   MCHC 32.7   RDW 11.9        ELECTROLYTES:  Recent Labs   Lab Test 02/05/23  0834 02/04/23  0608 02/03/23  0738   NA  --   --  140   POTASSIUM  --   --  4.1   CHLORIDE  --   --  104   NERY  --   --  8.6   CO2  --   --  26   BUN  --   --  8.7   CR  --   --  0.74   GLC 89   < > 118*    < > = values in this interval not displayed.       Assessment:    Tolerating radiation therapy well.  All questions and concerns addressed.      Plan:   1. Continue current therapy.    2. Continue gabapentin, rinses, nutrition, hydration, skin care  3. Re-discussed starting dex 2 mg BID with GI ppx      Mosaiq chart and setup information reviewed  Ports checked and MVCT/IGRT images checked    Medication Review  Med list reviewed with patient?: Yes  Med list printed and given: Offered and declined  Med Note: patient taking Gabapentin po 600 mg at breakfast, 600 mg at lunch and 900 mg at dinner    Educational Topic Discussed  Education  Instructions: radiation therapy side effects: fatigue, skin changes and skin cares, dry mouth and thick saliva, mouth and throat sores, taste/smell changes, pain/difficulty with swallowing    Jules Pereira MD  Radiation Oncology   [facility]: [phone]          Again, thank you for allowing me to participate in the care of your patient.        Sincerely,        Jules Pereira MD

## 2023-04-06 ENCOUNTER — APPOINTMENT (OUTPATIENT)
Dept: RADIATION ONCOLOGY | Facility: CLINIC | Age: 53
End: 2023-04-06
Payer: COMMERCIAL

## 2023-04-06 PROCEDURE — 77014 PR CT GUIDE FOR PLACEMENT RADIATION THERAPY FIELDS: CPT | Performed by: SURGERY

## 2023-04-06 PROCEDURE — 77386 PR IMRT TREATMENT DELIVERY, COMPLEX: CPT | Performed by: SURGERY

## 2023-04-07 ENCOUNTER — APPOINTMENT (OUTPATIENT)
Dept: RADIATION ONCOLOGY | Facility: CLINIC | Age: 53
End: 2023-04-07
Payer: COMMERCIAL

## 2023-04-07 PROCEDURE — 77014 PR CT GUIDE FOR PLACEMENT RADIATION THERAPY FIELDS: CPT | Performed by: SURGERY

## 2023-04-07 PROCEDURE — 77386 PR IMRT TREATMENT DELIVERY, COMPLEX: CPT | Performed by: SURGERY

## 2023-04-10 ENCOUNTER — APPOINTMENT (OUTPATIENT)
Dept: RADIATION ONCOLOGY | Facility: CLINIC | Age: 53
End: 2023-04-10
Payer: COMMERCIAL

## 2023-04-10 PROCEDURE — 77014 PR CT GUIDE FOR PLACEMENT RADIATION THERAPY FIELDS: CPT | Performed by: RADIOLOGY

## 2023-04-10 PROCEDURE — 77386 PR IMRT TREATMENT DELIVERY, COMPLEX: CPT | Performed by: RADIOLOGY

## 2023-04-11 ENCOUNTER — APPOINTMENT (OUTPATIENT)
Dept: RADIATION ONCOLOGY | Facility: CLINIC | Age: 53
End: 2023-04-11
Payer: COMMERCIAL

## 2023-04-11 PROCEDURE — 77386 PR IMRT TREATMENT DELIVERY, COMPLEX: CPT | Performed by: RADIOLOGY

## 2023-04-11 PROCEDURE — 77014 PR CT GUIDE FOR PLACEMENT RADIATION THERAPY FIELDS: CPT | Performed by: RADIOLOGY

## 2023-04-11 NOTE — PROGRESS NOTES
"  [facility]  Return Visit  Encounter date:   April 12, 2023    Chief Complaint:   Post op    ID: s/p second stage surgery as below, left sinonasal adenocarcinoma    Interval History:   Rosalind Murphy is a 53 year old female who presents for follow up. Patient was evaluated on 3/8/23 for second post op by Dr. Hall. At today's visit she is half way (15 doses) through her radiation treatment.     Last week she began to notice further loss of taste and total dryness of food in her mouth. Her smell is relatively diminished at this point with intermittent scents. Her headaches are better than previous. She has been rinsing and moisturizing the nose with saline spray.     Sino-Nasal Outcome Test (SNOT - 22)  DNT    [place] [facility]  Procedure Date: 02/02/2023     PREOPERATIVE DIAGNOSES:    1.  Intestinal type sinonasal adenocarcinoma.  2.  Nasal obstruction.     PREOPERATIVE DIAGNOSES:    1.  Intestinal type sinonasal adenocarcinoma.  2.  Nasal obstruction.     PROCEDURE PERFORMED:     1.  Endoscopic endonasal transcribriform resection of anterior cranial fossa intradural tumor, left sinonasal adenocarcinoma.  2.  [place] of right-sided pedicled nasoseptal flap pedicled off the posterior septal branch of sphenopalatine artery.     SURGEON:  Germain Vyas MD     CO-SURGEON:  Abhi Tidwell MD    Review of systems: A 14-point review of systems has been conducted and is negative for any notable symptoms, except as dictated in the history of present illness.     Physical Exam:  Vital signs: /82 (BP Location: Left arm, Patient Position: Sitting, Cuff Size: Adult Regular)   Pulse 60   Ht 1.727 m (5' 8\")   Wt 87.1 kg (192 lb)   SpO2 98%   BMI 29.19 kg/m     General Appearance: No acute distress, appropriate demeanor, conversant  Eyes: moist conjunctivae; EOMI; pupils symmetric; visual acuity grossly intact; no proptosis  Head: normocephalic; overall symmetric appearance without " deformity  Face: overall symmetric without deformity; HB I/VI  Nose: No external deformity; see endoscopy  Lungs: symmetric chest rise; no wheezing  CV: Good distal perfusion; normal hear rate  Extremities: No deformity  Neurologic Exam: Cranial nerves II-XII are grossly intact; no focal deficit       Procedure Note  Procedure performed: Rigid nasal endoscopy  Indication: To evaluate for sinonasal pathology not visualized on routine anterior rhinoscopy  Anesthesia: 4% topical lidocaine with 0.05 % oxymetazoline  Description of procedure: A 30 degree, 3 mm rigid endoscope was inserted into bilateral nasal cavities and the nasal valves, nasal cavity, middle meatus, sphenoethmoid recess, nasopharynx were evaluated for evidence of obstruction, edema, purulence, polyps and/or mass/lesion.     Jia-Imtiaz Endoscopic Scoring System  Endoscopic observation Right Left   Polyps in middle meatus (0 = absent, 1 = restricted to middle meatus, 2 = Beyond middle meatus) 0 0   Discharge (0 = absent, 1 = thin and clear, 2 = thick, purulent) 0 0   Edema (0 = absent, 1 = mild-moderate, 2 = moderate-severe) 0 0   Crusting (0 = absent, 1 = mild-moderate, 2 = moderate-severe) 0 1   Scarring (0= absent, 1 = mild-moderate, 2 = moderate-severe) 0 0   Total 0 1     Findings  RT: septal donor site well healing;   LT: crusting of the anterior nasal cavity; septal donor flap well healed at base of the skull; frontal sinus is patent     The patient tolerated the procedure well without complication.     Laboratory Review:  n/a     Imaging Review:  CT sinus 12/15/2022:  1.  The left nasal cavity is opacified along its mid to posterior   aspect with extension of soft tissue into the left posterior   nasopharynx. There is questionable remodeling of the posterolateral   wall of the left nasal cavity. Is an underlying lesion at this   location is possible and correlation with direct visualization is   advised.        MRI skull base w and w/o contrast  1/2/2023  IMPRESSION: 3.2 x 1.4 x 3.1 cm left posterior nasal cavity mass  compatible with pathology proven adenocarcinoma..     PET/CT 1/3/2023:  IMPRESSION: In this patient with biopsy-proven left nasal cavity  adenocarcinoma:  1. No evidence of metastasis in the chest, abdomen or pelvis.  2. Please refer to dedicated report for findings in the head and neck  region.     Impression:   1. 2.8 x 2.7 x 1.4 cm hypermetabolic mass in the left posterior nasal  cavity compatible with pathology proven adenocarcinoma.  2. No cervical lymphadenopathy.   3. Please refer to the whole body PET CT performed as a separate  report, for the findings of the remainder of the body.        *I have personally reviewed these images and agree with the radiologist's interpretation*        Pathology Review:  Final Diagnosis 2/2/23  A. LEFT POSTERIOR ETHMOID, EXCISIONAL BIOPSY:  -Benign sinonasal mucosa with chronic inflammation and focal calcifications.  B. LEFT ANTERIOR ETHMOID, EXCISIONAL BIOPSY:  -Benign bone and sinonasal mucosa with chronic inflammation.  C. LEFT LATERAL NASAL WALL, BIOPSY:  -Benign sinonasal mucosa with chronic inflammation  D. RIGHT OLFACTORY CLEFT, BIOPSY:  -Benign sinonasal mucosa with chronic inflammation.  E. LEFT ANTERIOR SEPTAL MARGIN, EXCISION:  -Benign sinonasal mucosa with chronic inflammation.  F. LEFT INFERIOR SEPTAL MARGIN, EXCISION:  -Benign sinonasal mucosa with chronic inflammation  G. LEFT SPHENOID ROSTRUM, EXCISION:  -Benign sinonasal mucosa with chronic inflammation  H. LEFT OLFACTORY CLEFT, BIOPSY:  -Benign fibroconnective tissue and bone.  I. LEFT OLFACTORY CLEFT #2, BIOPSY:  -FOCI OF RESIDUAL INTESTINAL-TYPE ADENOCARCINOMA in fragments of fibroconnective and mucosal tissue with chronic  inflammation.  J. LEFT OLFACTORY #3, BIOPSY:  -Negative for malignancy.  K. POSTERIOR DURAL MARGIN, BIOPSY:  -Scant fragments of fibroconnective tissue, negative for malignancy.  L. ANTERIOR DURAL MARGIN,  BIOPSY:  -Small fragment of fibroconnective and nerve tissue, negative for malignancy.  M. MEDIAL DURAL MARGIN, BIOPSY:  -Small fragment of dense fibrous tissue, negative for malignancy.  N. LATERAL DURAL MARGIN, BIOPSY:  -Small fragment of dense fibrous tissue, negative for malignancy.  O. POSTERIOR NERVE OLFACTORY MARGIN, EXCISION:  -Small fragment of neural type tissue, negative for malignancy.        Final Diagnosis 1/17/23  A. NOSE, LEFT SINONASAL TUMOR, EXCISION:  - SINONASAL ADENOCARCINOMA, INTESTINAL TYPE  - See comment  B. NOSE, LEFT MIDDLE TURBINATE, EXCISION:  - Fragments of benign respiratory mucosa and lamellar bone  - No evidence of malignancy  C. NOSE, LEFT OLFACTORY CLEFT, EXCISION:  - SINONASAL ADENOCARCINOMA, INTESTINAL TYPE        Nasal mass biopsy - 12/23/2022  Final Diagnosis        NASAL MASS, BIOPSY:   1. Moderately-differentiated adenocarcinoma, favor     sinonasal adenocarcinoma, non-intestinal type         Assessment/Medical Decision Making:  Left olfactory cleft ITAC  S/p stage 1 surgery  S/p stage 2 surgery 2/2/23  Nasal obstruction secondary to above  Atypical facial pain    Continued and routine endoscopy with debridement is indicated post-operatively to defend against post-operative surgical site infection, untoward healing, and monitor for early tumor recurrence.         Plan:  Bilateral endoscopy performed today shows mild crusting at the anterior nasal cavity and I will start her on Mupirocin ointment for this. The remainder of the post surgical sinonasal cavity is well healing. I will see her back after radiation treatment.  She will follow up in one month.     Germain Vyas MD    [facility]  Rhinology, Endoscopic Skull Base Surgery  [facility]  Department of Otolaryngology - Head & Neck Surgery    Scribe Disclosure:  I, Eduin Felix, am serving as a scribe to document services personally performed by Germain Vyas MD at this  visit, based upon the provider's statements to me. All documentation has been reviewed by the aforementioned provider prior to being entered into the official medical record.     Additional portions of the patient's history have been reviewed below.   ~~~~~~~~~~~~~~~~~~~~~~~~~~~~~~~~~~~~~~~~~~~~~~~~~~~~~~~~~~~~~~~~~~~~~~~~~~~~~~~~~~~~~~~~~~~~~~~~~~~~~~~~~~~~~~~~~~~~~~~~~~~~~~~~~~~~~~~    Past Medical History:   Diagnosis Date     History of hysterectomy 03/06/2015     Hypothyroidism      Motion sickness      PONV (postoperative nausea and vomiting)      Primary adenocarcinoma of nasal cavity (H) 12/23/2022        Past Surgical History:   Procedure Laterality Date     CATARACT IOL, RT/LT Left     as a child     HYSTERECTOMY  2015     LAPAROTOMY EXPLORATORY  2015     OPTICAL TRACKING SYSTEM ENDOSCOPIC EXCISION SINUS TUMOR Left 1/17/2023    Procedure: EXCISION, NEOPLASM, PARANASAL SINUS, ENDOSCOPIC, USING OPTICAL TRACKING SYSTEM;  Surgeon: Germain Vyas MD;  Location: [facility] OR     OPTICAL TRACKING SYSTEM ENDOSCOPIC EXCISION SINUS TUMOR Bilateral 2/2/2023    Procedure: stealth assisted endoscopic endonasal anterior craniofacial resection, nasoseptal flap;  Surgeon: Germain Vyas MD;  Location: [facility] OR     N[place] BIOPSY NASAL LESION  12/23/2022     PROCURE GRAFT FAT Left 2/2/2023    Procedure: fascia elsy graft, left upper thigh;  Surgeon: Germain Vyas MD;  Location: [facility] OR     RETINAL DETACHMENT SURGERY Left 2019     TONSILLECTOMY      age 6        Family History   Problem Relation Age of Onset     Breast Cancer Mother      Breast Cancer Maternal Aunt      Breast Cancer Maternal Aunt      Lung Cancer Maternal Aunt      Bladder Cancer Maternal Aunt      Cancer Maternal Uncle         Eye        Social History     Socioeconomic History     Marital status:    Tobacco Use     Smoking status: Never     Smokeless tobacco: Never   Substance and Sexual Activity     Alcohol use: Yes     Comment: social use per patient      Drug use: Never

## 2023-04-12 ENCOUNTER — OFFICE VISIT (OUTPATIENT)
Dept: OTOLARYNGOLOGY | Facility: CLINIC | Age: 53
End: 2023-04-12
Payer: COMMERCIAL

## 2023-04-12 ENCOUNTER — OFFICE VISIT (OUTPATIENT)
Dept: RADIATION ONCOLOGY | Facility: CLINIC | Age: 53
End: 2023-04-12
Payer: COMMERCIAL

## 2023-04-12 ENCOUNTER — APPOINTMENT (OUTPATIENT)
Dept: RADIATION ONCOLOGY | Facility: CLINIC | Age: 53
End: 2023-04-12
Payer: COMMERCIAL

## 2023-04-12 VITALS
WEIGHT: 192 LBS | HEART RATE: 60 BPM | SYSTOLIC BLOOD PRESSURE: 123 MMHG | DIASTOLIC BLOOD PRESSURE: 82 MMHG | OXYGEN SATURATION: 98 % | BODY MASS INDEX: 29.1 KG/M2 | HEIGHT: 68 IN

## 2023-04-12 VITALS
SYSTOLIC BLOOD PRESSURE: 116 MMHG | WEIGHT: 187.5 LBS | BODY MASS INDEX: 28.51 KG/M2 | TEMPERATURE: 97.7 F | RESPIRATION RATE: 18 BRPM | HEART RATE: 77 BPM | OXYGEN SATURATION: 97 % | DIASTOLIC BLOOD PRESSURE: 79 MMHG

## 2023-04-12 DIAGNOSIS — G50.1 ATYPICAL FACIAL PAIN: ICD-10-CM

## 2023-04-12 DIAGNOSIS — C79.31 SINUS CANCER WITH INTRACRANIAL EXTENSION (H): ICD-10-CM

## 2023-04-12 DIAGNOSIS — C30.0 PRIMARY ADENOCARCINOMA OF NASAL CAVITY (H): Primary | ICD-10-CM

## 2023-04-12 DIAGNOSIS — C31.9 SINUS CANCER WITH INTRACRANIAL EXTENSION (H): ICD-10-CM

## 2023-04-12 DIAGNOSIS — C80.1 ADENOCARCINOMA (H): ICD-10-CM

## 2023-04-12 DIAGNOSIS — J34.89 NASAL OBSTRUCTION: Primary | ICD-10-CM

## 2023-04-12 PROCEDURE — 99207 PR DROP WITH A PROCEDURE: CPT | Performed by: SURGERY

## 2023-04-12 PROCEDURE — 77014 PR CT GUIDE FOR PLACEMENT RADIATION THERAPY FIELDS: CPT | Performed by: SURGERY

## 2023-04-12 PROCEDURE — 31231 NASAL ENDOSCOPY DX: CPT | Mod: 79 | Performed by: OTOLARYNGOLOGY

## 2023-04-12 PROCEDURE — 77386 PR IMRT TREATMENT DELIVERY, COMPLEX: CPT | Performed by: SURGERY

## 2023-04-12 PROCEDURE — 77336 RADIATION PHYSICS CONSULT: CPT | Performed by: SURGERY

## 2023-04-12 PROCEDURE — 77427 RADIATION TX MANAGEMENT X5: CPT | Performed by: SURGERY

## 2023-04-12 RX ORDER — MUPIROCIN 20 MG/G
OINTMENT TOPICAL
Qty: 30 G | Refills: 1 | Status: SHIPPED | OUTPATIENT
Start: 2023-04-12 | End: 2023-08-18

## 2023-04-12 ASSESSMENT — PAIN SCALES - GENERAL
PAINLEVEL: NO PAIN (1)
PAINLEVEL: NO PAIN (0)

## 2023-04-12 NOTE — PROGRESS NOTES
[facility] PHYSICIANS  SPECIALIZING IN BREAKTHROUGHS  Radiation Oncology    On Treatment Visit Note      Rosalind Murphy      Date: 2023   MRN: [id number]   : 1970  Diagnosis: left sinonasal adenocarcinoma        ID: Ms. 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 Recommendation is for 60Gy/30fx without chemotherapy. We did discussed the role of elective david irradiation, particularly given location of tumor and stage.     Reason for Visit:  On Radiation Treatment Visit       Treatment Summary to Date  Treatment Site: sinonasal Current Dose: 3000/6000 cGy Fractions: 15/30      Chemotherapy  Chemo concurrent with radx?: No    Subjective:   Mild Has, stable. Nausea has improved. On Dex 2mg BID. Still working. Altered taste and lack of smell, lack of appetite, 3-5 lbs weight loss.     Pain Control: Gabapentin 600mg, 600mg, 900 mg  Fluid Intake: Adequapate  Nutrition: PO  Rinses: 10-15 x per day  Skin care: Aquaphor BID  Chemotherapy: No chemotherapy     Nursing ROS:   Nutrition Alteration  Diet Type: Patient's Preference  Skin  Skin Reaction: 0 - No changes  Skin Intervention: patient reports applying Aquaphor three times daily  Skin Note: reviewed sun protection  CNS Alteration  CNS note: patient reports intermittent frontal headaches, currently taking Decadron 2 mg po BID  ENT and Mouth Exam  ENT/Mouth Note: patient reports rinsing with baking soda and salt rinse 10-15 times per day, reports dry mouth and thick saliva, using cool mist humidifier at night, reports using dry mouth lozenges as needed, reports taste changes over the past one week with limited oral intake, patient reports intermittent loss  of smell     Gastrointestinal  Nausea: 0 - None  GI Note: patient reports nausea has resolved with starting Decadron 2 mg po BID     Psychosocial  Mood - Anxiety: 0 - Normal  Mood - Depression: 0 - Normal  Psychosocial Note: fatigue at baseline, intermittent fatigue this past week  Pain Assessment  0-10 Pain Scale: 1  Pain Descriptors: Headache  Pain Treatment: Decadron 2 mg po BID      Objective:   /79 (BP Location: Left arm, Patient Position: Chair, Cuff Size: Adult Regular)   Pulse 77   Temp 97.7  F (36.5  C) (Oral)   Resp 18   Wt 85 kg (187 lb 8 oz)   SpO2 97%   BMI 28.51 kg/m    Gen: Appears well, in no acute distress  Skin: mild erythema, no skin breakdown  CV/Resp: rrr, breathing comfortably on room air  Neuro: CN 2-12 grossly intact, UE/LE full strength     Labs:  CBC RESULTS: Recent Labs   Lab Test 02/03/23  0738   WBC 10.2   RBC 3.99   HGB 12.2   HCT 37.3   MCV 94   MCH 30.6   MCHC 32.7   RDW 11.9        ELECTROLYTES:  Recent Labs   Lab Test 02/05/23  0834 02/04/23  0608 02/03/23  0738   NA  --   --  140   POTASSIUM  --   --  4.1   CHLORIDE  --   --  104   NERY  --   --  8.6   CO2  --   --  26   BUN  --   --  8.7   CR  --   --  0.74   GLC 89   < > 118*    < > = values in this interval not displayed.       Assessment:    Tolerating radiation therapy well.  All questions and concerns addressed.      Plan:   1. Continue current therapy.    2. Continue gabapentin, rinses, nutrition, hydration, skin care  3. Re-discussed starting dex 2 mg BID with GI ppx      Mosaiq chart and setup information reviewed  Ports checked and MVCT/IGRT images checked    Medication Review  Med list reviewed with patient?: Yes  Med list printed and given: Offered and declined  Med Note: patient taking Gabapentin po 600 mg at breakfast, 600 mg at lunch and 900 mg at dinner    Educational Topic Discussed  Education Instructions: radiation therapy side effects: fatigue, skin changes and skin cares, dry mouth and thick  saliva, mouth and throat sores, taste/smell changes, pain/difficulty with swallowing    Jules Pereira MD  Radiation Oncology   [facility]: [phone]

## 2023-04-12 NOTE — LETTER
4/12/2023       RE: Rosalind Murphy  [address] [facility] [address]     Dear Colleague,    Thank you for referring your patient, Rosalind Murphy, to the [facility] EAR [facility]SE AND THROAT [facility]IN[facility] [place] [facility]. Please see a copy of my visit note below.      [facility]  Return Visit  Encounter date:   April 12, 2023    Chief Complaint:   Post op    ID: s/p second stage surgery as below, left sinonasal adenocarcinoma    Interval History:   Rosalind Murphy is a 53 year old female who presents for follow up. Patient was evaluated on 3/8/23 for second post op by Dr. Hall. At today's visit she is half way (15 doses) through her radiation treatment.     Last week she began to notice further loss of taste and total dryness of food in her mouth. Her smell is relatively diminished at this point with intermittent scents. Her headaches are better than previous. She has been rinsing and moisturizing the nose with saline spray.     Sino-Nasal Outcome Test (SNOT - 22)  [facility] Operative History  Procedure Date: 02/02/2023     PREOPERATIVE DIAGNOSES:    1.  Intestinal type sinonasal adenocarcinoma.  2.  Nasal obstruction.     PREOPERATIVE DIAGNOSES:    1.  Intestinal type sinonasal adenocarcinoma.  2.  Nasal obstruction.     PROCEDURE PERFORMED:     1.  Endoscopic endonasal transcribriform resection of anterior cranial fossa intradural tumor, left sinonasal adenocarcinoma.  2.  [place] of right-sided pedicled nasoseptal flap pedicled off the posterior septal branch of sphenopalatine artery.     SURGEON:  Germain Vyas MD     CO-SURGEON:  Abih Tidwell MD    Review of systems: A 14-point review of systems has been conducted and is negative for any notable symptoms, except as dictated in the history of present illness.     Physical Exam:  Vital signs: /82 (BP Location: Left arm, Patient Position: Sitting, Cuff Size: Adult  "Regular)   Pulse 60   Ht 1.727 m (5' 8\")   Wt 87.1 kg (192 lb)   SpO2 98%   BMI 29.19 kg/m     General Appearance: No acute distress, appropriate demeanor, conversant  Eyes: moist conjunctivae; EOMI; pupils symmetric; visual acuity grossly intact; no proptosis  Head: normocephalic; overall symmetric appearance without deformity  Face: overall symmetric without deformity; HB I/VI  Nose: No external deformity; see endoscopy  Lungs: symmetric chest rise; no wheezing  CV: Good distal perfusion; normal hear rate  Extremities: No deformity  Neurologic Exam: Cranial nerves II-XII are grossly intact; no focal deficit       Procedure Note  Procedure performed: Rigid nasal endoscopy  Indication: To evaluate for sinonasal pathology not visualized on routine anterior rhinoscopy  Anesthesia: 4% topical lidocaine with 0.05 % oxymetazoline  Description of procedure: A 30 degree, 3 mm rigid endoscope was inserted into bilateral nasal cavities and the nasal valves, nasal cavity, middle meatus, sphenoethmoid recess, nasopharynx were evaluated for evidence of obstruction, edema, purulence, polyps and/or mass/lesion.     Jia-Imtiaz Endoscopic Scoring System  Endoscopic observation Right Left   Polyps in middle meatus (0 = absent, 1 = restricted to middle meatus, 2 = Beyond middle meatus) 0 0   Discharge (0 = absent, 1 = thin and clear, 2 = thick, purulent) 0 0   Edema (0 = absent, 1 = mild-moderate, 2 = moderate-severe) 0 0   Crusting (0 = absent, 1 = mild-moderate, 2 = moderate-severe) 0 1   Scarring (0= absent, 1 = mild-moderate, 2 = moderate-severe) 0 0   Total 0 1     Findings  RT: septal donor site well healing;   LT: crusting of the anterior nasal cavity; septal donor flap well healed at base of the skull; frontal sinus is patent     The patient tolerated the procedure well without complication.     Laboratory Review:  n/a     Imaging Review:  CT sinus 12/15/2022:  1.  The left nasal cavity is opacified along its mid to " posterior   aspect with extension of soft tissue into the left posterior   nasopharynx. There is questionable remodeling of the posterolateral   wall of the left nasal cavity. Is an underlying lesion at this   location is possible and correlation with direct visualization is   advised.        MRI skull base w and w/o contrast 1/2/2023  IMPRESSION: 3.2 x 1.4 x 3.1 cm left posterior nasal cavity mass  compatible with pathology proven adenocarcinoma..     PET/CT 1/3/2023:  IMPRESSION: In this patient with biopsy-proven left nasal cavity  adenocarcinoma:  1. No evidence of metastasis in the chest, abdomen or pelvis.  2. Please refer to dedicated report for findings in the head and neck  region.     Impression:   1. 2.8 x 2.7 x 1.4 cm hypermetabolic mass in the left posterior nasal  cavity compatible with pathology proven adenocarcinoma.  2. No cervical lymphadenopathy.   3. Please refer to the whole body PET CT performed as a separate  report, for the findings of the remainder of the body.        *I have personally reviewed these images and agree with the radiologist's interpretation*        Pathology Review:  Final Diagnosis 2/2/23  A. LEFT POSTERIOR ETHMOID, EXCISIONAL BIOPSY:  -Benign sinonasal mucosa with chronic inflammation and focal calcifications.  B. LEFT ANTERIOR ETHMOID, EXCISIONAL BIOPSY:  -Benign bone and sinonasal mucosa with chronic inflammation.  C. LEFT LATERAL NASAL WALL, BIOPSY:  -Benign sinonasal mucosa with chronic inflammation  D. RIGHT OLFACTORY CLEFT, BIOPSY:  -Benign sinonasal mucosa with chronic inflammation.  E. LEFT ANTERIOR SEPTAL MARGIN, EXCISION:  -Benign sinonasal mucosa with chronic inflammation.  F. LEFT INFERIOR SEPTAL MARGIN, EXCISION:  -Benign sinonasal mucosa with chronic inflammation  G. LEFT SPHENOID ROSTRUM, EXCISION:  -Benign sinonasal mucosa with chronic inflammation  H. LEFT OLFACTORY CLEFT, BIOPSY:  -Benign fibroconnective tissue and bone.  I. LEFT OLFACTORY CLEFT #2,  BIOPSY:  -FOCI OF RESIDUAL INTESTINAL-TYPE ADENOCARCINOMA in fragments of fibroconnective and mucosal tissue with chronic  inflammation.  J. LEFT OLFACTORY #3, BIOPSY:  -Negative for malignancy.  K. POSTERIOR DURAL MARGIN, BIOPSY:  -Scant fragments of fibroconnective tissue, negative for malignancy.  L. ANTERIOR DURAL MARGIN, BIOPSY:  -Small fragment of fibroconnective and nerve tissue, negative for malignancy.  M. MEDIAL DURAL MARGIN, BIOPSY:  -Small fragment of dense fibrous tissue, negative for malignancy.  N. LATERAL DURAL MARGIN, BIOPSY:  -Small fragment of dense fibrous tissue, negative for malignancy.  O. POSTERIOR NERVE OLFACTORY MARGIN, EXCISION:  -Small fragment of neural type tissue, negative for malignancy.        Final Diagnosis 1/17/23  A. NOSE, LEFT SINONASAL TUMOR, EXCISION:  - SINONASAL ADENOCARCINOMA, INTESTINAL TYPE  - See comment  B. NOSE, LEFT MIDDLE TURBINATE, EXCISION:  - Fragments of benign respiratory mucosa and lamellar bone  - No evidence of malignancy  C. NOSE, LEFT OLFACTORY CLEFT, EXCISION:  - SINONASAL ADENOCARCINOMA, INTESTINAL TYPE        Nasal mass biopsy - 12/23/2022  Final Diagnosis        NASAL MASS, BIOPSY:   1. Moderately-differentiated adenocarcinoma, favor     sinonasal adenocarcinoma, non-intestinal type         Assessment/Medical Decision Making:  Left olfactory cleft ITAC  S/p stage 1 surgery  S/p stage 2 surgery 2/2/23  Nasal obstruction secondary to above  Atypical facial pain    Continued and routine endoscopy with debridement is indicated post-operatively to defend against post-operative surgical site infection, untoward healing, and monitor for early tumor recurrence.         Plan:  Bilateral endoscopy performed today shows mild crusting at the anterior nasal cavity and I will start her on Mupirocin ointment for this. The remainder of the post surgical sinonasal cavity is well healing. I will see her back after radiation treatment.  She will follow up in one month.      Germain Vyas MD    [facility]  Rhinology, Endoscopic Skull Base Surgery  [facility]  Department of Otolaryngology - Head & Neck Surgery    Scribe Disclosure:  I, Eduin Felix, am serving as a scribe to document services personally performed by Germain Vyas MD at this visit, based upon the provider's statements to me. All documentation has been reviewed by the aforementioned provider prior to being entered into the official medical record.     Additional portions of the patient's history have been reviewed below.   ~~~~~~~~~~~~~~~~~~~~~~~~~~~~~~~~~~~~~~~~~~~~~~~~~~~~~~~~~~~~~~~~~~~~~~~~~~~~~~~~~~~~~~~~~~~~~~~~~~~~~~~~~~~~~~~~~~~~~~~~~~~~~~~~~~~~~~~    Past Medical History:   Diagnosis Date    History of hysterectomy 03/06/2015    Hypothyroidism     Motion sickness     PONV (postoperative nausea and vomiting)     Primary adenocarcinoma of nasal cavity (H) 12/23/2022        Past Surgical History:   Procedure Laterality Date    CATARACT IOL, RT/LT Left     as a child    HYSTERECTOMY  2015    LAPAROTOMY EXPLORATORY  2015    OPTICAL TRACKING SYSTEM ENDOSCOPIC EXCISION SINUS TUMOR Left 1/17/2023    Procedure: EXCISION, NEOPLASM, PARANASAL SINUS, ENDOSCOPIC, USING OPTICAL TRACKING SYSTEM;  Surgeon: Germain Vyas MD;  Location: [facility] OR    OPTICAL TRACKING SYSTEM ENDOSCOPIC EXCISION SINUS TUMOR Bilateral 2/2/2023    Procedure: stealth assisted endoscopic endonasal anterior craniofacial resection, nasoseptal flap;  Surgeon: Germain Vyas MD;  Location: [facility] OR    KS BIOPSY NASAL LESION  12/23/2022    PROCURE GRAFT FAT Left 2/2/2023    Procedure: fascia elsy graft, left upper thigh;  Surgeon: Germain Vyas MD;  Location: [facility] OR    RETINAL DETACHMENT SURGERY Left 2019    TONSILLECTOMY      age 6        Family History   Problem Relation Age of Onset    Breast Cancer Mother     Breast Cancer Maternal Aunt     Breast Cancer Maternal Aunt     Lung Cancer Maternal  Aunt     Bladder Cancer Maternal Aunt     Cancer Maternal Uncle         Eye        Social History     Socioeconomic History    Marital status:    Tobacco Use    Smoking status: Never    Smokeless tobacco: Never   Substance and Sexual Activity    Alcohol use: Yes     Comment: social use per patient    Drug use: Never           Again, thank you for allowing me to participate in the care of your patient.      Sincerely,    Germain Vyas MD

## 2023-04-12 NOTE — LETTER
2023         RE: Rosalind Murphy  [address] [address] [address]        Dear Colleague,    Thank you for referring your patient, Rosalind Murphy, to the [facility] RADIATION ONCOLOGY [place]. Please see a copy of my visit note below.    [facility] PHYSICIANS  SPECIALIZING IN BREAKTHROUGHS  Radiation Oncology    On Treatment Visit Note      Rosalind Murphy      Date: 2023   MRN: [id number]   : 1970  Diagnosis: left sinonasal adenocarcinoma        ID: Ms. 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 Recommendation is for 60Gy/30fx without chemotherapy. We did discussed the role of elective david irradiation, particularly given location of tumor and stage.     Reason for Visit:  On Radiation Treatment Visit       Treatment Summary to Date  Treatment Site: sinonasal Current Dose: 3000/6000 cGy Fractions: 15      Chemotherapy  Chemo concurrent with radx?: No    Subjective:   Mild Has, stable. Nausea has improved. On Dex 2mg BID. Still working. Altered taste and lack of smell, lack of appetite, 3-5 lbs weight loss.     Pain Control: Gabapentin 600mg, 600mg, 900 mg  Fluid Intake: Adequapate  Nutrition: PO  Rinses: 10-15 x per day  Skin care: Aquaphor BID  Chemotherapy: No chemotherapy     Nursing ROS:   Nutrition Alteration  Diet Type: Patient's Preference  Skin  Skin Reaction: 0 - No changes  Skin Intervention: patient reports applying Aquaphor three times daily  Skin Note: reviewed sun protection  CNS Alteration  CNS note: patient reports intermittent frontal headaches, currently taking Decadron 2 mg po BID  ENT and Mouth Exam  ENT/Mouth Note: patient reports rinsing with baking  soda and salt rinse 10-15 times per day, reports dry mouth and thick saliva, using cool mist humidifier at night, reports using dry mouth lozenges as needed, reports taste changes over the past one week with limited oral intake, patient reports intermittent loss of smell     Gastrointestinal  Nausea: 0 - None  GI Note: patient reports nausea has resolved with starting Decadron 2 mg po BID     Psychosocial  Mood - Anxiety: 0 - Normal  Mood - Depression: 0 - Normal  Psychosocial Note: fatigue at baseline, intermittent fatigue this past week  Pain Assessment  0-10 Pain Scale: 1  Pain Descriptors: Headache  Pain Treatment: Decadron 2 mg po BID      Objective:   /79 (BP Location: Left arm, Patient Position: Chair, Cuff Size: Adult Regular)   Pulse 77   Temp 97.7  F (36.5  C) (Oral)   Resp 18   Wt 85 kg (187 lb 8 oz)   SpO2 97%   BMI 28.51 kg/m    Gen: Appears well, in no acute distress  Skin: mild erythema, no skin breakdown  CV/Resp: rrr, breathing comfortably on room air  Neuro: CN 2-12 grossly intact, UE/LE full strength     Labs:  CBC RESULTS: Recent Labs   Lab Test 02/03/23  0738   WBC 10.2   RBC 3.99   HGB 12.2   HCT 37.3   MCV 94   MCH 30.6   MCHC 32.7   RDW 11.9        ELECTROLYTES:  Recent Labs   Lab Test 02/05/23  0834 02/04/23  0608 02/03/23  0738   NA  --   --  140   POTASSIUM  --   --  4.1   CHLORIDE  --   --  104   NERY  --   --  8.6   CO2  --   --  26   BUN  --   --  8.7   CR  --   --  0.74   GLC 89   < > 118*    < > = values in this interval not displayed.       Assessment:    Tolerating radiation therapy well.  All questions and concerns addressed.      Plan:   1. Continue current therapy.    2. Continue gabapentin, rinses, nutrition, hydration, skin care  3. Re-discussed starting dex 2 mg BID with GI ppx      Mosaiq chart and setup information reviewed  Ports checked and MVCT/IGRT images checked    Medication Review  Med list reviewed with patient?: Yes  Med list printed and given:  Offered and declined  Med Note: patient taking Gabapentin po 600 mg at breakfast, 600 mg at lunch and 900 mg at dinner    Educational Topic Discussed  Education Instructions: radiation therapy side effects: fatigue, skin changes and skin cares, dry mouth and thick saliva, mouth and throat sores, taste/smell changes, pain/difficulty with swallowing    Jules Pereira MD  Radiation Oncology   [facility]: [phone]          Again, thank you for allowing me to participate in the care of your patient.        Sincerely,        Jules Pereira MD

## 2023-04-12 NOTE — PATIENT INSTRUCTIONS
Please contact [facility] RN with questions or concerns following today's appointment: [phone].    Thank you!

## 2023-04-12 NOTE — PATIENT INSTRUCTIONS
You were seen in the ENT Clinic today by Dr. Vyas. If you have any questions or concerns after your appointment, please contact us (see below)      2.   The following recommendations have been made based upon your appointment today:   -Continue sinus rinses 1-2 times daily.   -Mupirocin ointment: Please apply this to the inside of both nostrils twice daily with a cotton swab for 2 weeks. The prescription has been sent to your preferred pharmacy.    3.   Please return to the ENT clinic in 1 month.           How to Contact Us:  Send a UUSEE message to your provider. Our team will respond to you via UUSEE. Occasionally, we will need to call you to get further information.  For urgent matters (Monday-Friday), call the ENT Clinic: [phone] and speak with a call center team member - they will route your call appropriately.   If you'd like to speak directly with a nurse, please find our contact information below. We do our best to check voicemail frequently throughout the day, and will work to call you back within 1-2 days. For urgent matters, please use the general clinic phone numbers listed above.        Syeda VALADEZ RN  ENT RN Care Coordinator  Direct: [phone]  Karla PIMENTEL LPN  Direct: [phone]         [facility]  Department of Otolaryngology

## 2023-04-13 ENCOUNTER — APPOINTMENT (OUTPATIENT)
Dept: RADIATION ONCOLOGY | Facility: CLINIC | Age: 53
End: 2023-04-13
Payer: COMMERCIAL

## 2023-04-13 PROCEDURE — 77014 PR CT GUIDE FOR PLACEMENT RADIATION THERAPY FIELDS: CPT | Performed by: SURGERY

## 2023-04-13 PROCEDURE — 77386 PR IMRT TREATMENT DELIVERY, COMPLEX: CPT | Performed by: SURGERY

## 2023-04-14 ENCOUNTER — APPOINTMENT (OUTPATIENT)
Dept: RADIATION ONCOLOGY | Facility: CLINIC | Age: 53
End: 2023-04-14
Payer: COMMERCIAL

## 2023-04-14 PROCEDURE — 77014 PR CT GUIDE FOR PLACEMENT RADIATION THERAPY FIELDS: CPT | Performed by: SURGERY

## 2023-04-14 PROCEDURE — 77386 PR IMRT TREATMENT DELIVERY, COMPLEX: CPT | Performed by: SURGERY

## 2023-04-17 ENCOUNTER — APPOINTMENT (OUTPATIENT)
Dept: RADIATION ONCOLOGY | Facility: CLINIC | Age: 53
End: 2023-04-17
Payer: COMMERCIAL

## 2023-04-17 PROCEDURE — 77386 PR IMRT TREATMENT DELIVERY, COMPLEX: CPT | Performed by: RADIOLOGY

## 2023-04-17 PROCEDURE — 77014 PR CT GUIDE FOR PLACEMENT RADIATION THERAPY FIELDS: CPT | Performed by: RADIOLOGY

## 2023-04-18 ENCOUNTER — APPOINTMENT (OUTPATIENT)
Dept: RADIATION ONCOLOGY | Facility: CLINIC | Age: 53
End: 2023-04-18
Payer: COMMERCIAL

## 2023-04-18 PROCEDURE — 77014 PR CT GUIDE FOR PLACEMENT RADIATION THERAPY FIELDS: CPT | Performed by: RADIOLOGY

## 2023-04-18 PROCEDURE — 77386 PR IMRT TREATMENT DELIVERY, COMPLEX: CPT | Performed by: RADIOLOGY

## 2023-04-19 ENCOUNTER — OFFICE VISIT (OUTPATIENT)
Dept: RADIATION ONCOLOGY | Facility: CLINIC | Age: 53
End: 2023-04-19
Payer: COMMERCIAL

## 2023-04-19 ENCOUNTER — APPOINTMENT (OUTPATIENT)
Dept: RADIATION ONCOLOGY | Facility: CLINIC | Age: 53
End: 2023-04-19
Payer: COMMERCIAL

## 2023-04-19 VITALS
TEMPERATURE: 97.6 F | HEART RATE: 58 BPM | DIASTOLIC BLOOD PRESSURE: 85 MMHG | RESPIRATION RATE: 18 BRPM | WEIGHT: 187 LBS | SYSTOLIC BLOOD PRESSURE: 138 MMHG | BODY MASS INDEX: 28.43 KG/M2 | OXYGEN SATURATION: 99 %

## 2023-04-19 DIAGNOSIS — C30.0 PRIMARY ADENOCARCINOMA OF NASAL CAVITY (H): Primary | ICD-10-CM

## 2023-04-19 PROCEDURE — 77427 RADIATION TX MANAGEMENT X5: CPT | Performed by: SURGERY

## 2023-04-19 PROCEDURE — 77386 PR IMRT TREATMENT DELIVERY, COMPLEX: CPT | Performed by: SURGERY

## 2023-04-19 PROCEDURE — 77336 RADIATION PHYSICS CONSULT: CPT | Performed by: SURGERY

## 2023-04-19 PROCEDURE — 77014 PR CT GUIDE FOR PLACEMENT RADIATION THERAPY FIELDS: CPT | Performed by: SURGERY

## 2023-04-19 PROCEDURE — 99207 PR DROP WITH A PROCEDURE: CPT | Performed by: SURGERY

## 2023-04-19 ASSESSMENT — PAIN SCALES - GENERAL: PAINLEVEL: NO PAIN (0)

## 2023-04-19 NOTE — PROGRESS NOTES
[facility] PHYSICIANS  SPECIALIZING IN BREAKTHROUGHS  Radiation Oncology    On Treatment Visit Note      Rosalind Murphy      Date: 2023   MRN: [id number]   : 1970  Diagnosis: left sinonasal adenocarcinoma        ID:  Ms. 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 Recommendation is for 60Gy/30fx without chemotherapy. We did discussed the role of elective david irradiation, particularly given location of tumor and stage.     Reason for Visit:  On Radiation Treatment Visit       Treatment Summary to Date  Treatment Site: sinonasal Current Dose: 4000/6000 cGy Fractions: 20/30      Chemotherapy  Chemo concurrent with radx?: No    Subjective:  Mild HAs, stable. On Dex 2mg BID. Still working. Altered taste and lack of smell, lack of appetite.      Pain Control: Gabapentin 600 mg, 600 mg, 900mg  Fluid Intake: Adequate  Nutrition: PO  Rinses: 10-15x  Skin care: Aquaphor  Chemotherapy: No chemo    Nursing ROS:   Nutrition Alteration  Diet Type: Patient's Preference  Skin  Skin Reaction: 1 - Faint erythema or dry desquamation (no desquamation)  Skin Intervention: patient reports applying Aquaphor three times daily  Skin Note: reviewed sun protection  CNS Alteration  CNS note: patient reports intermittent frontal headaches, currently taking Decadron 2 mg po BID, patient reports intermittent balance disturbances on Friday and Saturday of this past week  ENT and Mouth Exam  ENT/Mouth Note: patient reports rinsing with baking soda and salt rinse 10-15 times per day, reports dry mouth and thick saliva, using cool mist humidifier at night, reports using dry mouth lozenges as needed, reports taste changes over the  past one week with limited oral intake, patient reports intermittent loss of smell     Gastrointestinal  Nausea: 0 - None  GI Note: patient reports nausea has resolved with starting Decadron 2 mg po BID     Psychosocial  Mood - Anxiety: 0 - Normal  Mood - Depression: 0 - Normal  Psychosocial Note: fatigue at baseline, intermittent fatigue this past week  Pain Assessment  0-10 Pain Scale: 0  Pain Treatment: Decadron 2 mg po BID      Objective:   /85 (BP Location: Left arm, Patient Position: Chair, Cuff Size: Adult Regular)   Pulse 58   Temp 97.6  F (36.4  C) (Oral)   Resp 18   Wt 84.8 kg (187 lb)   SpO2 99%   BMI 28.43 kg/m    Gen: Appears well, in no acute distress  Skin: mild erythema, no skin breakdown  CV/Resp: rrr, breathing comfortably on room air  Neuro: CN 2-12 grossly intact, UE/LE full strength     Labs:  CBC RESULTS: Recent Labs   Lab Test 02/03/23  0738   WBC 10.2   RBC 3.99   HGB 12.2   HCT 37.3   MCV 94   MCH 30.6   MCHC 32.7   RDW 11.9        ELECTROLYTES:  Recent Labs   Lab Test 02/05/23  0834 02/04/23  0608 02/03/23  0738   NA  --   --  140   POTASSIUM  --   --  4.1   CHLORIDE  --   --  104   NERY  --   --  8.6   CO2  --   --  26   BUN  --   --  8.7   CR  --   --  0.74   GLC 89   < > 118*    < > = values in this interval not displayed.       Assessment:    Tolerating radiation therapy well.  All questions and concerns addressed.      Plan:   1. Continue current therapy.    2. Continue gabapentin, rinses, nutrition, hydration, skin care      Mosaiq chart and setup information reviewed  Ports checked and MVCT/IGRT images checked    Medication Review  Med list reviewed with patient?: Yes  Med list printed and given: Offered and declined  Med Note: patient taking Gabapentin po 600 mg at breakfast, 600 mg at lunch and 600 mg at dinner    Educational Topic Discussed  Education Instructions: radiation therapy side effects: fatigue, skin changes and skin cares, dry mouth and thick saliva,  mouth and throat sores, taste/smell changes, pain/difficulty with swallowing    Jules Pereira MD  Radiation Oncology   [facility]: [phone]

## 2023-04-19 NOTE — PATIENT INSTRUCTIONS
Please contact [facility] RN with questions or concerns following today's appointment: [phone].   Please leave a detailed message if your call is not answered.      If your call is not received before 3:00 PM, it may not be returned until the following business day.    If you are receiving radiation treatment and need assistance after 3:00 PM or on the weekends, please call [phone] and ask to speak to the radiation oncologist on-call.    Thank you!    Darline BRANCH

## 2023-04-19 NOTE — LETTER
2023         RE: Rsoalind Murphy  [address] [facility] [address]        Dear Colleague,    Thank you for referring your patient, Rosalind Murphy, to the [facility] RADIATION ONCOLOGY [place]. Please see a copy of my visit note below.    [facility] PHYSICIANS  SPECIALIZING IN BREAKTHROUGHS  Radiation Oncology    On Treatment Visit Note      Rosalind Murphy      Date: 2023   MRN: [id number]   : 1970  Diagnosis: left sinonasal adenocarcinoma        ID:  Ms. 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 Recommendation is for 60Gy/30fx without chemotherapy. We did discussed the role of elective david irradiation, particularly given location of tumor and stage.     Reason for Visit:  On Radiation Treatment Visit       Treatment Summary to Date  Treatment Site: sinonasal Current Dose: 4000/6000 cGy Fractions: 20/30      Chemotherapy  Chemo concurrent with radx?: No    Subjective:  Mild HAs, stable. On Dex 2mg BID. Still working. Altered taste and lack of smell, lack of appetite.      Pain Control: Gabapentin 600 mg, 600 mg, 900mg  Fluid Intake: Adequate  Nutrition: PO  Rinses: 10-15x  Skin care: Aquaphor  Chemotherapy: No chemo    Nursing ROS:   Nutrition Alteration  Diet Type: Patient's Preference  Skin  Skin Reaction: 1 - Faint erythema or dry desquamation (no desquamation)  Skin Intervention: patient reports applying Aquaphor three times daily  Skin Note: reviewed sun protection  CNS Alteration  CNS note: patient reports intermittent frontal headaches, currently taking Decadron 2 mg po BID, patient reports intermittent balance disturbances on Friday and Saturday of this past week  ENT  and Mouth Exam  ENT/Mouth Note: patient reports rinsing with baking soda and salt rinse 10-15 times per day, reports dry mouth and thick saliva, using cool mist humidifier at night, reports using dry mouth lozenges as needed, reports taste changes over the past one week with limited oral intake, patient reports intermittent loss of smell     Gastrointestinal  Nausea: 0 - None  GI Note: patient reports nausea has resolved with starting Decadron 2 mg po BID     Psychosocial  Mood - Anxiety: 0 - Normal  Mood - Depression: 0 - Normal  Psychosocial Note: fatigue at baseline, intermittent fatigue this past week  Pain Assessment  0-10 Pain Scale: 0  Pain Treatment: Decadron 2 mg po BID      Objective:   /85 (BP Location: Left arm, Patient Position: Chair, Cuff Size: Adult Regular)   Pulse 58   Temp 97.6  F (36.4  C) (Oral)   Resp 18   Wt 84.8 kg (187 lb)   SpO2 99%   BMI 28.43 kg/m    Gen: Appears well, in no acute distress  Skin: mild erythema, no skin breakdown  CV/Resp: rrr, breathing comfortably on room air  Neuro: CN 2-12 grossly intact, UE/LE full strength     Labs:  CBC RESULTS: Recent Labs   Lab Test 02/03/23  0738   WBC 10.2   RBC 3.99   HGB 12.2   HCT 37.3   MCV 94   MCH 30.6   MCHC 32.7   RDW 11.9        ELECTROLYTES:  Recent Labs   Lab Test 02/05/23  0834 02/04/23  0608 02/03/23  0738   NA  --   --  140   POTASSIUM  --   --  4.1   CHLORIDE  --   --  104   NERY  --   --  8.6   CO2  --   --  26   BUN  --   --  8.7   CR  --   --  0.74   GLC 89   < > 118*    < > = values in this interval not displayed.       Assessment:    Tolerating radiation therapy well.  All questions and concerns addressed.      Plan:   1. Continue current therapy.    2. Continue gabapentin, rinses, nutrition, hydration, skin care      Mosaiq chart and setup information reviewed  Ports checked and MVCT/IGRT images checked    Medication Review  Med list reviewed with patient?: Yes  Med list printed and given: Offered and  declined  Med Note: patient taking Gabapentin po 600 mg at breakfast, 600 mg at lunch and 600 mg at dinner    Educational Topic Discussed  Education Instructions: radiation therapy side effects: fatigue, skin changes and skin cares, dry mouth and thick saliva, mouth and throat sores, taste/smell changes, pain/difficulty with swallowing    Jules Pereira MD  Radiation Oncology   [facility]: [phone]          Again, thank you for allowing me to participate in the care of your patient.        Sincerely,        Jules Pereira MD

## 2023-04-20 ENCOUNTER — APPOINTMENT (OUTPATIENT)
Dept: RADIATION ONCOLOGY | Facility: CLINIC | Age: 53
End: 2023-04-20
Payer: COMMERCIAL

## 2023-04-20 PROCEDURE — 77014 PR CT GUIDE FOR PLACEMENT RADIATION THERAPY FIELDS: CPT | Performed by: SURGERY

## 2023-04-20 PROCEDURE — 77386 PR IMRT TREATMENT DELIVERY, COMPLEX: CPT | Performed by: SURGERY

## 2023-04-21 ENCOUNTER — APPOINTMENT (OUTPATIENT)
Dept: RADIATION ONCOLOGY | Facility: CLINIC | Age: 53
End: 2023-04-21
Payer: COMMERCIAL

## 2023-04-21 PROCEDURE — 77386 PR IMRT TREATMENT DELIVERY, COMPLEX: CPT | Performed by: SURGERY

## 2023-04-21 PROCEDURE — 77014 PR CT GUIDE FOR PLACEMENT RADIATION THERAPY FIELDS: CPT | Performed by: SURGERY

## 2023-04-23 ENCOUNTER — HEALTH MAINTENANCE LETTER (OUTPATIENT)
Age: 53
End: 2023-04-23

## 2023-04-24 ENCOUNTER — APPOINTMENT (OUTPATIENT)
Dept: RADIATION ONCOLOGY | Facility: CLINIC | Age: 53
End: 2023-04-24
Payer: COMMERCIAL

## 2023-04-24 PROCEDURE — 77014 PR CT GUIDE FOR PLACEMENT RADIATION THERAPY FIELDS: CPT | Performed by: RADIOLOGY

## 2023-04-24 PROCEDURE — 77386 PR IMRT TREATMENT DELIVERY, COMPLEX: CPT | Performed by: RADIOLOGY

## 2023-04-25 ENCOUNTER — APPOINTMENT (OUTPATIENT)
Dept: RADIATION ONCOLOGY | Facility: CLINIC | Age: 53
End: 2023-04-25
Payer: COMMERCIAL

## 2023-04-25 PROCEDURE — 77386 PR IMRT TREATMENT DELIVERY, COMPLEX: CPT | Performed by: SURGERY

## 2023-04-25 PROCEDURE — 77014 PR CT GUIDE FOR PLACEMENT RADIATION THERAPY FIELDS: CPT | Performed by: SURGERY

## 2023-04-26 ENCOUNTER — OFFICE VISIT (OUTPATIENT)
Dept: RADIATION ONCOLOGY | Facility: CLINIC | Age: 53
End: 2023-04-26
Payer: COMMERCIAL

## 2023-04-26 ENCOUNTER — APPOINTMENT (OUTPATIENT)
Dept: RADIATION ONCOLOGY | Facility: CLINIC | Age: 53
End: 2023-04-26
Payer: COMMERCIAL

## 2023-04-26 VITALS
SYSTOLIC BLOOD PRESSURE: 127 MMHG | BODY MASS INDEX: 28.28 KG/M2 | OXYGEN SATURATION: 96 % | WEIGHT: 186 LBS | DIASTOLIC BLOOD PRESSURE: 84 MMHG | HEART RATE: 62 BPM | RESPIRATION RATE: 18 BRPM | TEMPERATURE: 98.1 F

## 2023-04-26 DIAGNOSIS — C30.0 PRIMARY ADENOCARCINOMA OF NASAL CAVITY (H): Primary | ICD-10-CM

## 2023-04-26 PROCEDURE — 77014 PR CT GUIDE FOR PLACEMENT RADIATION THERAPY FIELDS: CPT | Performed by: SURGERY

## 2023-04-26 PROCEDURE — 77386 PR IMRT TREATMENT DELIVERY, COMPLEX: CPT | Performed by: SURGERY

## 2023-04-26 PROCEDURE — 99207 PR DROP WITH A PROCEDURE: CPT | Performed by: SURGERY

## 2023-04-26 PROCEDURE — 77427 RADIATION TX MANAGEMENT X5: CPT | Performed by: SURGERY

## 2023-04-26 PROCEDURE — 77336 RADIATION PHYSICS CONSULT: CPT | Performed by: SURGERY

## 2023-04-26 ASSESSMENT — PAIN SCALES - GENERAL: PAINLEVEL: MILD PAIN (2)

## 2023-04-26 NOTE — PROGRESS NOTES
[facility] PHYSICIANS  SPECIALIZING IN BREAKTHROUGHS  Radiation Oncology    On Treatment Visit Note      Rosalind Murphy      Date: 2023   MRN: [id number]   : 1970  Diagnosis: left sinonasal adenocarcinoma        ID: Ms. 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 Recommendation is for 60Gy/30fx without chemotherapy. We did discussed the role of elective david irradiation, particularly given location of tumor and stage.    Reason for Visit:  On Radiation Treatment Visit       Treatment Summary to Date  Treatment Site: sinonasal Current Dose: 5000/6000 cGy Fractions: 25/30      Chemotherapy  Chemo concurrent with radx?: No    Subjective:   Mild HAs, stable. On Dex 2mg BID. Still working. Altered taste and lack of smell, lack of appetite, but weight is stable from last week.  Complains of epiphora.     Pain Control: Gabapentin 600, 600, 600 mg  Fluid Intake: Adequate   Nutrition: PO  Rinses: 15x  Skin care: Aquaphor  Chemotherapy:    Nursing ROS:   Nutrition Alteration  Diet Type: Patient's Preference  Skin  Skin Reaction: 2 - Moderate to brisk erythema, patchy moist desquamation, mostly confined to skin folds and creases, moderate edema (no desquamation)  Skin Intervention: patient reports applying Aquaphor three times daily  Skin Note: reviewed sun protection  CNS Alteration  CNS note: patient reports intermittent frontal headaches, currently taking Decadron 2 mg po BID  ENT and Mouth Exam  ENT/Mouth Note: patient reports rinsing with baking soda and salt rinse 10-15 times per day, reports dry mouth and thick saliva, using cool mist humidifier at night, reports using dry mouth lozenges as needed,  reports taste changes and change in texture of foods, patient reports intermittent loss of smell, patient reports increasing watering eyes     Gastrointestinal  Nausea: 0 - None  GI Note: patient reports nausea has resolved with starting Decadron 2 mg po BID     Psychosocial  Mood - Anxiety: 0 - Normal  Mood - Depression: 0 - Normal  Psychosocial Note: fatigue at baseline, intermittent fatigue this past week  Pain Assessment  0-10 Pain Scale: 2  Pain Descriptors: Headache  Pain Treatment: Decadron 2 mg po BID      Objective:   /84 (BP Location: Left arm, Patient Position: Chair, Cuff Size: Adult Regular)   Pulse 62   Temp 98.1  F (36.7  C) (Oral)   Resp 18   Wt 84.4 kg (186 lb)   SpO2 96%   BMI 28.28 kg/m    Gen: Appears well, in no acute distress  HN: moderate erythema, no skin breakdown, no mucositis   CV/Resp: rrr, breathing comfortably on room air  Neuro: CN 2-12 grossly intact, UE/LE full strength     Labs:  CBC RESULTS: Recent Labs   Lab Test 02/03/23  0738   WBC 10.2   RBC 3.99   HGB 12.2   HCT 37.3   MCV 94   MCH 30.6   MCHC 32.7   RDW 11.9        ELECTROLYTES:  Recent Labs   Lab Test 02/05/23  0834 02/04/23  0608 02/03/23  0738   NA  --   --  140   POTASSIUM  --   --  4.1   CHLORIDE  --   --  104   NERY  --   --  8.6   CO2  --   --  26   BUN  --   --  8.7   CR  --   --  0.74   GLC 89   < > 118*    < > = values in this interval not displayed.       Assessment:    Tolerating radiation therapy well.  All questions and concerns addressed.      Plan:   1. Continue current therapy.    2. Continue gabapentin, rinses, nutrition, hydration, skin care      Mosaiq chart and setup information reviewed  Ports checked and MVCT/IGRT images checked    Medication Review  Med list reviewed with patient?: Yes  Med list printed and given: Offered and declined  Med Note: patient taking Gabapentin po 600 mg at breakfast, 600 mg at lunch and 600 mg at dinner    Educational Topic Discussed  Education  Instructions: radiation therapy side effects: fatigue, skin changes and skin cares, dry mouth and thick saliva, mouth and throat sores, taste/smell changes, pain/difficulty with swallowing    Jules Pereira MD  Radiation Oncology   [facility]: [phone]

## 2023-04-26 NOTE — LETTER
2023         RE: Rosalind Murphy  [address] [facility] [address]        Dear Colleague,    Thank you for referring your patient, Rosalind Murphy, to the [facility] RADIATION ONCOLOGY [place]. Please see a copy of my visit note below.    [facility] PHYSICIANS  SPECIALIZING IN BREAKTHROUGHS  Radiation Oncology    On Treatment Visit Note      Rosalind Murphy      Date: 2023   MRN: [id number]   : 1970  Diagnosis: left sinonasal adenocarcinoma        ID: Ms. 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 Recommendation is for 60Gy/30fx without chemotherapy. We did discussed the role of elective david irradiation, particularly given location of tumor and stage.    Reason for Visit:  On Radiation Treatment Visit       Treatment Summary to Date  Treatment Site: sinonasal Current Dose: 5000/6000 cGy Fractions: 25/30      Chemotherapy  Chemo concurrent with radx?: No    Subjective:   Mild HAs, stable. On Dex 2mg BID. Still working. Altered taste and lack of smell, lack of appetite, but weight is stable from last week.  Complains of epiphora.     Pain Control: Gabapentin 600, 600, 600 mg  Fluid Intake: Adequate   Nutrition: PO  Rinses: 15x  Skin care: Aquaphor  Chemotherapy:    Nursing ROS:   Nutrition Alteration  Diet Type: Patient's Preference  Skin  Skin Reaction: 2 - Moderate to brisk erythema, patchy moist desquamation, mostly confined to skin folds and creases, moderate edema (no desquamation)  Skin Intervention: patient reports applying Aquaphor three times daily  Skin Note: reviewed sun protection  CNS Alteration  CNS note: patient reports intermittent frontal headaches, currently  taking Decadron 2 mg po BID  ENT and Mouth Exam  ENT/Mouth Note: patient reports rinsing with baking soda and salt rinse 10-15 times per day, reports dry mouth and thick saliva, using cool mist humidifier at night, reports using dry mouth lozenges as needed, reports taste changes and change in texture of foods, patient reports intermittent loss of smell, patient reports increasing watering eyes     Gastrointestinal  Nausea: 0 - None  GI Note: patient reports nausea has resolved with starting Decadron 2 mg po BID     Psychosocial  Mood - Anxiety: 0 - Normal  Mood - Depression: 0 - Normal  Psychosocial Note: fatigue at baseline, intermittent fatigue this past week  Pain Assessment  0-10 Pain Scale: 2  Pain Descriptors: Headache  Pain Treatment: Decadron 2 mg po BID      Objective:   /84 (BP Location: Left arm, Patient Position: Chair, Cuff Size: Adult Regular)   Pulse 62   Temp 98.1  F (36.7  C) (Oral)   Resp 18   Wt 84.4 kg (186 lb)   SpO2 96%   BMI 28.28 kg/m    Gen: Appears well, in no acute distress  HN: moderate erythema, no skin breakdown, no mucositis   CV/Resp: rrr, breathing comfortably on room air  Neuro: CN 2-12 grossly intact, UE/LE full strength     Labs:  CBC RESULTS: Recent Labs   Lab Test 02/03/23  0738   WBC 10.2   RBC 3.99   HGB 12.2   HCT 37.3   MCV 94   MCH 30.6   MCHC 32.7   RDW 11.9        ELECTROLYTES:  Recent Labs   Lab Test 02/05/23  0834 02/04/23  0608 02/03/23  0738   NA  --   --  140   POTASSIUM  --   --  4.1   CHLORIDE  --   --  104   NERY  --   --  8.6   CO2  --   --  26   BUN  --   --  8.7   CR  --   --  0.74   GLC 89   < > 118*    < > = values in this interval not displayed.       Assessment:    Tolerating radiation therapy well.  All questions and concerns addressed.      Plan:   1. Continue current therapy.    2. Continue gabapentin, rinses, nutrition, hydration, skin care      Change[employer] chart and setup information reviewed  Ports checked and MVCT/IGRT images  checked    Medication Review  Med list reviewed with patient?: Yes  Med list printed and given: Offered and declined  Med Note: patient taking Gabapentin po 600 mg at breakfast, 600 mg at lunch and 600 mg at dinner    Educational Topic Discussed  Education Instructions: radiation therapy side effects: fatigue, skin changes and skin cares, dry mouth and thick saliva, mouth and throat sores, taste/smell changes, pain/difficulty with swallowing    Jules Pereira MD  Radiation Oncology   [facility]: [phone]          Again, thank you for allowing me to participate in the care of your patient.        Sincerely,        Jules Pereira MD

## 2023-04-26 NOTE — PATIENT INSTRUCTIONS
Please contact [facility] RN with questions or concerns following today's appointment: [phone].       Please feel free to leave a detailed message if your call is not answered.    If your call is not received before 3:00 PM, it may not be returned until the following business day.    If you are receiving radiation treatment and need assistance after 3:00 PM or on the weekends, please call [phone] and ask to speak to the radiation oncologist on-call.    Thank you!    Darline BRANCH

## 2023-04-27 ENCOUNTER — APPOINTMENT (OUTPATIENT)
Dept: RADIATION ONCOLOGY | Facility: CLINIC | Age: 53
End: 2023-04-27
Payer: COMMERCIAL

## 2023-04-27 PROCEDURE — 77386 PR IMRT TREATMENT DELIVERY, COMPLEX: CPT | Performed by: SURGERY

## 2023-04-27 PROCEDURE — 77014 PR CT GUIDE FOR PLACEMENT RADIATION THERAPY FIELDS: CPT | Performed by: SURGERY

## 2023-04-28 ENCOUNTER — APPOINTMENT (OUTPATIENT)
Dept: RADIATION ONCOLOGY | Facility: CLINIC | Age: 53
End: 2023-04-28
Payer: COMMERCIAL

## 2023-04-28 PROCEDURE — 77014 PR CT GUIDE FOR PLACEMENT RADIATION THERAPY FIELDS: CPT | Performed by: SURGERY

## 2023-04-28 PROCEDURE — 77386 PR IMRT TREATMENT DELIVERY, COMPLEX: CPT | Performed by: SURGERY

## 2023-05-01 ENCOUNTER — APPOINTMENT (OUTPATIENT)
Dept: RADIATION ONCOLOGY | Facility: CLINIC | Age: 53
End: 2023-05-01
Payer: COMMERCIAL

## 2023-05-01 PROCEDURE — 77014 PR CT GUIDE FOR PLACEMENT RADIATION THERAPY FIELDS: CPT | Performed by: RADIOLOGY

## 2023-05-01 PROCEDURE — 77386 PR IMRT TREATMENT DELIVERY, COMPLEX: CPT | Performed by: RADIOLOGY

## 2023-05-02 ENCOUNTER — APPOINTMENT (OUTPATIENT)
Dept: RADIATION ONCOLOGY | Facility: CLINIC | Age: 53
End: 2023-05-02
Payer: COMMERCIAL

## 2023-05-02 PROCEDURE — 77014 PR CT GUIDE FOR PLACEMENT RADIATION THERAPY FIELDS: CPT | Performed by: SURGERY

## 2023-05-02 PROCEDURE — 77386 PR IMRT TREATMENT DELIVERY, COMPLEX: CPT | Performed by: SURGERY

## 2023-05-03 ENCOUNTER — DOCUMENTATION ONLY (OUTPATIENT)
Dept: RADIATION ONCOLOGY | Facility: CLINIC | Age: 53
End: 2023-05-03

## 2023-05-03 ENCOUNTER — APPOINTMENT (OUTPATIENT)
Dept: RADIATION ONCOLOGY | Facility: CLINIC | Age: 53
End: 2023-05-03
Payer: COMMERCIAL

## 2023-05-03 ENCOUNTER — OFFICE VISIT (OUTPATIENT)
Dept: RADIATION ONCOLOGY | Facility: CLINIC | Age: 53
End: 2023-05-03
Payer: COMMERCIAL

## 2023-05-03 VITALS
TEMPERATURE: 97.8 F | OXYGEN SATURATION: 97 % | DIASTOLIC BLOOD PRESSURE: 71 MMHG | SYSTOLIC BLOOD PRESSURE: 113 MMHG | BODY MASS INDEX: 28.13 KG/M2 | HEART RATE: 66 BPM | RESPIRATION RATE: 18 BRPM | WEIGHT: 185 LBS

## 2023-05-03 DIAGNOSIS — C30.0 PRIMARY ADENOCARCINOMA OF NASAL CAVITY (H): Primary | ICD-10-CM

## 2023-05-03 PROCEDURE — 77336 RADIATION PHYSICS CONSULT: CPT | Performed by: SURGERY

## 2023-05-03 PROCEDURE — 99207 PR NO CHARGE LOS: CPT | Performed by: SURGERY

## 2023-05-03 PROCEDURE — 77386 PR IMRT TREATMENT DELIVERY, COMPLEX: CPT | Performed by: SURGERY

## 2023-05-03 PROCEDURE — 77427 RADIATION TX MANAGEMENT X5: CPT | Performed by: SURGERY

## 2023-05-03 PROCEDURE — 99207 PR DROP WITH A PROCEDURE: CPT | Performed by: SURGERY

## 2023-05-03 PROCEDURE — 77014 PR CT GUIDE FOR PLACEMENT RADIATION THERAPY FIELDS: CPT | Performed by: SURGERY

## 2023-05-03 ASSESSMENT — PAIN SCALES - GENERAL: PAINLEVEL: NO PAIN (0)

## 2023-05-03 NOTE — PROGRESS NOTES
[facility] PHYSICIANS  SPECIALIZING IN BREAKTHROUGHS  Radiation Oncology    On Treatment Visit Note      Rosalind Murphy      Date: May 3, 2023   MRN: [id number]   : 1970  Diagnosis: left sinonasal adenocarcinoma        ID: Ms. 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 Recommendation is for 60Gy/30fx without chemotherapy. We did discussed the role of elective david irradiation, particularly given location of tumor and stage.    Reason for Visit:  On Radiation Treatment Visit       Treatment Summary to Date  Treatment Site: sinonasal Current Dose: 6000/6000 cGy Fractions: 30/30      Chemotherapy  Chemo concurrent with radx?: No    Subjective:   Mild HAs, stable. On Dex 2mg BID. Still working. Altered taste and lack of smell, lack of appetite, but weight is stable from last week.  Complains of epiphora. Still complains of global hair thinning.     Pain Control: Gabapentin 600, 600, 600 mg  Fluid Intake: Adequate   Nutrition: PO  Rinses: 15x+  Skin care: Aquaphor  Chemotherapy:    Nursing ROS:   Nutrition Alteration  Diet Type: Patient's Preference  Skin  Skin Reaction: 2 - Moderate to brisk erythema, patchy moist desquamation, mostly confined to skin folds and creases, moderate edema (no desquamation)  Skin Intervention: patient reports applying Aquaphor three times daily  Skin Note: reviewed sun protection  CNS Alteration  CNS note: patient reports intermittent frontal headaches, currently taking Decadron 2 mg po BID - taper written in patient instructions  ENT and Mouth Exam  ENT/Mouth Note: patient reports rinsing with baking soda and salt rinse 10-15 times per day, reports dry mouth and thick saliva,  using cool mist humidifier at night, reports using dry mouth lozenges as needed, reports taste changes and change in texture of foods, patient reports intermittent loss of smell, patient reports increasing watering eyes     Gastrointestinal  Nausea: 0 - None  GI Note: patient reports nausea has resolved with starting Decadron 2 mg po BID     Psychosocial  Mood - Anxiety: 0 - Normal  Mood - Depression: 0 - Normal  Psychosocial Note: fatigue at baseline, intermittent fatigue this past week  Pain Assessment  0-10 Pain Scale: 0      Objective:   /71 (BP Location: Left arm, Patient Position: Chair, Cuff Size: Adult Regular)   Pulse 66   Temp 97.8  F (36.6  C) (Oral)   Resp 18   Wt 83.9 kg (185 lb)   SpO2 97%   BMI 28.13 kg/m    Gen: Appears well, in no acute distress  HN: moderate b/l facial erythema, no skin breakdown, no mucositis of oral cavity  CV/Resp: rrr, breathing comfortably on room air  Neuro: CN 2-12 grossly intact, UE/LE full strength     Labs:  CBC RESULTS: Recent Labs   Lab Test 02/03/23  0738   WBC 10.2   RBC 3.99   HGB 12.2   HCT 37.3   MCV 94   MCH 30.6   MCHC 32.7   RDW 11.9        ELECTROLYTES:  Recent Labs   Lab Test 02/05/23  0834 02/04/23  0608 02/03/23  0738   NA  --   --  140   POTASSIUM  --   --  4.1   CHLORIDE  --   --  104   NERY  --   --  8.6   CO2  --   --  26   BUN  --   --  8.7   CR  --   --  0.74   GLC 89   < > 118*    < > = values in this interval not displayed.       Assessment:    Tolerating radiation therapy well.  All questions and concerns addressed.      Plan:     1. Completes RT today, follow up 1 month    2. Dr. Vyas follow up scheduled 5/26/23    3. Steroid taper, gabapentin taper discussed      Mosaiq chart and setup information reviewed   Ports checked and MVCT/IGRT images checked    Medication Review  Med list reviewed with patient?: Yes  Med list printed and given: Offered and declined  Med Note: patient taking Gabapentin po 600 mg at breakfast, 600 mg at  lunch and 600 mg at dinner    Educational Topic Discussed  Additional Instructions: return visit with Dr. Pereira in one month - scheduling will contact patient, follow-up with Dr. Vyas scheduled 5/26/2023, Gabapentin taper instructions provided to patient, Decadron taper instructions provided in patient instructions  Education Instructions: radiation therapy side effects: fatigue, skin changes and skin cares, dry mouth and thick saliva, mouth and throat sores, taste/smell changes, pain/difficulty with swallowing    Jules Pereira MD  Radiation Oncology   [facility]: [phone]

## 2023-05-03 NOTE — PATIENT INSTRUCTIONS
**DECADRON TAPER**     - Continue with Decadron 2 mg by mouth two times daily for two weeks through Tuesday, May 16, 2023  - On Wednesday, May 17th, take Decadron 2 mg by mouth one time per day for one week  - On Wednesday, May 24th, take Decadron 2 mg by mouth every other day for one week  - Take final dose of Decadron on Tuesday, May 30, 2023      **GABAPENTIN TAPER**    - Continue taking Gabapentin 600 mg by mouth three times per day through Tuesday, May 16, 2023  - Starting Wednesday, May 17th - start taper that was provided in printed form      Please contact [facility] RN with questions or concerns following today's appointment: [phone].       Please feel free to leave a detailed message if your call is not answered.    If your call is not received before 3:00 PM, it may not be returned until the following business day.    If you are receiving radiation treatment and need assistance after 3:00 PM or on the weekends, please call [phone] and ask to speak to the radiation oncologist on-call.    Thank you!    Darline BRANCH

## 2023-05-03 NOTE — LETTER
5/3/2023         RE: Rosalind Murphy  [address] [facility] [address]        Dear Colleague,    Thank you for referring your patient, Rosalind Murphy, to the [facility] RADIATION [facility]COL[facility] [place]. Please see a copy of my visit note below.    [facility] PHYSICIANS  SPECIALIZING IN BREAKTHROUGHS  Radiation Oncology    On Treatment Visit Note      Rosalind Murphy      Date: May 3, 2023   MRN: [id number]   : 1970  Diagnosis: left sinonasal adenocarcinoma        ID: Ms. 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 Recommendation is for 60Gy/30fx without chemotherapy. We did discussed the role of elective david irradiation, particularly given location of tumor and stage.    Reason for Visit:  On Radiation Treatment Visit       Treatment Summary to Date  Treatment Site: sinonasal Current Dose: 6000/6000 cGy Fractions: 30/30      Chemotherapy  Chemo concurrent with radx?: No    Subjective:   Mild HAs, stable. On Dex 2mg BID. Still working. Altered taste and lack of smell, lack of appetite, but weight is stable from last week.  Complains of epiphora. Still complains of global hair thinning.     Pain Control: Gabapentin 600, 600, 600 mg  Fluid Intake: Adequate   Nutrition: PO  Rinses: 15x+  Skin care: Aquaphor  Chemotherapy:    Nursing ROS:   Nutrition Alteration  Diet Type: Patient's Preference  Skin  Skin Reaction: 2 - Moderate to brisk erythema, patchy moist desquamation, mostly confined to skin folds and creases, moderate edema (no desquamation)  Skin Intervention: patient reports applying Aquaphor three times daily  Skin Note: reviewed sun protection  CNS Alteration  CNS note: patient reports  intermittent frontal headaches, currently taking Decadron 2 mg po BID - taper written in patient instructions  ENT and Mouth Exam  ENT/Mouth Note: patient reports rinsing with baking soda and salt rinse 10-15 times per day, reports dry mouth and thick saliva, using cool mist humidifier at night, reports using dry mouth lozenges as needed, reports taste changes and change in texture of foods, patient reports intermittent loss of smell, patient reports increasing watering eyes     Gastrointestinal  Nausea: 0 - None  GI Note: patient reports nausea has resolved with starting Decadron 2 mg po BID     Psychosocial  Mood - Anxiety: 0 - Normal  Mood - Depression: 0 - Normal  Psychosocial Note: fatigue at baseline, intermittent fatigue this past week  Pain Assessment  0-10 Pain Scale: 0      Objective:   /71 (BP Location: Left arm, Patient Position: Chair, Cuff Size: Adult Regular)   Pulse 66   Temp 97.8  F (36.6  C) (Oral)   Resp 18   Wt 83.9 kg (185 lb)   SpO2 97%   BMI 28.13 kg/m    Gen: Appears well, in no acute distress  HN: moderate b/l facial erythema, no skin breakdown, no mucositis of oral cavity  CV/Resp: rrr, breathing comfortably on room air  Neuro: CN 2-12 grossly intact, UE/LE full strength     Labs:  CBC RESULTS: Recent Labs   Lab Test 02/03/23  0738   WBC 10.2   RBC 3.99   HGB 12.2   HCT 37.3   MCV 94   MCH 30.6   MCHC 32.7   RDW 11.9        ELECTROLYTES:  Recent Labs   Lab Test 02/05/23  0834 02/04/23  0608 02/03/23  0738   NA  --   --  140   POTASSIUM  --   --  4.1   CHLORIDE  --   --  104   NERY  --   --  8.6   CO2  --   --  26   BUN  --   --  8.7   CR  --   --  0.74   GLC 89   < > 118*    < > = values in this interval not displayed.       Assessment:    Tolerating radiation therapy well.  All questions and concerns addressed.      Plan:     1. Completes RT today, follow up 1 month    2. Dr. Vyas follow up scheduled 5/26/23    3. Steroid taper, gabapentin taper discussed      Abbie  chart and setup information reviewed   Ports checked and MVCT/IGRT images checked    Medication Review  Med list reviewed with patient?: Yes  Med list printed and given: Offered and declined  Med Note: patient taking Gabapentin po 600 mg at breakfast, 600 mg at lunch and 600 mg at dinner    Educational Topic Discussed  Additional Instructions: return visit with Dr. Pereira in one month - scheduling will contact patient, follow-up with Dr. Vyas scheduled 5/26/2023, Gabapentin taper instructions provided to patient, Decadron taper instructions provided in patient instructions  Education Instructions: radiation therapy side effects: fatigue, skin changes and skin cares, dry mouth and thick saliva, mouth and throat sores, taste/smell changes, pain/difficulty with swallowing    Jules Pereira MD  Radiation Oncology   [facility]  Clinic: [phone]          Again, thank you for allowing me to participate in the care of your patient.        Sincerely,        Jules Pereira MD

## 2023-05-26 ENCOUNTER — OFFICE VISIT (OUTPATIENT)
Dept: OTOLARYNGOLOGY | Facility: CLINIC | Age: 53
End: 2023-05-26
Payer: COMMERCIAL

## 2023-05-26 VITALS
WEIGHT: 183 LBS | HEIGHT: 68 IN | DIASTOLIC BLOOD PRESSURE: 73 MMHG | BODY MASS INDEX: 27.74 KG/M2 | HEART RATE: 67 BPM | SYSTOLIC BLOOD PRESSURE: 111 MMHG

## 2023-05-26 DIAGNOSIS — J34.89 NASAL OBSTRUCTION: Primary | ICD-10-CM

## 2023-05-26 DIAGNOSIS — J32.2 CHRONIC ETHMOIDAL SINUSITIS: ICD-10-CM

## 2023-05-26 DIAGNOSIS — J01.01 ACUTE RECURRENT MAXILLARY SINUSITIS: ICD-10-CM

## 2023-05-26 DIAGNOSIS — C80.1 ADENOCARCINOMA (H): ICD-10-CM

## 2023-05-26 PROCEDURE — 31237 NSL/SINS NDSC SURG BX POLYPC: CPT | Mod: LT | Performed by: OTOLARYNGOLOGY

## 2023-05-26 PROCEDURE — 87075 CULTR BACTERIA EXCEPT BLOOD: CPT | Performed by: PATHOLOGY

## 2023-05-26 PROCEDURE — 87070 CULTURE OTHR SPECIMN AEROBIC: CPT | Performed by: PATHOLOGY

## 2023-05-26 PROCEDURE — 87077 CULTURE AEROBIC IDENTIFY: CPT | Performed by: PATHOLOGY

## 2023-05-26 PROCEDURE — 87186 SC STD MICRODIL/AGAR DIL: CPT | Performed by: PATHOLOGY

## 2023-05-26 PROCEDURE — 99213 OFFICE O/P EST LOW 20 MIN: CPT | Mod: 25 | Performed by: OTOLARYNGOLOGY

## 2023-05-26 PROCEDURE — 99000 SPECIMEN HANDLING OFFICE-LAB: CPT | Performed by: PATHOLOGY

## 2023-05-26 ASSESSMENT — PAIN SCALES - GENERAL: PAINLEVEL: NO PAIN (0)

## 2023-05-26 NOTE — LETTER
5/26/2023       RE: Rosalind Murphy  [address] [facility] [address]     Dear Colleague,    Thank you for referring your patient, Rosalind Murphy, to the [facility] EAR NOSE AND [facility]AT [facility]INIC [place] at [facility]. Please see a copy of my visit note below.      [facility]  Return Visit  Encounter date:   May 26, 2023    Chief Complaint:   Follow-up     ID: s/p second stage surgery as below, left sinonasal adenocarcinoma    Interval History:   Rosalind Murphy is a 53 year old female with history of pT3cN0 left sinonasal adenocarcinoma who presents for follow up. She started radiation with Dr. Pereira and completed 30/30 fractrions on 5/3/23.     She continues to have impacted taste and smell. Some liquids and foods have sour tastes. She will have fleeting ability to detect smells.She has lost patches of hair in a band like shape from the ears to the occiput that started around the time of radiation and stopped two weeks after. She does have dry eyes. Actively, she feels increased sinusitis symptoms and has been rinsing regularly.     She has been eating ice cream when unable to eat throughout the day otherwise. Minimal flavor detection.     Sino-Nasal Outcome Test (SNOT - 22)  DNT    [place] [facility]  Procedure Date: 02/02/2023     PREOPERATIVE DIAGNOSES:    1.  Intestinal type sinonasal adenocarcinoma.  2.  Nasal obstruction.     PREOPERATIVE DIAGNOSES:    1.  Intestinal type sinonasal adenocarcinoma.  2.  Nasal obstruction.     PROCEDURE PERFORMED:     1.  Endoscopic endonasal transcribriform resection of anterior cranial fossa intradural tumor, left sinonasal adenocarcinoma.  2.  [place] of right-sided pedicled nasoseptal flap pedicled off the posterior septal branch of sphenopalatine artery.     SURGEON:  Germain Vyas MD     CO-SURGEON:  Abhi Tidwell MD    Review of systems: A 14-point review of systems has been conducted  "and is negative for any notable symptoms, except as dictated in the history of present illness.     Physical Exam:  Vital signs: /73 (BP Location: Left arm, Patient Position: Sitting, Cuff Size: Adult Regular)   Pulse 67   Ht 1.727 m (5' 8\")   Wt 83 kg (183 lb)   BMI 27.83 kg/m     General Appearance: No acute distress, appropriate demeanor, conversant  Eyes: moist conjunctivae; EOMI; pupils symmetric; visual acuity grossly intact; no proptosis  Head: normocephalic; overall symmetric appearance without deformity  Face: overall symmetric without deformity; HB I/VI  Nose: No external deformity; see endoscopy  Lungs: symmetric chest rise; no wheezing  CV: Good distal perfusion; normal hear rate  Extremities: No deformity  Neurologic Exam: Cranial nerves II-XII are grossly intact; no focal deficit     Procedure Note  Procedure performed: Rigid nasal endoscopy with left-sided debridement  Indication: To evaluate for sinonasal pathology not visualized on routine anterior rhinoscopy  Anesthesia: 4% topical lidocaine with 0.05 % oxymetazoline  Description of procedure: A 30 degree, 3 mm rigid endoscope was inserted into bilateral nasal cavities and the nasal valves, nasal cavity, middle meatus, sphenoethmoid recess, nasopharynx were evaluated for evidence of obstruction, edema, purulence, polyps and/or mass/lesion.     I then used a combination of non-cutting forceps, straight and curved suction to clear bilateral surgical cavities of packing, clot, crust, and fibrinopurulent debris.       [place] Endoscopic Scoring System  Endoscopic observation Right Left   Polyps in middle meatus (0 = absent, 1 = restricted to middle meatus, 2 = Beyond middle meatus) 0 0   Discharge (0 = absent, 1 = thin and clear, 2 = thick, purulent) 1 1   Edema (0 = absent, 1 = mild-moderate, 2 = moderate-severe) 0 0   Crusting (0 = absent, 1 = mild-moderate, 2 = moderate-severe) 1 1   Scarring (0= absent, 1 = mild-moderate, 2 = " moderate-severe) 0 0   Total 2 2     Findings  RT: biofilm and crusting of the nasal cavity; no evidence of residual or recurrent tumor  LT: biofilm and crusting of the nasal cavity; no evidence of residual or recurrent tumor    Crusting and biofilm cleared with suction.     The patient tolerated the procedure well without complication.     Laboratory Review:  n/a     Imaging Review:  CT sinus 12/15/2022:  1.  The left nasal cavity is opacified along its mid to posterior   aspect with extension of soft tissue into the left posterior   nasopharynx. There is questionable remodeling of the posterolateral   wall of the left nasal cavity. Is an underlying lesion at this   location is possible and correlation with direct visualization is   advised.        MRI skull base w and w/o contrast 1/2/2023  IMPRESSION: 3.2 x 1.4 x 3.1 cm left posterior nasal cavity mass  compatible with pathology proven adenocarcinoma..     PET/CT 1/3/2023:  IMPRESSION: In this patient with biopsy-proven left nasal cavity  adenocarcinoma:  1. No evidence of metastasis in the chest, abdomen or pelvis.  2. Please refer to dedicated report for findings in the head and neck  region.     Impression:   1. 2.8 x 2.7 x 1.4 cm hypermetabolic mass in the left posterior nasal  cavity compatible with pathology proven adenocarcinoma.  2. No cervical lymphadenopathy.   3. Please refer to the whole body PET CT performed as a separate  report, for the findings of the remainder of the body.        *I have personally reviewed these images and agree with the radiologist's interpretation*        Pathology Review:  Final Diagnosis 2/2/23  A. LEFT POSTERIOR ETHMOID, EXCISIONAL BIOPSY:  -Benign sinonasal mucosa with chronic inflammation and focal calcifications.  B. LEFT ANTERIOR ETHMOID, EXCISIONAL BIOPSY:  -Benign bone and sinonasal mucosa with chronic inflammation.  C. LEFT LATERAL NASAL WALL, BIOPSY:  -Benign sinonasal mucosa with chronic inflammation  D. RIGHT  OLFACTORY CLEFT, BIOPSY:  -Benign sinonasal mucosa with chronic inflammation.  E. LEFT ANTERIOR SEPTAL MARGIN, EXCISION:  -Benign sinonasal mucosa with chronic inflammation.  F. LEFT INFERIOR SEPTAL MARGIN, EXCISION:  -Benign sinonasal mucosa with chronic inflammation  G. LEFT SPHENOID ROSTRUM, EXCISION:  -Benign sinonasal mucosa with chronic inflammation  H. LEFT OLFACTORY CLEFT, BIOPSY:  -Benign fibroconnective tissue and bone.  I. LEFT OLFACTORY CLEFT #2, BIOPSY:  -FOCI OF RESIDUAL INTESTINAL-TYPE ADENOCARCINOMA in fragments of fibroconnective and mucosal tissue with chronic  inflammation.  J. LEFT OLFACTORY #3, BIOPSY:  -Negative for malignancy.  K. POSTERIOR DURAL MARGIN, BIOPSY:  -Scant fragments of fibroconnective tissue, negative for malignancy.  L. ANTERIOR DURAL MARGIN, BIOPSY:  -Small fragment of fibroconnective and nerve tissue, negative for malignancy.  M. MEDIAL DURAL MARGIN, BIOPSY:  -Small fragment of dense fibrous tissue, negative for malignancy.  N. LATERAL DURAL MARGIN, BIOPSY:  -Small fragment of dense fibrous tissue, negative for malignancy.  O. POSTERIOR NERVE OLFACTORY MARGIN, EXCISION:  -Small fragment of neural type tissue, negative for malignancy.        Final Diagnosis 1/17/23  A. NOSE, LEFT SINONASAL TUMOR, EXCISION:  - SINONASAL ADENOCARCINOMA, INTESTINAL TYPE  - See comment  B. NOSE, LEFT MIDDLE TURBINATE, EXCISION:  - Fragments of benign respiratory mucosa and lamellar bone  - No evidence of malignancy  C. NOSE, LEFT OLFACTORY CLEFT, EXCISION:  - SINONASAL ADENOCARCINOMA, INTESTINAL TYPE        Nasal mass biopsy - 12/23/2022  Final Diagnosis        NASAL MASS, BIOPSY:   1. Moderately-differentiated adenocarcinoma, favor     sinonasal adenocarcinoma, non-intestinal type         Assessment/Medical Decision Making:  Left olfactory cleft ITAC  S/p stage 1 surgery  S/p stage 2 surgery 2/2/23  Nasal obstruction secondary to above  Atypical facial pain  Anosmia and  Ageusia      Plan:  Bilateral endoscopy performed today shows heavy burden of biofilm in the left nasal cavity. I will start her on PO antibiotic and gentamicin rinses. I will order her MRI and PET CT in 3 months for cancer surveillance. Follow-up in 6 weeks.     Germain Vyas MD    [facility]  Rhinology, Endoscopic Skull Base Surgery  [facility]  Department of Otolaryngology - Head & Neck Surgery    Scribe Disclosure:  I, Eduin Donatoter, am serving as a scribe to document services personally performed by Germain Vyas MD at this visit, based upon the provider's statements to me. All documentation has been reviewed by the aforementioned provider prior to being entered into the official medical record.     Additional portions of the patient's history have been reviewed below.   ~~~~~~~~~~~~~~~~~~~~~~~~~~~~~~~~~~~~~~~~~~~~~~~~~~~~~~~~~~~~~~~~~~~~~~~~~~~~~~~~~~~~~~~~~~~~~~~~~~~~~~~~~~~~~~~~~~~~~~~~~~~~~~~~~~~~~~~    Past Medical History:   Diagnosis Date    History of hysterectomy 03/06/2015    Hypothyroidism     Motion sickness     PONV (postoperative nausea and vomiting)     Primary adenocarcinoma of nasal cavity (H) 12/23/2022    S/P radiation therapy     6,000 cGy to sinonasal completed on 5/3/2023 [facility]        Past Surgical History:   Procedure Laterality Date    CATARACT IOL, RT/LT Left     as a child    HYSTERECTOMY  2015    LAPAROTOMY EXPLORATORY  2015    OPTICAL TRACKING SYSTEM ENDOSCOPIC EXCISION SINUS TUMOR Left 1/17/2023    Procedure: EXCISION, NEOPLASM, PARANASAL SINUS, ENDOSCOPIC, USING OPTICAL TRACKING SYSTEM;  Surgeon: Germain Vyas MD;  Location: [facility] OR    OPTICAL TRACKING SYSTEM ENDOSCOPIC EXCISION SINUS TUMOR Bilateral 2/2/2023    Procedure: stealth assisted endoscopic endonasal anterior craniofacial resection, nasoseptal flap;  Surgeon: Germain Vyas MD;  Location: [facility] OR    WI BIOPSY NASAL LESION  12/23/2022     PROCURE GRAFT FAT Left 2/2/2023    Procedure: fascia elsy graft, left upper thigh;  Surgeon: Germain Vyas MD;  Location: [facility] OR    RETINAL DETACHMENT SURGERY Left 2019    TONSILLECTOMY      age 6        Family History   Problem Relation Age of Onset    Breast Cancer Mother     Breast Cancer Maternal Aunt     Breast Cancer Maternal Aunt     Lung Cancer Maternal Aunt     Bladder Cancer Maternal Aunt     Cancer Maternal Uncle         Eye        Social History     Socioeconomic History    Marital status:    Tobacco Use    Smoking status: Never    Smokeless tobacco: Never   Substance and Sexual Activity    Alcohol use: Yes     Comment: social use per patient    Drug use: Never           Again, thank you for allowing me to participate in the care of your patient.      Sincerely,    Germain Vyas MD

## 2023-05-26 NOTE — PROGRESS NOTES
"  [facility]  Return Visit  Encounter date:   May 26, 2023    Chief Complaint:   Follow-up     ID: s/p second stage surgery as below, left sinonasal adenocarcinoma    Interval History:   Rosalind Murphy is a 53 year old female with history of pT3cN0 left sinonasal adenocarcinoma who presents for follow up. She started radiation with Dr. Pereira and completed 30/30 fractrions on 5/3/23.     She continues to have impacted taste and smell. Some liquids and foods have sour tastes. She will have fleeting ability to detect smells.She has lost patches of hair in a band like shape from the ears to the occiput that started around the time of radiation and stopped two weeks after. She does have dry eyes. Actively, she feels increased sinusitis symptoms and has been rinsing regularly.     She has been eating ice cream when unable to eat throughout the day otherwise. Minimal flavor detection.     Sino-Nasal Outcome Test (SNOT - 22)  DNT    [facility]  Procedure Date: 02/02/2023     PREOPERATIVE DIAGNOSES:    1.  Intestinal type sinonasal adenocarcinoma.  2.  Nasal obstruction.     PREOPERATIVE DIAGNOSES:    1.  Intestinal type sinonasal adenocarcinoma.  2.  Nasal obstruction.     PROCEDURE PERFORMED:     1.  Endoscopic endonasal transcribriform resection of anterior cranial fossa intradural tumor, left sinonasal adenocarcinoma.  2.  Waldorf of right-sided pedicled nasoseptal flap pedicled off the posterior septal branch of sphenopalatine artery.     SURGEON:  Germain Vyas MD     CO-SURGEON:  Abhi Tidwell MD    Review of systems: A 14-point review of systems has been conducted and is negative for any notable symptoms, except as dictated in the history of present illness.     Physical Exam:  Vital signs: /73 (BP Location: Left arm, Patient Position: Sitting, Cuff Size: Adult Regular)   Pulse 67   Ht 1.727 m (5' 8\")   Wt 83 kg (183 lb)   BMI 27.83 kg/m     General Appearance: No acute " distress, appropriate demeanor, conversant  Eyes: moist conjunctivae; EOMI; pupils symmetric; visual acuity grossly intact; no proptosis  Head: normocephalic; overall symmetric appearance without deformity  Face: overall symmetric without deformity; HB I/VI  Nose: No external deformity; see endoscopy  Lungs: symmetric chest rise; no wheezing  CV: Good distal perfusion; normal hear rate  Extremities: No deformity  Neurologic Exam: Cranial nerves II-XII are grossly intact; no focal deficit     Procedure Note  Procedure performed: Rigid nasal endoscopy with left-sided debridement  Indication: To evaluate for sinonasal pathology not visualized on routine anterior rhinoscopy  Anesthesia: 4% topical lidocaine with 0.05 % oxymetazoline  Description of procedure: A 30 degree, 3 mm rigid endoscope was inserted into bilateral nasal cavities and the nasal valves, nasal cavity, middle meatus, sphenoethmoid recess, nasopharynx were evaluated for evidence of obstruction, edema, purulence, polyps and/or mass/lesion.     I then used a combination of non-cutting forceps, straight and curved suction to clear bilateral surgical cavities of packing, clot, crust, and fibrinopurulent debris.       Baker-Imtiaz Endoscopic Scoring System  Endoscopic observation Right Left   Polyps in middle meatus (0 = absent, 1 = restricted to middle meatus, 2 = Beyond middle meatus) 0 0   Discharge (0 = absent, 1 = thin and clear, 2 = thick, purulent) 1 1   Edema (0 = absent, 1 = mild-moderate, 2 = moderate-severe) 0 0   Crusting (0 = absent, 1 = mild-moderate, 2 = moderate-severe) 1 1   Scarring (0= absent, 1 = mild-moderate, 2 = moderate-severe) 0 0   Total 2 2     Findings  RT: biofilm and crusting of the nasal cavity; no evidence of residual or recurrent tumor  LT: biofilm and crusting of the nasal cavity; no evidence of residual or recurrent tumor    Crusting and biofilm cleared with suction.     The patient tolerated the procedure well without  complication.     Laboratory Review:  n/a     Imaging Review:  CT sinus 12/15/2022:  1.  The left nasal cavity is opacified along its mid to posterior   aspect with extension of soft tissue into the left posterior   nasopharynx. There is questionable remodeling of the posterolateral   wall of the left nasal cavity. Is an underlying lesion at this   location is possible and correlation with direct visualization is   advised.        MRI skull base w and w/o contrast 1/2/2023  IMPRESSION: 3.2 x 1.4 x 3.1 cm left posterior nasal cavity mass  compatible with pathology proven adenocarcinoma..     PET/CT 1/3/2023:  IMPRESSION: In this patient with biopsy-proven left nasal cavity  adenocarcinoma:  1. No evidence of metastasis in the chest, abdomen or pelvis.  2. Please refer to dedicated report for findings in the head and neck  region.     Impression:   1. 2.8 x 2.7 x 1.4 cm hypermetabolic mass in the left posterior nasal  cavity compatible with pathology proven adenocarcinoma.  2. No cervical lymphadenopathy.   3. Please refer to the whole body PET CT performed as a separate  report, for the findings of the remainder of the body.        *I have personally reviewed these images and agree with the radiologist's interpretation*        Pathology Review:  Final Diagnosis 2/2/23  A. LEFT POSTERIOR ETHMOID, EXCISIONAL BIOPSY:  -Benign sinonasal mucosa with chronic inflammation and focal calcifications.  B. LEFT ANTERIOR ETHMOID, EXCISIONAL BIOPSY:  -Benign bone and sinonasal mucosa with chronic inflammation.  C. LEFT LATERAL NASAL WALL, BIOPSY:  -Benign sinonasal mucosa with chronic inflammation  D. RIGHT OLFACTORY CLEFT, BIOPSY:  -Benign sinonasal mucosa with chronic inflammation.  E. LEFT ANTERIOR SEPTAL MARGIN, EXCISION:  -Benign sinonasal mucosa with chronic inflammation.  F. LEFT INFERIOR SEPTAL MARGIN, EXCISION:  -Benign sinonasal mucosa with chronic inflammation  G. LEFT SPHENOID ROSTRUM, EXCISION:  -Benign sinonasal  mucosa with chronic inflammation  H. LEFT OLFACTORY CLEFT, BIOPSY:  -Benign fibroconnective tissue and bone.  I. LEFT OLFACTORY CLEFT #2, BIOPSY:  -FOCI OF RESIDUAL INTESTINAL-TYPE ADENOCARCINOMA in fragments of fibroconnective and mucosal tissue with chronic  inflammation.  J. LEFT OLFACTORY #3, BIOPSY:  -Negative for malignancy.  K. POSTERIOR DURAL MARGIN, BIOPSY:  -Scant fragments of fibroconnective tissue, negative for malignancy.  L. ANTERIOR DURAL MARGIN, BIOPSY:  -Small fragment of fibroconnective and nerve tissue, negative for malignancy.  M. MEDIAL DURAL MARGIN, BIOPSY:  -Small fragment of dense fibrous tissue, negative for malignancy.  N. LATERAL DURAL MARGIN, BIOPSY:  -Small fragment of dense fibrous tissue, negative for malignancy.  O. POSTERIOR NERVE OLFACTORY MARGIN, EXCISION:  -Small fragment of neural type tissue, negative for malignancy.        Final Diagnosis 1/17/23  A. NOSE, LEFT SINONASAL TUMOR, EXCISION:  - SINONASAL ADENOCARCINOMA, INTESTINAL TYPE  - See comment  B. NOSE, LEFT MIDDLE TURBINATE, EXCISION:  - Fragments of benign respiratory mucosa and lamellar bone  - No evidence of malignancy  C. NOSE, LEFT OLFACTORY CLEFT, EXCISION:  - SINONASAL ADENOCARCINOMA, INTESTINAL TYPE        Nasal mass biopsy - 12/23/2022  Final Diagnosis        NASAL MASS, BIOPSY:   1. Moderately-differentiated adenocarcinoma, favor     sinonasal adenocarcinoma, non-intestinal type         Assessment/Medical Decision Making:  Left olfactory cleft ITAC  S/p stage 1 surgery  S/p stage 2 surgery 2/2/23  Nasal obstruction secondary to above  Atypical facial pain  Anosmia and Ageusia      Plan:  Bilateral endoscopy performed today shows heavy burden of biofilm in the left nasal cavity. I will start her on PO antibiotic and gentamicin rinses. I will order her MRI and PET CT in 3 months for cancer surveillance. Follow-up in 6 weeks.     Germain Vyas MD    [facility]  Rhinology, Endoscopic  Skull Base Surgery  [facility]  Department of Otolaryngology - Head & Neck Surgery    Scribe Disclosure:  I, Eduin Donatoter, am serving as a scribe to document services personally performed by Germain Vyas MD at this visit, based upon the provider's statements to me. All documentation has been reviewed by the aforementioned provider prior to being entered into the official medical record.     Additional portions of the patient's history have been reviewed below.   ~~~~~~~~~~~~~~~~~~~~~~~~~~~~~~~~~~~~~~~~~~~~~~~~~~~~~~~~~~~~~~~~~~~~~~~~~~~~~~~~~~~~~~~~~~~~~~~~~~~~~~~~~~~~~~~~~~~~~~~~~~~~~~~~~~~~~~~    Past Medical History:   Diagnosis Date     History of hysterectomy 03/06/2015     Hypothyroidism      Motion sickness      PONV (postoperative nausea and vomiting)      Primary adenocarcinoma of nasal cavity (H) 12/23/2022     S/P radiation therapy     6,000 cGy to sinonasal completed on 5/3/2023 [facility]        Past Surgical History:   Procedure Laterality Date     CATARACT IOL, RT/LT Left     as a child     HYSTERECTOMY  2015     LAPAROTOMY EXPLORATORY  2015     OPTICAL TRACKING SYSTEM ENDOSCOPIC EXCISION SINUS TUMOR Left 1/17/2023    Procedure: EXCISION, NEOPLASM, PARANASAL SINUS, ENDOSCOPIC, USING OPTICAL TRACKING SYSTEM;  Surgeon: Germain Vyas MD;  Location:  OR     OPTICAL TRACKING SYSTEM ENDOSCOPIC EXCISION SINUS TUMOR Bilateral 2/2/2023    Procedure: stealth assisted endoscopic endonasal anterior craniofacial resection, nasoseptal flap;  Surgeon: Germain Vyas MD;  Location: [facility] OR     MD BIOPSY NASAL LESION  12/23/2022     PROCURE GRAFT FAT Left 2/2/2023    Procedure: fascia elsy graft, left upper thigh;  Surgeon: Germain Vyas MD;  Location: [facility] OR     RETINAL DETACHMENT SURGERY Left 2019     TONSILLECTOMY      age 6        Family History   Problem Relation Age of Onset     Breast Cancer Mother      Breast Cancer Maternal Aunt      Breast Cancer Maternal  Aunt      Lung Cancer Maternal Aunt      Bladder Cancer Maternal Aunt      Cancer Maternal Uncle         Eye        Social History     Socioeconomic History     Marital status:    Tobacco Use     Smoking status: Never     Smokeless tobacco: Never   Substance and Sexual Activity     Alcohol use: Yes     Comment: social use per patient     Drug use: Never

## 2023-05-29 LAB — BACTERIA SPEC CULT: ABNORMAL

## 2023-05-30 ENCOUNTER — TELEPHONE (OUTPATIENT)
Dept: OTOLARYNGOLOGY | Facility: CLINIC | Age: 53
End: 2023-05-30
Payer: COMMERCIAL

## 2023-05-30 DIAGNOSIS — A49.01 STAPH AUREUS INFECTION: Primary | ICD-10-CM

## 2023-05-30 RX ORDER — CEPHALEXIN 500 MG/1
500 CAPSULE ORAL 4 TIMES DAILY
Qty: 40 CAPSULE | Refills: 0 | Status: SHIPPED | OUTPATIENT
Start: 2023-05-30 | End: 2023-06-09

## 2023-05-30 NOTE — TELEPHONE ENCOUNTER
Signed Prescriptions:                        Disp   Refills    cephALEXin (KEFLEX) 500 MG capsule         40 cap*0        Sig: Take 1 capsule (500 mg) by mouth 4 times daily for 10           days  Authorizing Provider: KT GALARZA  Ordering User: ANDREW SARABIA

## 2023-05-30 NOTE — TELEPHONE ENCOUNTER
----- Message from Germain Vyas MD sent at 5/29/2023  8:59 PM CDT -----  Hi Syeda,   Can we call Rosalind and let her know about her culture results and place her on 10d of keflex 500mg four times daily?  Thanks,  Akash

## 2023-06-01 NOTE — PROGRESS NOTES
Radiotherapy Treatment Summary              PATIENT: Rosalind Murphy  MEDICAL RECORD NO: [id number]   : 1970    DIAGNOSIS / ID: Ms. 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 Recommendation is for 60Gy/30fx without chemotherapy. We did discussed the role of elective david irradiation, particularly given location of tumor and stage     INTENT OF RADIOTHERAPY: Adjuvant    CONCURRENT SYSTEMIC THERAPY: No             SITE OF TREATMENT: Sinonasal, elective lymph nodes    DATES  OF TREATMENT: 3/23/2023 to 5/3/2023    TOTAL DOSE OF TREATMENT / FRACTIONS: 60 Gy/30 fractions                                         FOLLOW UP PLAN:  1. Follow up with Radiation Oncology in 1 month  2. Follow up with Dr. Vyas as scheduled on 2023    CC  Patient Care Team:  Karel Crowley as PCP - General (Internal Medicine)  Germain Vyas MD as MD (Otolaryngology)  Jules Pereira MD as MD (Radiation Oncology)  Darline Trimble RN as Specialty Care Coordinator (Radiation Oncology)  Jules Pereira MD as Assigned Cancer Care Provider  Germain Vyas MD as Assigned Surgical Provider  Nan Be, RN as Registered Nurse (Neurological Surgery)  Nan Be, SARINA as Registered Nurse (Neurological Surgery)  Abhi Tidwell MD as Assigned Neuroscience Provider       Jules Pereira M.D.  Department of Radiation Oncology  [facility]

## 2023-06-02 ENCOUNTER — TELEPHONE (OUTPATIENT)
Dept: OTOLARYNGOLOGY | Facility: CLINIC | Age: 53
End: 2023-06-02
Payer: COMMERCIAL

## 2023-06-02 DIAGNOSIS — A49.01 STAPH AUREUS INFECTION: Primary | ICD-10-CM

## 2023-06-02 LAB — BACTERIA SPEC CULT: NORMAL

## 2023-06-02 NOTE — TELEPHONE ENCOUNTER
Writer juan for patient informing her of the script for gentamycin nasal rinse being sent to the compounding pharmacy.    Syeda Jenkins RN on 6/2/2023 at 8:24 AM

## 2023-06-12 ENCOUNTER — MYC MEDICAL ADVICE (OUTPATIENT)
Dept: OTOLARYNGOLOGY | Facility: CLINIC | Age: 53
End: 2023-06-12
Payer: COMMERCIAL

## 2023-06-12 RX ORDER — CEPHALEXIN 500 MG/1
500 CAPSULE ORAL 4 TIMES DAILY
Qty: 28 CAPSULE | Refills: 0 | Status: SHIPPED | OUTPATIENT
Start: 2023-06-12 | End: 2023-06-19

## 2023-06-12 NOTE — TELEPHONE ENCOUNTER
Andrew Jenkins RN on 6/12/2023 at 3:58 PM     Signed Prescriptions:                        Disp   Refills    cephALEXin (KEFLEX) 500 MG capsule         28 cap*0        Sig: Take 1 capsule (500 mg) by mouth 4 times daily for 7           days  Authorizing Provider: KT GALARZA  Ordering User: ANDREW JENKINS

## 2023-06-21 ENCOUNTER — OFFICE VISIT (OUTPATIENT)
Dept: OTOLARYNGOLOGY | Facility: CLINIC | Age: 53
End: 2023-06-21
Payer: COMMERCIAL

## 2023-06-21 ENCOUNTER — TELEPHONE (OUTPATIENT)
Dept: OTOLARYNGOLOGY | Facility: CLINIC | Age: 53
End: 2023-06-21
Payer: COMMERCIAL

## 2023-06-21 VITALS
HEART RATE: 90 BPM | WEIGHT: 175 LBS | DIASTOLIC BLOOD PRESSURE: 74 MMHG | SYSTOLIC BLOOD PRESSURE: 115 MMHG | HEIGHT: 68 IN | BODY MASS INDEX: 26.52 KG/M2

## 2023-06-21 DIAGNOSIS — C80.1 ADENOCARCINOMA (H): ICD-10-CM

## 2023-06-21 DIAGNOSIS — J01.01 ACUTE RECURRENT MAXILLARY SINUSITIS: ICD-10-CM

## 2023-06-21 DIAGNOSIS — J34.89 NASAL OBSTRUCTION: Primary | ICD-10-CM

## 2023-06-21 PROCEDURE — 31231 NASAL ENDOSCOPY DX: CPT | Performed by: OTOLARYNGOLOGY

## 2023-06-21 PROCEDURE — 99213 OFFICE O/P EST LOW 20 MIN: CPT | Mod: 25 | Performed by: OTOLARYNGOLOGY

## 2023-06-21 RX ORDER — EUCALYPTUS/PEPPERMINT OIL
2 SOLUTION, NON-ORAL NASAL 3 TIMES DAILY
Qty: 30 ML | Refills: 3 | Status: SHIPPED | OUTPATIENT
Start: 2023-06-21 | End: 2024-08-14

## 2023-06-21 ASSESSMENT — PAIN SCALES - GENERAL: PAINLEVEL: NO PAIN (0)

## 2023-06-21 NOTE — LETTER
"6/21/2023       RE: Rosalind Murphy  [address] [facility] [address]     Dear Colleague,    Thank you for referring your patient, Rosalind Murphy, to the [facility] [place] at [facility]. Please see a copy of my visit note below.      [facility]  Return Visit  Encounter date:   May 26, 2023    Chief Complaint:   Follow-up     ID: s/p second stage surgery as below, left sinonasal adenocarcinoma    Interval History:   Rosalind Murphy is a 53 year old female with history of pT3cN0 left sinonasal adenocarcinoma who presents for follow up. She started radiation with Dr. Pereira and completed 30/30 fractrions on 5/3/23.     Returns for follow-up  Concerned about residual infection in the surgical cavity  Also complains of some oral cavity dryness and difficulty eating  Also complains of issues with temperature regulation    Sino-Nasal Outcome Test (SNOT - 22)  DNT    [facility]  Procedure Date: 02/02/2023     PREOPERATIVE DIAGNOSES:    1.  Intestinal type sinonasal adenocarcinoma.  2.  Nasal obstruction.     PREOPERATIVE DIAGNOSES:    1.  Intestinal type sinonasal adenocarcinoma.  2.  Nasal obstruction.     PROCEDURE PERFORMED:     1.  Endoscopic endonasal transcribriform resection of anterior cranial fossa intradural tumor, left sinonasal adenocarcinoma.  2.  [place] of right-sided pedicled nasoseptal flap pedicled off the posterior septal branch of sphenopalatine artery.     SURGEON:  Germain Vyas MD     CO-SURGEON:  Abhi Tidwell MD    Review of systems: A 14-point review of systems has been conducted and is negative for any notable symptoms, except as dictated in the history of present illness.     Physical Exam:  Vital signs: /74 (BP Location: Left arm, Patient Position: Sitting, Cuff Size: Adult Regular)   Pulse 90   Ht 1.727 m (5' 8\")   Wt 79.4 kg (175 lb)   BMI 26.61 kg/m     General " Appearance: No acute distress, appropriate demeanor, conversant  Eyes: moist conjunctivae; EOMI; pupils symmetric; visual acuity grossly intact; no proptosis  Head: normocephalic; overall symmetric appearance without deformity  Face: overall symmetric without deformity; HB I/VI  Nose: No external deformity; see endoscopy  Lungs: symmetric chest rise; no wheezing  CV: Good distal perfusion; normal hear rate  Extremities: No deformity  Neurologic Exam: Cranial nerves II-XII are grossly intact; no focal deficit     Procedure Note  Procedure performed: Rigid nasal endoscopy   Indication: To evaluate for sinonasal pathology not visualized on routine anterior rhinoscopy  Anesthesia: 4% topical lidocaine with 0.05 % oxymetazoline  Description of procedure: A 30 degree, 3 mm rigid endoscope was inserted into bilateral nasal cavities and the nasal valves, nasal cavity, middle meatus, sphenoethmoid recess, nasopharynx were evaluated for evidence of obstruction, edema, purulence, polyps and/or mass/lesion.       Jia-Imtiaz Endoscopic Scoring System  Endoscopic observation Right Left   Polyps in middle meatus (0 = absent, 1 = restricted to middle meatus, 2 = Beyond middle meatus) 0 0   Discharge (0 = absent, 1 = thin and clear, 2 = thick, purulent) 1 1   Edema (0 = absent, 1 = mild-moderate, 2 = moderate-severe) 0 0   Crusting (0 = absent, 1 = mild-moderate, 2 = moderate-severe) 1 1   Scarring (0= absent, 1 = mild-moderate, 2 = moderate-severe) 0 0   Total 2 2     Findings  RT: Synechia of the right nasal cavity; no signs of infection  LT: Resolution of extensive Staph aureus biofilm formation in the surgical cavity no signs of residual tumor..         The patient tolerated the procedure well without complication.     Laboratory Review:  n/a     Imaging Review:  CT sinus 12/15/2022:  1.  The left nasal cavity is opacified along its mid to posterior   aspect with extension of soft tissue into the left posterior   nasopharynx.  There is questionable remodeling of the posterolateral   wall of the left nasal cavity. Is an underlying lesion at this   location is possible and correlation with direct visualization is   advised.        MRI skull base w and w/o contrast 1/2/2023  IMPRESSION: 3.2 x 1.4 x 3.1 cm left posterior nasal cavity mass  compatible with pathology proven adenocarcinoma..     PET/CT 1/3/2023:  IMPRESSION: In this patient with biopsy-proven left nasal cavity  adenocarcinoma:  1. No evidence of metastasis in the chest, abdomen or pelvis.  2. Please refer to dedicated report for findings in the head and neck  region.     Impression:   1. 2.8 x 2.7 x 1.4 cm hypermetabolic mass in the left posterior nasal  cavity compatible with pathology proven adenocarcinoma.  2. No cervical lymphadenopathy.   3. Please refer to the whole body PET CT performed as a separate  report, for the findings of the remainder of the body.        *I have personally reviewed these images and agree with the radiologist's interpretation*        Pathology Review:  Final Diagnosis 2/2/23  A. LEFT POSTERIOR ETHMOID, EXCISIONAL BIOPSY:  -Benign sinonasal mucosa with chronic inflammation and focal calcifications.  B. LEFT ANTERIOR ETHMOID, EXCISIONAL BIOPSY:  -Benign bone and sinonasal mucosa with chronic inflammation.  C. LEFT LATERAL NASAL WALL, BIOPSY:  -Benign sinonasal mucosa with chronic inflammation  D. RIGHT OLFACTORY CLEFT, BIOPSY:  -Benign sinonasal mucosa with chronic inflammation.  E. LEFT ANTERIOR SEPTAL MARGIN, EXCISION:  -Benign sinonasal mucosa with chronic inflammation.  F. LEFT INFERIOR SEPTAL MARGIN, EXCISION:  -Benign sinonasal mucosa with chronic inflammation  G. LEFT SPHENOID ROSTRUM, EXCISION:  -Benign sinonasal mucosa with chronic inflammation  H. LEFT OLFACTORY CLEFT, BIOPSY:  -Benign fibroconnective tissue and bone.  I. LEFT OLFACTORY CLEFT #2, BIOPSY:  -FOCI OF RESIDUAL INTESTINAL-TYPE ADENOCARCINOMA in fragments of fibroconnective and  mucosal tissue with chronic  inflammation.  J. LEFT OLFACTORY #3, BIOPSY:  -Negative for malignancy.  K. POSTERIOR DURAL MARGIN, BIOPSY:  -Scant fragments of fibroconnective tissue, negative for malignancy.  L. ANTERIOR DURAL MARGIN, BIOPSY:  -Small fragment of fibroconnective and nerve tissue, negative for malignancy.  M. MEDIAL DURAL MARGIN, BIOPSY:  -Small fragment of dense fibrous tissue, negative for malignancy.  N. LATERAL DURAL MARGIN, BIOPSY:  -Small fragment of dense fibrous tissue, negative for malignancy.  O. POSTERIOR NERVE OLFACTORY MARGIN, EXCISION:  -Small fragment of neural type tissue, negative for malignancy.        Final Diagnosis 1/17/23  A. NOSE, LEFT SINONASAL TUMOR, EXCISION:  - SINONASAL ADENOCARCINOMA, INTESTINAL TYPE  - See comment  B. NOSE, LEFT MIDDLE TURBINATE, EXCISION:  - Fragments of benign respiratory mucosa and lamellar bone  - No evidence of malignancy  C. NOSE, LEFT OLFACTORY CLEFT, EXCISION:  - SINONASAL ADENOCARCINOMA, INTESTINAL TYPE        Nasal mass biopsy - 12/23/2022  Final Diagnosis        NASAL MASS, BIOPSY:   1. Moderately-differentiated adenocarcinoma, favor     sinonasal adenocarcinoma, non-intestinal type         Assessment/Medical Decision Making:  Left olfactory cleft ITAC  S/p stage 1 surgery  S/p stage 2 surgery 2/2/23  Nasal obstruction secondary to above  Atypical facial pain  Anosmia and Ageusia      Plan:  Bilateral endoscopy is performed today.  No signs of infection.  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  We discussed treatment of this after her posttreatment imaging.    We also discussed usage of nose cones to help with nasal breathing at night.  We will also start Ponaris to help with nasal dryness.    Additional counseling was also performed in hopes of improving some of the symptom burden related to treatment.  She will see her PCP soon and have thyroid testing performed.    Speech therapy  consult offered given difficulty with solid foods and excessive oral cavity dryness, but this was deferred at this time.    Germain Vyas MD    [facility]  Rhinology, Endoscopic Skull Base Surgery  [facility]  Department of Otolaryngology - Head & Neck Surgery    The above documentation was completed with the aid of voice recognition dictation software, and as a result, unexpected dictation errors may occur. Please don't hesitate to contact me for any clarification needed regarding this note.       Additional portions of the patient's history have been reviewed below.   ~~~~~~~~~~~~~~~~~~~~~~~~~~~~~~~~~~~~~~~~~~~~~~~~~~~~~~~~~~~~~~~~~~~~~~~~~~~~~~~~~~~~~~~~~~~~~~~~~~~~~~~~~~~~~~~~~~~~~~~~~~~~~~~~~~~~~~~    Past Medical History:   Diagnosis Date    History of hysterectomy 03/06/2015    Hypothyroidism     Motion sickness     PONV (postoperative nausea and vomiting)     Primary adenocarcinoma of nasal cavity (H) 12/23/2022    S/P radiation therapy     6,000 cGy to sinonasal completed on 5/3/2023 [facility]        Past Surgical History:   Procedure Laterality Date    CATARACT IOL, RT/LT Left     as a child    HYSTERECTOMY  2015    LAPAROTOMY EXPLORATORY  2015    OPTICAL TRACKING SYSTEM ENDOSCOPIC EXCISION SINUS TUMOR Left 1/17/2023    Procedure: EXCISION, NEOPLASM, PARANASAL SINUS, ENDOSCOPIC, USING OPTICAL TRACKING SYSTEM;  Surgeon: Germain Vyas MD;  Location:  OR    OPTICAL TRACKING SYSTEM ENDOSCOPIC EXCISION SINUS TUMOR Bilateral 2/2/2023    Procedure: stealth assisted endoscopic endonasal anterior craniofacial resection, nasoseptal flap;  Surgeon: Germain Vyas MD;  Location:  OR    NM BIOPSY NASAL LESION  12/23/2022    PROCURE GRAFT FAT Left 2/2/2023    Procedure: fascia elsy graft, left upper thigh;  Surgeon: Germain Vyas MD;  Location: [facility] OR    RETINAL DETACHMENT SURGERY Left 2019    TONSILLECTOMY      age 6        Family  History   Problem Relation Age of Onset    Breast Cancer Mother     Breast Cancer Maternal Aunt     Breast Cancer Maternal Aunt     Lung Cancer Maternal Aunt     Bladder Cancer Maternal Aunt     Cancer Maternal Uncle         Eye        Social History     Socioeconomic History    Marital status:    Tobacco Use    Smoking status: Never    Smokeless tobacco: Never   Substance and Sexual Activity    Alcohol use: Yes     Comment: social use per patient    Drug use: Never           Again, thank you for allowing me to participate in the care of your patient.      Sincerely,    Germain Vyas MD

## 2023-06-21 NOTE — TELEPHONE ENCOUNTER
This writer called patient to offer follow up appointment this week. LVM for patient and asked for call back supplying this writer's direct call back as well as RN care coordinator Syeda's direct call back.    Scheduling follow up with patient per provider for ongoing symptoms.    NARA WEN LPN on 6/21/2023 at 9:13 AM

## 2023-06-22 NOTE — PROGRESS NOTES
"  [facility]  Return Visit  Encounter date:   May 26, 2023    Chief Complaint:   Follow-up     ID: s/p second stage surgery as below, left sinonasal adenocarcinoma    Interval History:   Rosalind Murphy is a 53 year old female with history of pT3cN0 left sinonasal adenocarcinoma who presents for follow up. She started radiation with Dr. Pereira and completed 30/30 fractrions on 5/3/23.     Returns for follow-up  Concerned about residual infection in the surgical cavity  Also complains of some oral cavity dryness and difficulty eating  Also complains of issues with temperature regulation    Sino-Nasal Outcome Test (SNOT - 22)  DNT    [place] [facility]  Procedure Date: 02/02/2023     PREOPERATIVE DIAGNOSES:    1.  Intestinal type sinonasal adenocarcinoma.  2.  Nasal obstruction.     PREOPERATIVE DIAGNOSES:    1.  Intestinal type sinonasal adenocarcinoma.  2.  Nasal obstruction.     PROCEDURE PERFORMED:     1.  Endoscopic endonasal transcribriform resection of anterior cranial fossa intradural tumor, left sinonasal adenocarcinoma.  2.  [place] of right-sided pedicled nasoseptal flap pedicled off the posterior septal branch of sphenopalatine artery.     SURGEON:  Germain Vyas MD     CO-SURGEON:  Abhi Tidwell MD    Review of systems: A 14-point review of systems has been conducted and is negative for any notable symptoms, except as dictated in the history of present illness.     Physical Exam:  Vital signs: /74 (BP Location: Left arm, Patient Position: Sitting, Cuff Size: Adult Regular)   Pulse 90   Ht 1.727 m (5' 8\")   Wt 79.4 kg (175 lb)   BMI 26.61 kg/m     General Appearance: No acute distress, appropriate demeanor, conversant  Eyes: moist conjunctivae; EOMI; pupils symmetric; visual acuity grossly intact; no proptosis  Head: normocephalic; overall symmetric appearance without deformity  Face: overall symmetric without deformity; HB I/VI  Nose: No external deformity; see " endoscopy  Lungs: symmetric chest rise; no wheezing  CV: Good distal perfusion; normal hear rate  Extremities: No deformity  Neurologic Exam: Cranial nerves II-XII are grossly intact; no focal deficit     Procedure Note  Procedure performed: Rigid nasal endoscopy   Indication: To evaluate for sinonasal pathology not visualized on routine anterior rhinoscopy  Anesthesia: 4% topical lidocaine with 0.05 % oxymetazoline  Description of procedure: A 30 degree, 3 mm rigid endoscope was inserted into bilateral nasal cavities and the nasal valves, nasal cavity, middle meatus, sphenoethmoid recess, nasopharynx were evaluated for evidence of obstruction, edema, purulence, polyps and/or mass/lesion.       Jia-Imtiaz Endoscopic Scoring System  Endoscopic observation Right Left   Polyps in middle meatus (0 = absent, 1 = restricted to middle meatus, 2 = Beyond middle meatus) 0 0   Discharge (0 = absent, 1 = thin and clear, 2 = thick, purulent) 1 1   Edema (0 = absent, 1 = mild-moderate, 2 = moderate-severe) 0 0   Crusting (0 = absent, 1 = mild-moderate, 2 = moderate-severe) 1 1   Scarring (0= absent, 1 = mild-moderate, 2 = moderate-severe) 0 0   Total 2 2     Findings  RT: Synechia of the right nasal cavity; no signs of infection  LT: Resolution of extensive Staph aureus biofilm formation in the surgical cavity no signs of residual tumor..         The patient tolerated the procedure well without complication.     Laboratory Review:  n/a     Imaging Review:  CT sinus 12/15/2022:  1.  The left nasal cavity is opacified along its mid to posterior   aspect with extension of soft tissue into the left posterior   nasopharynx. There is questionable remodeling of the posterolateral   wall of the left nasal cavity. Is an underlying lesion at this   location is possible and correlation with direct visualization is   advised.        MRI skull base w and w/o contrast 1/2/2023  IMPRESSION: 3.2 x 1.4 x 3.1 cm left posterior nasal cavity  mass  compatible with pathology proven adenocarcinoma..     PET/CT 1/3/2023:  IMPRESSION: In this patient with biopsy-proven left nasal cavity  adenocarcinoma:  1. No evidence of metastasis in the chest, abdomen or pelvis.  2. Please refer to dedicated report for findings in the head and neck  region.     Impression:   1. 2.8 x 2.7 x 1.4 cm hypermetabolic mass in the left posterior nasal  cavity compatible with pathology proven adenocarcinoma.  2. No cervical lymphadenopathy.   3. Please refer to the whole body PET CT performed as a separate  report, for the findings of the remainder of the body.        *I have personally reviewed these images and agree with the radiologist's interpretation*        Pathology Review:  Final Diagnosis 2/2/23  A. LEFT POSTERIOR ETHMOID, EXCISIONAL BIOPSY:  -Benign sinonasal mucosa with chronic inflammation and focal calcifications.  B. LEFT ANTERIOR ETHMOID, EXCISIONAL BIOPSY:  -Benign bone and sinonasal mucosa with chronic inflammation.  C. LEFT LATERAL NASAL WALL, BIOPSY:  -Benign sinonasal mucosa with chronic inflammation  D. RIGHT OLFACTORY CLEFT, BIOPSY:  -Benign sinonasal mucosa with chronic inflammation.  E. LEFT ANTERIOR SEPTAL MARGIN, EXCISION:  -Benign sinonasal mucosa with chronic inflammation.  F. LEFT INFERIOR SEPTAL MARGIN, EXCISION:  -Benign sinonasal mucosa with chronic inflammation  G. LEFT SPHENOID ROSTRUM, EXCISION:  -Benign sinonasal mucosa with chronic inflammation  H. LEFT OLFACTORY CLEFT, BIOPSY:  -Benign fibroconnective tissue and bone.  I. LEFT OLFACTORY CLEFT #2, BIOPSY:  -FOCI OF RESIDUAL INTESTINAL-TYPE ADENOCARCINOMA in fragments of fibroconnective and mucosal tissue with chronic  inflammation.  J. LEFT OLFACTORY #3, BIOPSY:  -Negative for malignancy.  K. POSTERIOR DURAL MARGIN, BIOPSY:  -Scant fragments of fibroconnective tissue, negative for malignancy.  L. ANTERIOR DURAL MARGIN, BIOPSY:  -Small fragment of fibroconnective and nerve tissue, negative for  malignancy.  M. MEDIAL DURAL MARGIN, BIOPSY:  -Small fragment of dense fibrous tissue, negative for malignancy.  N. LATERAL DURAL MARGIN, BIOPSY:  -Small fragment of dense fibrous tissue, negative for malignancy.  O. POSTERIOR NERVE OLFACTORY MARGIN, EXCISION:  -Small fragment of neural type tissue, negative for malignancy.        Final Diagnosis 1/17/23  A. NOSE, LEFT SINONASAL TUMOR, EXCISION:  - SINONASAL ADENOCARCINOMA, INTESTINAL TYPE  - See comment  B. NOSE, LEFT MIDDLE TURBINATE, EXCISION:  - Fragments of benign respiratory mucosa and lamellar bone  - No evidence of malignancy  C. NOSE, LEFT OLFACTORY CLEFT, EXCISION:  - SINONASAL ADENOCARCINOMA, INTESTINAL TYPE        Nasal mass biopsy - 12/23/2022  Final Diagnosis        NASAL MASS, BIOPSY:   1. Moderately-differentiated adenocarcinoma, favor     sinonasal adenocarcinoma, non-intestinal type         Assessment/Medical Decision Making:  Left olfactory cleft ITAC  S/p stage 1 surgery  S/p stage 2 surgery 2/2/23  Nasal obstruction secondary to above  Atypical facial pain  Anosmia and Ageusia      Plan:  Bilateral endoscopy is performed today.  No signs of infection.  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  We discussed treatment of this after her posttreatment imaging.    We also discussed usage of nose cones to help with nasal breathing at night.  We will also start Ponaris to help with nasal dryness.    Additional counseling was also performed in hopes of improving some of the symptom burden related to treatment.  She will see her PCP soon and have thyroid testing performed.    Speech therapy consult offered given difficulty with solid foods and excessive oral cavity dryness, but this was deferred at this time.    Germain Vyas MD    [facility]  Rhinology, Endoscopic Skull Base Surgery  [facility]  Department of Otolaryngology - Head & Neck  Surgery    The above documentation was completed with the aid of voice recognition dictation software, and as a result, unexpected dictation errors may occur. Please don't hesitate to contact me for any clarification needed regarding this note.       Additional portions of the patient's history have been reviewed below.   ~~~~~~~~~~~~~~~~~~~~~~~~~~~~~~~~~~~~~~~~~~~~~~~~~~~~~~~~~~~~~~~~~~~~~~~~~~~~~~~~~~~~~~~~~~~~~~~~~~~~~~~~~~~~~~~~~~~~~~~~~~~~~~~~~~~~~~~    Past Medical History:   Diagnosis Date     History of hysterectomy 03/06/2015     Hypothyroidism      Motion sickness      PONV (postoperative nausea and vomiting)      Primary adenocarcinoma of nasal cavity (H) 12/23/2022     S/P radiation therapy     6,000 cGy to sinonasal completed on 5/3/2023 [facility]        Past Surgical History:   Procedure Laterality Date     CATARACT IOL, RT/LT Left     as a child     HYSTERECTOMY  2015     LAPAROTOMY EXPLORATORY  2015     OPTICAL TRACKING SYSTEM ENDOSCOPIC EXCISION SINUS TUMOR Left 1/17/2023    Procedure: EXCISION, NEOPLASM, PARANASAL SINUS, ENDOSCOPIC, USING OPTICAL TRACKING SYSTEM;  Surgeon: Germain Vyas MD;  Location:  OR     OPTICAL TRACKING SYSTEM ENDOSCOPIC EXCISION SINUS TUMOR Bilateral 2/2/2023    Procedure: stealth assisted endoscopic endonasal anterior craniofacial resection, nasoseptal flap;  Surgeon: Germain Vyas MD;  Location:  OR     ID BIOPSY NASAL LESION  12/23/2022     PROCURE GRAFT FAT Left 2/2/2023    Procedure: fascia elsy graft, left upper thigh;  Surgeon: Germain Vyas MD;  Location: [facility] OR     RETINAL DETACHMENT SURGERY Left 2019     TONSILLECTOMY      age 6        Family History   Problem Relation Age of Onset     Breast Cancer Mother      Breast Cancer Maternal Aunt      Breast Cancer Maternal Aunt      Lung Cancer Maternal Aunt      Bladder Cancer Maternal Aunt      Cancer Maternal Uncle         Eye        Social History     Socioeconomic History      Marital status:    Tobacco Use     Smoking status: Never     Smokeless tobacco: Never   Substance and Sexual Activity     Alcohol use: Yes     Comment: social use per patient     Drug use: Never

## 2023-07-03 ENCOUNTER — TELEPHONE (OUTPATIENT)
Dept: OTOLARYNGOLOGY | Facility: CLINIC | Age: 53
End: 2023-07-03
Payer: COMMERCIAL

## 2023-07-07 ENCOUNTER — OFFICE VISIT (OUTPATIENT)
Dept: OTOLARYNGOLOGY | Facility: CLINIC | Age: 53
End: 2023-07-07
Payer: COMMERCIAL

## 2023-07-07 ENCOUNTER — OFFICE VISIT (OUTPATIENT)
Dept: RADIATION ONCOLOGY | Facility: CLINIC | Age: 53
End: 2023-07-07
Payer: COMMERCIAL

## 2023-07-07 VITALS
DIASTOLIC BLOOD PRESSURE: 63 MMHG | BODY MASS INDEX: 25.61 KG/M2 | WEIGHT: 169 LBS | HEIGHT: 68 IN | SYSTOLIC BLOOD PRESSURE: 105 MMHG | TEMPERATURE: 98 F | HEART RATE: 71 BPM

## 2023-07-07 VITALS
SYSTOLIC BLOOD PRESSURE: 105 MMHG | WEIGHT: 169.5 LBS | HEART RATE: 71 BPM | DIASTOLIC BLOOD PRESSURE: 63 MMHG | RESPIRATION RATE: 18 BRPM | TEMPERATURE: 98 F | BODY MASS INDEX: 25.77 KG/M2

## 2023-07-07 DIAGNOSIS — J34.89 NASAL OBSTRUCTION: Primary | ICD-10-CM

## 2023-07-07 DIAGNOSIS — G50.1 ATYPICAL FACIAL PAIN: ICD-10-CM

## 2023-07-07 DIAGNOSIS — C80.1 ADENOCARCINOMA (H): ICD-10-CM

## 2023-07-07 DIAGNOSIS — C30.0 PRIMARY ADENOCARCINOMA OF NASAL CAVITY (H): Primary | ICD-10-CM

## 2023-07-07 PROCEDURE — 99213 OFFICE O/P EST LOW 20 MIN: CPT | Mod: 25 | Performed by: OTOLARYNGOLOGY

## 2023-07-07 PROCEDURE — 31231 NASAL ENDOSCOPY DX: CPT | Performed by: OTOLARYNGOLOGY

## 2023-07-07 PROCEDURE — 99024 POSTOP FOLLOW-UP VISIT: CPT | Performed by: SURGERY

## 2023-07-07 ASSESSMENT — PAIN SCALES - GENERAL
PAINLEVEL: MODERATE PAIN (4)
PAINLEVEL: NO PAIN (0)

## 2023-07-07 NOTE — LETTER
"7/7/2023       RE: Rosalind Murphy  [id number] [address] [id number]     Dear Colleague,    Thank you for referring your patient, Rosalind Murphy, to the [facility]. Please see a copy of my visit note below.      [facility]  Return Visit  Encounter date:   July 7, 2023    Chief Complaint:   Follow-up     ID: s/p second stage surgery as below, left sinonasal adenocarcinoma    Interval History:   Rosalind Murphy is a 53 year old female with history of pT3cN0 left sinonasal adenocarcinoma who presents for follow up. She started radiation with Dr. Pereira and completed 30/30 fractrions on 5/3/23.     Returns for follow-up  No infectious symptoms but nasal congestion persists  Slowly getting energy level back  Still not finding food enjoyable      Sino-Nasal Outcome Test (SNOT - 22)  [facility] Operative History  Procedure Date: 02/02/2023     PREOPERATIVE DIAGNOSES:    1.  Intestinal type sinonasal adenocarcinoma.  2.  Nasal obstruction.     PREOPERATIVE DIAGNOSES:    1.  Intestinal type sinonasal adenocarcinoma.  2.  Nasal obstruction.     PROCEDURE PERFORMED:     1.  Endoscopic endonasal transcribriform resection of anterior cranial fossa intradural tumor, left sinonasal adenocarcinoma.  2.  [place] of right-sided pedicled nasoseptal flap pedicled off the posterior septal branch of sphenopalatine artery.     SURGEON:  Germain Vyas MD     CO-SURGEON:  Abhi Tidwell MD    Review of systems: A 14-point review of systems has been conducted and is negative for any notable symptoms, except as dictated in the history of present illness.     Physical Exam:  Vital signs: /63   Pulse 71   Temp 98  F (36.7  C)   Ht 1.727 m (5' 8\")   Wt 76.7 kg (169 lb)   BMI 25.70 kg/m     General Appearance: No acute distress, appropriate demeanor, conversant  Eyes: moist conjunctivae; EOMI; pupils " symmetric; visual acuity grossly intact; no proptosis  Head: normocephalic; overall symmetric appearance without deformity  Face: overall symmetric without deformity; HB I/VI  Nose: No external deformity; see endoscopy  Lungs: symmetric chest rise; no wheezing  CV: Good distal perfusion; normal hear rate  Extremities: No deformity  Neurologic Exam: Cranial nerves II-XII are grossly intact; no focal deficit     Procedure Note  Procedure performed: Rigid nasal endoscopy   Indication: To evaluate for sinonasal pathology not visualized on routine anterior rhinoscopy  Anesthesia: 4% topical lidocaine with 0.05 % oxymetazoline  Description of procedure: A 30 degree, 3 mm rigid endoscope was inserted into bilateral nasal cavities and the nasal valves, nasal cavity, middle meatus, sphenoethmoid recess, nasopharynx were evaluated for evidence of obstruction, edema, purulence, polyps and/or mass/lesion.       Jia-Imtiaz Endoscopic Scoring System  Endoscopic observation Right Left   Polyps in middle meatus (0 = absent, 1 = restricted to middle meatus, 2 = Beyond middle meatus) 0 0   Discharge (0 = absent, 1 = thin and clear, 2 = thick, purulent) 1 1   Edema (0 = absent, 1 = mild-moderate, 2 = moderate-severe) 0 0   Crusting (0 = absent, 1 = mild-moderate, 2 = moderate-severe) 1 1   Scarring (0= absent, 1 = mild-moderate, 2 = moderate-severe) 0 0   Total 2 2     Findings  RT: Synechia of the right nasal cavity; no signs of infection  LT: Resolution of extensive Staph aureus biofilm formation in the surgical cavity no signs of residual tumor. Some staph biofilm at entrance of nasal cavity        The patient tolerated the procedure well without complication.     Laboratory Review:  n/a     Imaging Review:  CT sinus 12/15/2022:  1.  The left nasal cavity is opacified along its mid to posterior   aspect with extension of soft tissue into the left posterior   nasopharynx. There is questionable remodeling of the posterolateral    wall of the left nasal cavity. Is an underlying lesion at this   location is possible and correlation with direct visualization is   advised.        MRI skull base w and w/o contrast 1/2/2023  IMPRESSION: 3.2 x 1.4 x 3.1 cm left posterior nasal cavity mass  compatible with pathology proven adenocarcinoma..     PET/CT 1/3/2023:  IMPRESSION: In this patient with biopsy-proven left nasal cavity  adenocarcinoma:  1. No evidence of metastasis in the chest, abdomen or pelvis.  2. Please refer to dedicated report for findings in the head and neck  region.     Impression:   1. 2.8 x 2.7 x 1.4 cm hypermetabolic mass in the left posterior nasal  cavity compatible with pathology proven adenocarcinoma.  2. No cervical lymphadenopathy.   3. Please refer to the whole body PET CT performed as a separate  report, for the findings of the remainder of the body.        *I have personally reviewed these images and agree with the radiologist's interpretation*        Pathology Review:  Final Diagnosis 2/2/23  A. LEFT POSTERIOR ETHMOID, EXCISIONAL BIOPSY:  -Benign sinonasal mucosa with chronic inflammation and focal calcifications.  B. LEFT ANTERIOR ETHMOID, EXCISIONAL BIOPSY:  -Benign bone and sinonasal mucosa with chronic inflammation.  C. LEFT LATERAL NASAL WALL, BIOPSY:  -Benign sinonasal mucosa with chronic inflammation  D. RIGHT OLFACTORY CLEFT, BIOPSY:  -Benign sinonasal mucosa with chronic inflammation.  E. LEFT ANTERIOR SEPTAL MARGIN, EXCISION:  -Benign sinonasal mucosa with chronic inflammation.  F. LEFT INFERIOR SEPTAL MARGIN, EXCISION:  -Benign sinonasal mucosa with chronic inflammation  G. LEFT SPHENOID ROSTRUM, EXCISION:  -Benign sinonasal mucosa with chronic inflammation  H. LEFT OLFACTORY CLEFT, BIOPSY:  -Benign fibroconnective tissue and bone.  I. LEFT OLFACTORY CLEFT #2, BIOPSY:  -FOCI OF RESIDUAL INTESTINAL-TYPE ADENOCARCINOMA in fragments of fibroconnective and mucosal tissue with chronic  inflammation.  J. LEFT  OLFACTORY #3, BIOPSY:  -Negative for malignancy.  K. POSTERIOR DURAL MARGIN, BIOPSY:  -Scant fragments of fibroconnective tissue, negative for malignancy.  L. ANTERIOR DURAL MARGIN, BIOPSY:  -Small fragment of fibroconnective and nerve tissue, negative for malignancy.  M. MEDIAL DURAL MARGIN, BIOPSY:  -Small fragment of dense fibrous tissue, negative for malignancy.  N. LATERAL DURAL MARGIN, BIOPSY:  -Small fragment of dense fibrous tissue, negative for malignancy.  O. POSTERIOR NERVE OLFACTORY MARGIN, EXCISION:  -Small fragment of neural type tissue, negative for malignancy.        Final Diagnosis 1/17/23  A. NOSE, LEFT SINONASAL TUMOR, EXCISION:  - SINONASAL ADENOCARCINOMA, INTESTINAL TYPE  - See comment  B. NOSE, LEFT MIDDLE TURBINATE, EXCISION:  - Fragments of benign respiratory mucosa and lamellar bone  - No evidence of malignancy  C. NOSE, LEFT OLFACTORY CLEFT, EXCISION:  - SINONASAL ADENOCARCINOMA, INTESTINAL TYPE        Nasal mass biopsy - 12/23/2022  Final Diagnosis        NASAL MASS, BIOPSY:   1. Moderately-differentiated adenocarcinoma, favor     sinonasal adenocarcinoma, non-intestinal type         Assessment/Medical Decision Making:  Left olfactory cleft ITAC  S/p stage 1 surgery  S/p stage 2 surgery 2/2/23  Nasal obstruction secondary to above  Atypical facial pain  Anosmia and Ageusia      Plan:  Bilateral endoscopy is performed today.  No signs of infection.  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  We discussed treatment of this after her posttreatment imaging, but this is not mandatory.    We also discussed usage of nose cones to help with nasal breathing at night.  Continue Ponaris to help with nasal dryness. May also try muporicin for burden of staph biofilm  May also try to reduce frequency of irrigation to reduce nasal dryness    Follow up after surveillance imaging, then we can transition care to skull base colleague    Germain Vyas,  MD    [facility]  Rhinology, Endoscopic Skull Base Surgery  [facility]  Department of Otolaryngology - Head & Neck Surgery    The above documentation was completed with the aid of voice recognition dictation software, and as a result, unexpected dictation errors may occur. Please don't hesitate to contact me for any clarification needed regarding this note.       Additional portions of the patient's history have been reviewed below.   ~~~~~~~~~~~~~~~~~~~~~~~~~~~~~~~~~~~~~~~~~~~~~~~~~~~~~~~~~~~~~~~~~~~~~~~~~~~~~~~~~~~~~~~~~~~~~~~~~~~~~~~~~~~~~~~~~~~~~~~~~~~~~~~~~~~~~~~    Past Medical History:   Diagnosis Date    History of hysterectomy 03/06/2015    Hypothyroidism     Motion sickness     PONV (postoperative nausea and vomiting)     Primary adenocarcinoma of nasal cavity (H) 12/23/2022    S/P radiation therapy     6,000 cGy to sinonasal completed on 5/3/2023 [facility]        Past Surgical History:   Procedure Laterality Date    CATARACT IOL, RT/LT Left     as a child    HYSTERECTOMY  2015    LAPAROTOMY EXPLORATORY  2015    OPTICAL TRACKING SYSTEM ENDOSCOPIC EXCISION SINUS TUMOR Left 1/17/2023    Procedure: EXCISION, NEOPLASM, PARANASAL SINUS, ENDOSCOPIC, USING OPTICAL TRACKING SYSTEM;  Surgeon: Germain Vyas MD;  Location:  OR    OPTICAL TRACKING SYSTEM ENDOSCOPIC EXCISION SINUS TUMOR Bilateral 2/2/2023    Procedure: stealth assisted endoscopic endonasal anterior craniofacial resection, nasoseptal flap;  Surgeon: Germain Vyas MD;  Location: [facility] OR    MT BIOPSY NASAL LESION  12/23/2022    PROCURE GRAFT FAT Left 2/2/2023    Procedure: fascia elsy graft, left upper thigh;  Surgeon: Germain Vyas MD;  Location: [facility] OR    RETINAL DETACHMENT SURGERY Left 2019    TONSILLECTOMY      age 6        Family History   Problem Relation Age of Onset    Breast Cancer Mother     Breast Cancer Maternal Aunt     Breast Cancer Maternal Aunt     Lung Cancer  Maternal Aunt     Bladder Cancer Maternal Aunt     Cancer Maternal Uncle         Eye        Social History     Socioeconomic History    Marital status:    Tobacco Use    Smoking status: Never    Smokeless tobacco: Never   Substance and Sexual Activity    Alcohol use: Yes     Comment: social use per patient    Drug use: Never           Again, thank you for allowing me to participate in the care of your patient.      Sincerely,    Germain Vyas MD

## 2023-07-07 NOTE — NURSING NOTE
RADIATION ONCOLOGY FOLLOW-UP VISIT    Patient Name: Rosalind Murphy      : 1970     Age: 53 year old        ______________________________________________________________________________       Chief Complaint   Patient presents with     Radiation Therapy     Radiation Oncology Return visit with Dr. Jules Pereira     /63 (BP Location: Left arm)   Pulse 71   Temp 98  F (36.7  C) (Oral)   Resp 18   Wt 76.9 kg (169 lb 8 oz)   BMI 25.77 kg/m        History of Radiation Treatment:  Site: Sinonasal Cancer  Total Dose: 6,000 cGy  Date Completed: 5/3/2023  Clinic: [facility]  Physician: Dr. Jules Pereira    Pain:  Current history of pain associated with this visit:   Intensity: 2/10, more of a discomfort  Current: intermittent  Location: Nasal cavity      Labs:  Other Labs: No    Imaging:  MRI: 2023 Skull base  PET Scan 2023    Fatigue:   Grade 0: No toxicity    Skin:  No Concerns  Lotions/Creams: Aquaphor      ENT:  Dr. Vyas      Future Appointments:      Dr. Vyas Appointment Date: 2023                Additional Instructions:  Follow up after PET and MRI on 2023    Nurse face-to-face time: Level 3:  10 min face to face time    Toña Madrid RN

## 2023-07-07 NOTE — PROGRESS NOTES
Department of Radiation Oncology  [facility]    Health: Cancer Center  [facility]  [address] [place] [id number] [phone]       Radiation Oncology Follow-up Visit  2023      Rosalind Murphy  MRN: [id number]   : 1970     DIAGNOSIS / ID: Ms. 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 Recommendation is for 60Gy/30fx without chemotherapy. We did discussed the role of elective david irradiation, particularly given location of tumor and stage     INTENT OF RADIOTHERAPY: Adjuvant     CONCURRENT SYSTEMIC THERAPY: No              SITE OF TREATMENT: Sinonasal, elective lymph nodes     DATES  OF TREATMENT: 3/23/2023 to 5/3/2023    TOTAL DOSE OF TREATMENT / FRACTIONS: 60 Gy/30 fractions    INTERVAL SINCE COMPLETION OF RADIATION THERAPY: 6 weeks    SUBJECTIVE:   Overall, she has improved since completing radiation. She is no longer on steroid or gabapentin. She did recently see Dr. Vyas, without any evidence of recurrence, but did have evidence of bacterial infection requiring antibiotics. She does endorse thick secretions, decrease and in taste and smell but is able to tolerate PO intake and weight is stable.Denies any new focal neurologic deficits. Posterior hair loss has stopped. She is also working with PCP to optimize thyroid levels.     She is pending imaging  On 23 (PET/CT and MRI). As Dr. Vyas is leaving, she is also working on establishing care with another provider in the future.     PHYSICAL EXAM:  /63 (BP Location: Left arm)   Pulse 71   Temp 98  F (36.7  C) (Oral)   Resp 18   Wt 76.9 kg (169 lb 8 oz)   BMI 25.77 kg/m    Gen: Alert,  in NAD  Eyes: PERRL, EOMI, sclera anicteric  HENT     Head: NC/AT     Ears: No external auricular lesions     Nose/sinus: No rhinorrhea or epistaxis     Oral Cavity/Oropharynx: MMM  Neck: Supple, full ROM, no LAD  Pulm: No wheezing, stridor or respiratory distress  CV: Well-perfused, no cyanosis, no pedal edema  Abdominal: Soft, nontender, nondistended, no hepatomegaly  Back: No step-offs or pain to palpation along the thoracolumbar spine, no CVA tenderness  Rectal/: Deferred  Musculoskeletal: Normal bulk and tone   Skin: Normal color and turgor  Neurologic: A/Ox3, CN II-XII intact  Psychiatric: Appropriate mood and affect    LABS AND IMAGING:  Reviewed.    IMPRESSION: Overall, patient is doing well from a radiation acute toxicity perspective.    PLAN:   1. Imaging  On 8/16/23 (PET/CT and MRI)  2. Follow up with ENT (establshing care with new provider, given Dr. Vyas is leaving in the future).  3. Follow up with radiation oncology 9/2023 after imaging and visit with ENT  4. We discussed pilocarpine for xerostomia, but she wishes to hold off for now and will try lemon drops for now    Jules Pereira M.D.  Department of Radiation Oncology  [facility]

## 2023-07-07 NOTE — PATIENT INSTRUCTIONS
Please contact [facility] RN with questions or concerns following today's appointment: [phone].       Please feel free to leave a detailed message if your call is not answered.    If your call is not received before 3:00 PM, it may not be returned until the following business day.    If you are receiving radiation treatment and need assistance after 3:00 PM or on the weekends, please call [phone] and ask to speak to the radiation oncologist on-call.    Thank you!    Darline BRANCH

## 2023-07-07 NOTE — PATIENT INSTRUCTIONS
You were seen in the ENT Clinic today by Dr. Vyas. If you have any questions or concerns after your appointment, please contact us (see below)       2.  Please return to the ENT clinic in 5 weeks.           How to Contact Us:  Send a E-Semble message to your provider. Our team will respond to you via E-Semble. Occasionally, we will need to call you to get further information.  For urgent matters (Monday-Friday), call the ENT Clinic: [phone] and speak with a call center team member - they will route your call appropriately.   If you'd like to speak directly with a nurse, please find our contact information below. We do our best to check voicemail frequently throughout the day, and will work to call you back within 1-2 days. For urgent matters, please use the general clinic phone numbers listed above.        Syeda VALADEZ RN  ENT RN Care Coordinator  Direct: [phone]  Karla PIMENTEL LPN  Direct: [phone]         [facility]  Department of Otolaryngology

## 2023-07-07 NOTE — LETTER
2023         RE: Rosalind Murphy  [address] [facility] [address]        Dear Colleague,    Thank you for referring your patient, Rosalind Murphy, to the [facility] RADIATION ONCOLOG[facility] [place]. Please see a copy of my visit note below.         Department of Radiation Oncology  [facility]: Cancer Center  [facility]  [address] [address] [place] [address] [phone]       Radiation Oncology Follow-up Visit  2023      Rosalind Murphy  MRN: [id number]   : 1970     DIAGNOSIS / ID: Ms. 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 Recommendation is for 60Gy/30fx without chemotherapy. We did discussed the role of elective david irradiation, particularly given location of tumor and stage     INTENT OF RADIOTHERAPY: Adjuvant     CONCURRENT SYSTEMIC THERAPY: No              SITE OF TREATMENT: Sinonasal, elective lymph nodes     DATES  OF TREATMENT: 3/23/2023 to 5/3/2023    TOTAL DOSE OF TREATMENT / FRACTIONS: 60 Gy/30 fractions    INTERVAL SINCE COMPLETION OF RADIATION THERAPY: 6 weeks    SUBJECTIVE:   Overall, she has improved since completing radiation. She is no longer on steroid or gabapentin. She did recently see Dr. Vyas, without any evidence of recurrence, but did have evidence of bacterial infection requiring antibiotics. She does endorse thick secretions, decrease and in taste and smell but is able to tolerate PO intake and weight is stable.Denies any new focal neurologic deficits. Posterior hair loss has stopped. She is also working with PCP to optimize thyroid levels.     She is pending imaging  On 23 (PET/CT and MRI). As Dr. Vyas  is leaving, she is also working on establishing care with another provider in the future.     PHYSICAL EXAM:  /63 (BP Location: Left arm)   Pulse 71   Temp 98  F (36.7  C) (Oral)   Resp 18   Wt 76.9 kg (169 lb 8 oz)   BMI 25.77 kg/m    Gen: Alert, in NAD  Eyes: PERRL, EOMI, sclera anicteric  HENT     Head: NC/AT     Ears: No external auricular lesions     Nose/sinus: No rhinorrhea or epistaxis     Oral Cavity/Oropharynx: MMM  Neck: Supple, full ROM, no LAD  Pulm: No wheezing, stridor or respiratory distress  CV: Well-perfused, no cyanosis, no pedal edema  Abdominal: Soft, nontender, nondistended, no hepatomegaly  Back: No step-offs or pain to palpation along the thoracolumbar spine, no CVA tenderness  Rectal/: Deferred  Musculoskeletal: Normal bulk and tone   Skin: Normal color and turgor  Neurologic: A/Ox3, CN II-XII intact  Psychiatric: Appropriate mood and affect    LABS AND IMAGING:  Reviewed.    IMPRESSION: Overall, patient is doing well from a radiation acute toxicity perspective.    PLAN:   1. Imaging  On 8/16/23 (PET/CT and MRI)  2. Follow up with ENT (establshing care with new provider, given Dr. Vyas is leaving in the future).  3. Follow up with radiation oncology 9/2023 after imaging and visit with ENT  4. We discussed pilocarpine for xerostomia, but she wishes to hold off for now and will try lemon drops for now    Jules Pereira M.D.  Department of Radiation Oncology  [facility]             Again, thank you for allowing me to participate in the care of your patient.        Sincerely,        Jules Pereira MD

## 2023-07-10 ENCOUNTER — TELEPHONE (OUTPATIENT)
Dept: OTOLARYNGOLOGY | Facility: CLINIC | Age: 53
End: 2023-07-10
Payer: COMMERCIAL

## 2023-07-17 NOTE — PROGRESS NOTES
"  [facility]  Return Visit  Encounter date:   July 7, 2023    Chief Complaint:   Follow-up     ID: s/p second stage surgery as below, left sinonasal adenocarcinoma    Interval History:   Rosalind Murphy is a 53 year old female with history of pT3cN0 left sinonasal adenocarcinoma who presents for follow up. She started radiation with Dr. Pereira and completed 30/30 fractrions on 5/3/23.     Returns for follow-up  No infectious symptoms but nasal congestion persists  Slowly getting energy level back  Still not finding food enjoyable      Sino-Nasal Outcome Test (SNOT - 22)  DNT    [place] [facility]  Procedure Date: 02/02/2023     PREOPERATIVE DIAGNOSES:    1.  Intestinal type sinonasal adenocarcinoma.  2.  Nasal obstruction.     PREOPERATIVE DIAGNOSES:    1.  Intestinal type sinonasal adenocarcinoma.  2.  Nasal obstruction.     PROCEDURE PERFORMED:     1.  Endoscopic endonasal transcribriform resection of anterior cranial fossa intradural tumor, left sinonasal adenocarcinoma.  2.  Sanderson of right-sided pedicled nasoseptal flap pedicled off the posterior septal branch of sphenopalatine artery.     SURGEON:  Germain Vyas MD     CO-SURGEON:  Abhi Tidwell MD    Review of systems: A 14-point review of systems has been conducted and is negative for any notable symptoms, except as dictated in the history of present illness.     Physical Exam:  Vital signs: /63   Pulse 71   Temp 98  F (36.7  C)   Ht 1.727 m (5' 8\")   Wt 76.7 kg (169 lb)   BMI 25.70 kg/m     General Appearance: No acute distress, appropriate demeanor, conversant  Eyes: moist conjunctivae; EOMI; pupils symmetric; visual acuity grossly intact; no proptosis  Head: normocephalic; overall symmetric appearance without deformity  Face: overall symmetric without deformity; HB I/VI  Nose: No external deformity; see endoscopy  Lungs: symmetric chest rise; no wheezing  CV: Good distal perfusion; normal hear rate  Extremities: No " deformity  Neurologic Exam: Cranial nerves II-XII are grossly intact; no focal deficit     Procedure Note  Procedure performed: Rigid nasal endoscopy   Indication: To evaluate for sinonasal pathology not visualized on routine anterior rhinoscopy  Anesthesia: 4% topical lidocaine with 0.05 % oxymetazoline  Description of procedure: A 30 degree, 3 mm rigid endoscope was inserted into bilateral nasal cavities and the nasal valves, nasal cavity, middle meatus, sphenoethmoid recess, nasopharynx were evaluated for evidence of obstruction, edema, purulence, polyps and/or mass/lesion.       Etoile-Imtiaz Endoscopic Scoring System  Endoscopic observation Right Left   Polyps in middle meatus (0 = absent, 1 = restricted to middle meatus, 2 = Beyond middle meatus) 0 0   Discharge (0 = absent, 1 = thin and clear, 2 = thick, purulent) 1 1   Edema (0 = absent, 1 = mild-moderate, 2 = moderate-severe) 0 0   Crusting (0 = absent, 1 = mild-moderate, 2 = moderate-severe) 1 1   Scarring (0= absent, 1 = mild-moderate, 2 = moderate-severe) 0 0   Total 2 2     Findings  RT: Synechia of the right nasal cavity; no signs of infection  LT: Resolution of extensive Staph aureus biofilm formation in the surgical cavity no signs of residual tumor. Some staph biofilm at entrance of nasal cavity        The patient tolerated the procedure well without complication.     Laboratory Review:  n/a     Imaging Review:  CT sinus 12/15/2022:  1.  The left nasal cavity is opacified along its mid to posterior   aspect with extension of soft tissue into the left posterior   nasopharynx. There is questionable remodeling of the posterolateral   wall of the left nasal cavity. Is an underlying lesion at this   location is possible and correlation with direct visualization is   advised.        MRI skull base w and w/o contrast 1/2/2023  IMPRESSION: 3.2 x 1.4 x 3.1 cm left posterior nasal cavity mass  compatible with pathology proven adenocarcinoma..     PET/CT  1/3/2023:  IMPRESSION: In this patient with biopsy-proven left nasal cavity  adenocarcinoma:  1. No evidence of metastasis in the chest, abdomen or pelvis.  2. Please refer to dedicated report for findings in the head and neck  region.     Impression:   1. 2.8 x 2.7 x 1.4 cm hypermetabolic mass in the left posterior nasal  cavity compatible with pathology proven adenocarcinoma.  2. No cervical lymphadenopathy.   3. Please refer to the whole body PET CT performed as a separate  report, for the findings of the remainder of the body.        *I have personally reviewed these images and agree with the radiologist's interpretation*        Pathology Review:  Final Diagnosis 2/2/23  A. LEFT POSTERIOR ETHMOID, EXCISIONAL BIOPSY:  -Benign sinonasal mucosa with chronic inflammation and focal calcifications.  B. LEFT ANTERIOR ETHMOID, EXCISIONAL BIOPSY:  -Benign bone and sinonasal mucosa with chronic inflammation.  C. LEFT LATERAL NASAL WALL, BIOPSY:  -Benign sinonasal mucosa with chronic inflammation  D. RIGHT OLFACTORY CLEFT, BIOPSY:  -Benign sinonasal mucosa with chronic inflammation.  E. LEFT ANTERIOR SEPTAL MARGIN, EXCISION:  -Benign sinonasal mucosa with chronic inflammation.  F. LEFT INFERIOR SEPTAL MARGIN, EXCISION:  -Benign sinonasal mucosa with chronic inflammation  G. LEFT SPHENOID ROSTRUM, EXCISION:  -Benign sinonasal mucosa with chronic inflammation  H. LEFT OLFACTORY CLEFT, BIOPSY:  -Benign fibroconnective tissue and bone.  I. LEFT OLFACTORY CLEFT #2, BIOPSY:  -FOCI OF RESIDUAL INTESTINAL-TYPE ADENOCARCINOMA in fragments of fibroconnective and mucosal tissue with chronic  inflammation.  J. LEFT OLFACTORY #3, BIOPSY:  -Negative for malignancy.  K. POSTERIOR DURAL MARGIN, BIOPSY:  -Scant fragments of fibroconnective tissue, negative for malignancy.  L. ANTERIOR DURAL MARGIN, BIOPSY:  -Small fragment of fibroconnective and nerve tissue, negative for malignancy.  M. MEDIAL DURAL MARGIN, BIOPSY:  -Small fragment of  dense fibrous tissue, negative for malignancy.  N. LATERAL DURAL MARGIN, BIOPSY:  -Small fragment of dense fibrous tissue, negative for malignancy.  O. POSTERIOR NERVE OLFACTORY MARGIN, EXCISION:  -Small fragment of neural type tissue, negative for malignancy.        Final Diagnosis 1/17/23  A. NOSE, LEFT SINONASAL TUMOR, EXCISION:  - SINONASAL ADENOCARCINOMA, INTESTINAL TYPE  - See comment  B. NOSE, LEFT MIDDLE TURBINATE, EXCISION:  - Fragments of benign respiratory mucosa and lamellar bone  - No evidence of malignancy  C. NOSE, LEFT OLFACTORY CLEFT, EXCISION:  - SINONASAL ADENOCARCINOMA, INTESTINAL TYPE        Nasal mass biopsy - 12/23/2022  Final Diagnosis        NASAL MASS, BIOPSY:   1. Moderately-differentiated adenocarcinoma, favor     sinonasal adenocarcinoma, non-intestinal type         Assessment/Medical Decision Making:  Left olfactory cleft ITAC  S/p stage 1 surgery  S/p stage 2 surgery 2/2/23  Nasal obstruction secondary to above  Atypical facial pain  Anosmia and Ageusia      Plan:  Bilateral endoscopy is performed today.  No signs of infection.  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  We discussed treatment of this after her posttreatment imaging, but this is not mandatory.    We also discussed usage of nose cones to help with nasal breathing at night.  Continue Ponaris to help with nasal dryness. May also try muporicin for burden of staph biofilm  May also try to reduce frequency of irrigation to reduce nasal dryness    Follow up after surveillance imaging, then we can transition care to skull base colleague    Germain Vyas MD    [facility]  Rhinology, Endoscopic Skull Base Surgery  [facility]  Department of Otolaryngology - Head & Neck Surgery    The above documentation was completed with the aid of voice recognition dictation software, and as a result, unexpected dictation errors may occur.  Please don't hesitate to contact me for any clarification needed regarding this note.       Additional portions of the patient's history have been reviewed below.   ~~~~~~~~~~~~~~~~~~~~~~~~~~~~~~~~~~~~~~~~~~~~~~~~~~~~~~~~~~~~~~~~~~~~~~~~~~~~~~~~~~~~~~~~~~~~~~~~~~~~~~~~~~~~~~~~~~~~~~~~~~~~~~~~~~~~~~~    Past Medical History:   Diagnosis Date     History of hysterectomy 03/06/2015     Hypothyroidism      Motion sickness      PONV (postoperative nausea and vomiting)      Primary adenocarcinoma of nasal cavity (H) 12/23/2022     S/P radiation therapy     6,000 cGy to sinonasal completed on 5/3/2023 [facility]        Past Surgical History:   Procedure Laterality Date     CATARACT IOL, RT/LT Left     as a child     HYSTERECTOMY  2015     LAPAROTOMY EXPLORATORY  2015     OPTICAL TRACKING SYSTEM ENDOSCOPIC EXCISION SINUS TUMOR Left 1/17/2023    Procedure: EXCISION, NEOPLASM, PARANASAL SINUS, ENDOSCOPIC, USING OPTICAL TRACKING SYSTEM;  Surgeon: Germain Vyas MD;  Location: [facility] OR     OPTICAL TRACKING SYSTEM ENDOSCOPIC EXCISION SINUS TUMOR Bilateral 2/2/2023    Procedure: stealth assisted endoscopic endonasal anterior craniofacial resection, nasoseptal flap;  Surgeon: Germain Vyas MD;  Location: [facility] OR     OH BIOPSY NASAL LESION  12/23/2022     PROCURE GRAFT FAT Left 2/2/2023    Procedure: fascia elsy graft, left upper thigh;  Surgeon: Germain Vyas MD;  Location: [facility] OR     RETINAL DETACHMENT SURGERY Left 2019     TONSILLECTOMY      age 6        Family History   Problem Relation Age of Onset     Breast Cancer Mother      Breast Cancer Maternal Aunt      Breast Cancer Maternal Aunt      Lung Cancer Maternal Aunt      Bladder Cancer Maternal Aunt      Cancer Maternal Uncle         Eye        Social History     Socioeconomic History     Marital status:    Tobacco Use     Smoking status: Never     Smokeless tobacco: Never   Substance and Sexual Activity     Alcohol use: Yes     Comment:  social use per patient     Drug use: Never

## 2023-07-25 ENCOUNTER — MYC MEDICAL ADVICE (OUTPATIENT)
Dept: OTOLARYNGOLOGY | Facility: CLINIC | Age: 53
End: 2023-07-25
Payer: COMMERCIAL

## 2023-08-16 ENCOUNTER — ANCILLARY ORDERS (OUTPATIENT)
Dept: OTOLARYNGOLOGY | Facility: CLINIC | Age: 53
End: 2023-08-16

## 2023-08-16 ENCOUNTER — HOSPITAL ENCOUNTER (OUTPATIENT)
Dept: MRI IMAGING | Facility: CLINIC | Age: 53
Discharge: HOME | End: 2023-08-16
Attending: OTOLARYNGOLOGY
Payer: COMMERCIAL

## 2023-08-16 ENCOUNTER — HOSPITAL ENCOUNTER (OUTPATIENT)
Dept: PET IMAGING | Facility: CLINIC | Age: 53
Discharge: HOME | End: 2023-08-16
Attending: OTOLARYNGOLOGY
Payer: COMMERCIAL

## 2023-08-16 ENCOUNTER — OFFICE VISIT (OUTPATIENT)
Dept: OTOLARYNGOLOGY | Facility: CLINIC | Age: 53
End: 2023-08-16
Payer: COMMERCIAL

## 2023-08-16 VITALS
SYSTOLIC BLOOD PRESSURE: 124 MMHG | DIASTOLIC BLOOD PRESSURE: 83 MMHG | WEIGHT: 158 LBS | BODY MASS INDEX: 23.95 KG/M2 | HEIGHT: 68 IN | HEART RATE: 81 BPM

## 2023-08-16 DIAGNOSIS — C31.9 SINUS CANCER WITH INTRACRANIAL EXTENSION (H): ICD-10-CM

## 2023-08-16 DIAGNOSIS — J34.89 NASAL OBSTRUCTION: Primary | ICD-10-CM

## 2023-08-16 DIAGNOSIS — C80.1 ADENOCARCINOMA (H): Primary | ICD-10-CM

## 2023-08-16 DIAGNOSIS — C80.1 ADENOCARCINOMA (H): ICD-10-CM

## 2023-08-16 DIAGNOSIS — C79.31 SINUS CANCER WITH INTRACRANIAL EXTENSION (H): ICD-10-CM

## 2023-08-16 LAB
CREAT BLD-MCNC: 0.7 MG/DL (ref 0.5–1)
GFR SERPL CREATININE-BSD FRML MDRD: >60 ML/MIN/1.73M2

## 2023-08-16 PROCEDURE — 99213 OFFICE O/P EST LOW 20 MIN: CPT | Mod: 25 | Performed by: OTOLARYNGOLOGY

## 2023-08-16 PROCEDURE — 70553 MRI BRAIN STEM W/O & W/DYE: CPT

## 2023-08-16 PROCEDURE — 70553 MRI BRAIN STEM W/O & W/DYE: CPT | Mod: 26 | Performed by: STUDENT IN AN ORGANIZED HEALTH CARE EDUCATION/TRAINING PROGRAM

## 2023-08-16 PROCEDURE — 70460 CT HEAD/BRAIN W/DYE: CPT

## 2023-08-16 PROCEDURE — 78815 PET IMAGE W/CT SKULL-THIGH: CPT | Mod: 26 | Performed by: RADIOLOGY

## 2023-08-16 PROCEDURE — A9552 F18 FDG: HCPCS | Performed by: OTOLARYNGOLOGY

## 2023-08-16 PROCEDURE — 74177 CT ABD & PELVIS W/CONTRAST: CPT | Mod: 26 | Performed by: RADIOLOGY

## 2023-08-16 PROCEDURE — 70491 CT SOFT TISSUE NECK W/DYE: CPT

## 2023-08-16 PROCEDURE — 70491 CT SOFT TISSUE NECK W/DYE: CPT | Mod: 26 | Performed by: STUDENT IN AN ORGANIZED HEALTH CARE EDUCATION/TRAINING PROGRAM

## 2023-08-16 PROCEDURE — 255N000002 HC RX 255 OP 636: Mod: JZ | Performed by: OTOLARYNGOLOGY

## 2023-08-16 PROCEDURE — 31231 NASAL ENDOSCOPY DX: CPT | Performed by: OTOLARYNGOLOGY

## 2023-08-16 PROCEDURE — 343N000001 HC RX 343: Performed by: OTOLARYNGOLOGY

## 2023-08-16 PROCEDURE — 82565 ASSAY OF CREATININE: CPT

## 2023-08-16 PROCEDURE — A9585 GADOBUTROL INJECTION: HCPCS | Mod: JZ | Performed by: OTOLARYNGOLOGY

## 2023-08-16 PROCEDURE — 71260 CT THORAX DX C+: CPT | Mod: 26 | Performed by: RADIOLOGY

## 2023-08-16 PROCEDURE — 78815 PET IMAGE W/CT SKULL-THIGH: CPT | Mod: PS

## 2023-08-16 PROCEDURE — 250N000011 HC RX IP 250 OP 636: Performed by: OTOLARYNGOLOGY

## 2023-08-16 PROCEDURE — 70460 CT HEAD/BRAIN W/DYE: CPT | Mod: 26 | Performed by: STUDENT IN AN ORGANIZED HEALTH CARE EDUCATION/TRAINING PROGRAM

## 2023-08-16 RX ORDER — GADOBUTROL 604.72 MG/ML
7.5 INJECTION INTRAVENOUS ONCE
Status: COMPLETED | OUTPATIENT
Start: 2023-08-16 | End: 2023-08-16

## 2023-08-16 RX ORDER — IOPAMIDOL 755 MG/ML
0-135 INJECTION, SOLUTION INTRAVASCULAR ONCE
Status: COMPLETED | OUTPATIENT
Start: 2023-08-16 | End: 2023-08-16

## 2023-08-16 RX ADMIN — FLUDEOXYGLUCOSE F-18 10.07 MILLICURIE: 500 INJECTION, SOLUTION INTRAVENOUS at 08:46

## 2023-08-16 RX ADMIN — GADOBUTROL 7.5 ML: 604.72 INJECTION INTRAVENOUS at 13:36

## 2023-08-16 RX ADMIN — IOPAMIDOL 94 ML: 755 INJECTION, SOLUTION INTRAVENOUS at 10:04

## 2023-08-16 ASSESSMENT — PAIN SCALES - GENERAL: PAINLEVEL: NO PAIN (0)

## 2023-08-16 NOTE — PROGRESS NOTES
"  [facility]  Return Visit  Encounter date:   August 16th , 2023    Chief Complaint:   Follow-up     ID: s/p second stage surgery as below, left sinonasal adenocarcinoma    Interval History:   Rosalind Murphy is a 53 year old female with history of pT3cN0 left sinonasal adenocarcinoma who presents for follow up. She started radiation with Dr. Pereira and completed 30/30 fractrions on 5/3/23.     Returns for follow-up  No infectious symptoms but nasal congestion persists  Slowly getting energy level back  Still not finding food enjoyable      Sino-Nasal Outcome Test (SNOT - 22)  DNT    [place] [facility]  Procedure Date: 02/02/2023     PREOPERATIVE DIAGNOSES:    1.  Intestinal type sinonasal adenocarcinoma.  2.  Nasal obstruction.     PREOPERATIVE DIAGNOSES:    1.  Intestinal type sinonasal adenocarcinoma.  2.  Nasal obstruction.     PROCEDURE PERFORMED:     1.  Endoscopic endonasal transcribriform resection of anterior cranial fossa intradural tumor, left sinonasal adenocarcinoma.  2.  [place] of right-sided pedicled nasoseptal flap pedicled off the posterior septal branch of sphenopalatine artery.     SURGEON:  Germain Vyas MD     CO-SURGEON:  Abhi Tidwell MD    Review of systems: A 14-point review of systems has been conducted and is negative for any notable symptoms, except as dictated in the history of present illness.     Physical Exam:  Vital signs: /83 (BP Location: Left arm, Patient Position: Sitting, Cuff Size: Adult Regular)   Pulse 81   Ht 1.727 m (5' 8\")   Wt 71.7 kg (158 lb)   BMI 24.02 kg/m     General Appearance: No acute distress, appropriate demeanor, conversant  Eyes: moist conjunctivae; EOMI; pupils symmetric; visual acuity grossly intact; no proptosis  Head: normocephalic; overall symmetric appearance without deformity  Face: overall symmetric without deformity; HB I/VI  Nose: No external deformity; see endoscopy  Lungs: symmetric chest rise; no " wheezing  CV: Good distal perfusion; normal hear rate  Extremities: No deformity  Neurologic Exam: Cranial nerves II-XII are grossly intact; no focal deficit     Procedure Note  Procedure performed: Rigid nasal endoscopy   Indication: To evaluate for sinonasal pathology not visualized on routine anterior rhinoscopy  Anesthesia: 4% topical lidocaine with 0.05 % oxymetazoline  Description of procedure: A 30 degree, 3 mm rigid endoscope was inserted into bilateral nasal cavities and the nasal valves, nasal cavity, middle meatus, sphenoethmoid recess, nasopharynx were evaluated for evidence of obstruction, edema, purulence, polyps and/or mass/lesion.       [place]-Imtiaz Endoscopic Scoring System  Endoscopic observation Right Left   Polyps in middle meatus (0 = absent, 1 = restricted to middle meatus, 2 = Beyond middle meatus) 0 0   Discharge (0 = absent, 1 = thin and clear, 2 = thick, purulent) 1 1   Edema (0 = absent, 1 = mild-moderate, 2 = moderate-severe) 0 0   Crusting (0 = absent, 1 = mild-moderate, 2 = moderate-severe) 1 1   Scarring (0= absent, 1 = mild-moderate, 2 = moderate-severe) 0 0   Total 2 2     Findings  RT: Synechia of the right nasal cavity; no signs of infection  LT: Small amount of biofilm the left sinonasal cavity; no evidence of disease recurrence      The patient tolerated the procedure well without complication.     Laboratory Review:  n/a     Imaging Review:  Whole-body PET/CT August 16, 2023:  IMPRESSION:      1. New hypermetabolic, 1.3 cm groundglass pulmonary opacity in the  left lower lobe is indeterminate, however favored  infective/inflammatory process. Recommend attention on short interval  follow-up with CT.     2. Otherwise, no suspicious sites in the chest, abdomen or pelvis to  indicate definite metastasis.     3. Refer to separately dictated neuroradiology PET-CT for findings in  the head and neck.     CONY DAVILA MD         *I have personally reviewed these images and agree with  the radiologist's interpretation*       I also reviewed the PET/CT of the CT neck and also MRI of the skull base.  I see no evidence of disease recurrence.  Final read pending.     Pathology Review:  Final Diagnosis 2/2/23  A. LEFT POSTERIOR ETHMOID, EXCISIONAL BIOPSY:  -Benign sinonasal mucosa with chronic inflammation and focal calcifications.  B. LEFT ANTERIOR ETHMOID, EXCISIONAL BIOPSY:  -Benign bone and sinonasal mucosa with chronic inflammation.  C. LEFT LATERAL NASAL WALL, BIOPSY:  -Benign sinonasal mucosa with chronic inflammation  D. RIGHT OLFACTORY CLEFT, BIOPSY:  -Benign sinonasal mucosa with chronic inflammation.  E. LEFT ANTERIOR SEPTAL MARGIN, EXCISION:  -Benign sinonasal mucosa with chronic inflammation.  F. LEFT INFERIOR SEPTAL MARGIN, EXCISION:  -Benign sinonasal mucosa with chronic inflammation  G. LEFT SPHENOID ROSTRUM, EXCISION:  -Benign sinonasal mucosa with chronic inflammation  H. LEFT OLFACTORY CLEFT, BIOPSY:  -Benign fibroconnective tissue and bone.  I. LEFT OLFACTORY CLEFT #2, BIOPSY:  -FOCI OF RESIDUAL INTESTINAL-TYPE ADENOCARCINOMA in fragments of fibroconnective and mucosal tissue with chronic  inflammation.  J. LEFT OLFACTORY #3, BIOPSY:  -Negative for malignancy.  K. POSTERIOR DURAL MARGIN, BIOPSY:  -Scant fragments of fibroconnective tissue, negative for malignancy.  L. ANTERIOR DURAL MARGIN, BIOPSY:  -Small fragment of fibroconnective and nerve tissue, negative for malignancy.  M. MEDIAL DURAL MARGIN, BIOPSY:  -Small fragment of dense fibrous tissue, negative for malignancy.  N. LATERAL DURAL MARGIN, BIOPSY:  -Small fragment of dense fibrous tissue, negative for malignancy.  O. POSTERIOR NERVE OLFACTORY MARGIN, EXCISION:  -Small fragment of neural type tissue, negative for malignancy.        Final Diagnosis 1/17/23  A. NOSE, LEFT SINONASAL TUMOR, EXCISION:  - SINONASAL ADENOCARCINOMA, INTESTINAL TYPE  - See comment  B. NOSE, LEFT MIDDLE TURBINATE, EXCISION:  - Fragments of benign  respiratory mucosa and lamellar bone  - No evidence of malignancy  C. NOSE, LEFT OLFACTORY CLEFT, EXCISION:  - SINONASAL ADENOCARCINOMA, INTESTINAL TYPE        Nasal mass biopsy - 12/23/2022  Final Diagnosis        NASAL MASS, BIOPSY:   1. Moderately-differentiated adenocarcinoma, favor     sinonasal adenocarcinoma, non-intestinal type         Assessment/Medical Decision Making:  Left olfactory cleft ITAC  S/p stage 1 surgery  S/p stage 2 surgery 2/2/23  Nasal obstruction secondary to above  Atypical facial pain  Anosmia and Ageusia      Plan:  Bilateral endoscopy is performed today and I also reviewed first round of surveillance imaging.  I see no signs of disease recurrence.  Final read for MRI and head and neck PET/CT are pending.  I will relay the results of these once the radiology has interpreted them.      She is slowly getting her energy.  Food is still somewhat enjoyable.  We will continue to monitor this for now.  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  She will discuss with one of my skull base colleagues if it is reasonable to have this lysed under general anesthesia follow-up.    She will follow-up with her my skull base colleagues in 3 months with updated chest CT and also MRI of the sinuses.    Germain Vyas MD    [facility]  Rhinology, Endoscopic Skull Base Surgery  [facility]  Department of Otolaryngology - Head & Neck Surgery    The above documentation was completed with the aid of voice recognition dictation software, and as a result, unexpected dictation errors may occur. Please don't hesitate to contact me for any clarification needed regarding this note.       Additional portions of the patient's history have been reviewed below.   ~~~~~~~~~~~~~~~~~~~~~~~~~~~~~~~~~~~~~~~~~~~~~~~~~~~~~~~~~~~~~~~~~~~~~~~~~~~~~~~~~~~~~~~~~~~~~~~~~~~~~~~~~~~~~~~~~~~~~~~~~~~~~~~~~~~~~~~    Past Medical History:   Diagnosis Date    History of  hysterectomy 03/06/2015    Hypothyroidism     Motion sickness     PONV (postoperative nausea and vomiting)     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        Past Surgical History:   Procedure Laterality Date    CATARACT IOL, RT/LT Left     as a child    HYSTERECTOMY  2015    LAPAROTOMY EXPLORATORY  2015    OPTICAL TRACKING SYSTEM ENDOSCOPIC EXCISION SINUS TUMOR Left 1/17/2023    Procedure: EXCISION, NEOPLASM, PARANASAL SINUS, ENDOSCOPIC, USING OPTICAL TRACKING SYSTEM;  Surgeon: Germain Vyas MD;  Location: [facility] OR    OPTICAL TRACKING SYSTEM ENDOSCOPIC EXCISION SINUS TUMOR Bilateral 2/2/2023    Procedure: stealth assisted endoscopic endonasal anterior craniofacial resection, nasoseptal flap;  Surgeon: Germain Vyas MD;  Location: [facility] OR    [place] BIOPSY NASAL LESION  12/23/2022    PROCURE GRAFT FAT Left 2/2/2023    Procedure: fascia elsy graft, left upper thigh;  Surgeon: Germain Vyas MD;  Location: [facility] OR    RETINAL DETACHMENT SURGERY Left 2019    TONSILLECTOMY      age 6        Family History   Problem Relation Age of Onset    Breast Cancer Mother     Breast Cancer Maternal Aunt     Breast Cancer Maternal Aunt     Lung Cancer Maternal Aunt     Bladder Cancer Maternal Aunt     Cancer Maternal Uncle         Eye        Social History     Socioeconomic History    Marital status:      Spouse name: None    Number of children: None    Years of education: None    Highest education level: None   Tobacco Use    Smoking status: Never    Smokeless tobacco: Never   Substance and Sexual Activity    Alcohol use: Yes     Comment: social use per patient    Drug use: Never

## 2023-08-16 NOTE — PATIENT INSTRUCTIONS
You were seen in the ENT Clinic today by Dr. Vyas. If you have any questions or concerns after your appointment, please contact us (see below)       2.   The following recommendations have been made based upon your appointment today:   -Obtain MRI and CT scan in 3 months.      3.   Please return to the ENT clinic in 3 months with Dr. Lorenzo Michelle after imaging.           How to Contact Us:  Send a [employer] message to your provider. Our team will respond to you via [employer]. Occasionally, we will need to call you to get further information.  For urgent matters (Monday-Friday), call the ENT Clinic: [phone] and speak with a call center team member - they will route your call appropriately.   If you'd like to speak directly with a nurse, please find our contact information below. We do our best to check voicemail frequently throughout the day, and will work to call you back within 1-2 days. For urgent matters, please use the general clinic phone numbers listed above.        Syeda VALADEZ RN  ENT RN Care Coordinator  Direct: [phone]  Karla PIMENTEL LPN  Direct: [phone]         [facility]  Department of Otolaryngology

## 2023-08-16 NOTE — LETTER
"8/16/2023       RE: Rosalind Murphy  [address] [address]     Dear Colleague,    Thank you for referring your patient, Rosalind Murphy, to the [facility] [place] at [facility]. Please see a copy of my visit note below.      [facility]  Return Visit  Encounter date:   August 16th , 2023    Chief Complaint:   Follow-up     ID: s/p second stage surgery as below, left sinonasal adenocarcinoma    Interval History:   Rosalind Murphy is a 53 year old female with history of pT3cN0 left sinonasal adenocarcinoma who presents for follow up. She started radiation with Dr. Pereira and completed 30/30 fractrions on 5/3/23.     Returns for follow-up  No infectious symptoms but nasal congestion persists  Slowly getting energy level back  Still not finding food enjoyable      Sino-Nasal Outcome Test (SNOT - 22)  [facility] Operative History  Procedure Date: 02/02/2023     PREOPERATIVE DIAGNOSES:    1.  Intestinal type sinonasal adenocarcinoma.  2.  Nasal obstruction.     PREOPERATIVE DIAGNOSES:    1.  Intestinal type sinonasal adenocarcinoma.  2.  Nasal obstruction.     PROCEDURE PERFORMED:     1.  Endoscopic endonasal transcribriform resection of anterior cranial fossa intradural tumor, left sinonasal adenocarcinoma.  2.  [place] of right-sided pedicled nasoseptal flap pedicled off the posterior septal branch of sphenopalatine artery.     SURGEON:  Germain Vyas MD     CO-SURGEON:  Abhi iTdwell MD    Review of systems: A 14-point review of systems has been conducted and is negative for any notable symptoms, except as dictated in the history of present illness.     Physical Exam:  Vital signs: /83 (BP Location: Left arm, Patient Position: Sitting, Cuff Size: Adult Regular)   Pulse 81   Ht 1.727 m (5' 8\")   Wt 71.7 kg (158 lb)   BMI 24.02 kg/m     General Appearance: No acute distress, appropriate " demeanor, conversant  Eyes: moist conjunctivae; EOMI; pupils symmetric; visual acuity grossly intact; no proptosis  Head: normocephalic; overall symmetric appearance without deformity  Face: overall symmetric without deformity; HB I/VI  Nose: No external deformity; see endoscopy  Lungs: symmetric chest rise; no wheezing  CV: Good distal perfusion; normal hear rate  Extremities: No deformity  Neurologic Exam: Cranial nerves II-XII are grossly intact; no focal deficit     Procedure Note  Procedure performed: Rigid nasal endoscopy   Indication: To evaluate for sinonasal pathology not visualized on routine anterior rhinoscopy  Anesthesia: 4% topical lidocaine with 0.05 % oxymetazoline  Description of procedure: A 30 degree, 3 mm rigid endoscope was inserted into bilateral nasal cavities and the nasal valves, nasal cavity, middle meatus, sphenoethmoid recess, nasopharynx were evaluated for evidence of obstruction, edema, purulence, polyps and/or mass/lesion.       [place]-Imtiaz Endoscopic Scoring System  Endoscopic observation Right Left   Polyps in middle meatus (0 = absent, 1 = restricted to middle meatus, 2 = Beyond middle meatus) 0 0   Discharge (0 = absent, 1 = thin and clear, 2 = thick, purulent) 1 1   Edema (0 = absent, 1 = mild-moderate, 2 = moderate-severe) 0 0   Crusting (0 = absent, 1 = mild-moderate, 2 = moderate-severe) 1 1   Scarring (0= absent, 1 = mild-moderate, 2 = moderate-severe) 0 0   Total 2 2     Findings  RT: Synechia of the right nasal cavity; no signs of infection  LT: Small amount of biofilm the left sinonasal cavity; no evidence of disease recurrence      The patient tolerated the procedure well without complication.     Laboratory Review:  n/a     Imaging Review:  Whole-body PET/CT August 16, 2023:  IMPRESSION:      1. New hypermetabolic, 1.3 cm groundglass pulmonary opacity in the  left lower lobe is indeterminate, however favored  infective/inflammatory process. Recommend attention on short  interval  follow-up with CT.     2. Otherwise, no suspicious sites in the chest, abdomen or pelvis to  indicate definite metastasis.     3. Refer to separately dictated neuroradiology PET-CT for findings in  the head and neck.     CONY DAVILA MD         *I have personally reviewed these images and agree with the radiologist's interpretation*       I also reviewed the PET/CT of the CT neck and also MRI of the skull base.  I see no evidence of disease recurrence.  Final read pending.     Pathology Review:  Final Diagnosis 2/2/23  A. LEFT POSTERIOR ETHMOID, EXCISIONAL BIOPSY:  -Benign sinonasal mucosa with chronic inflammation and focal calcifications.  B. LEFT ANTERIOR ETHMOID, EXCISIONAL BIOPSY:  -Benign bone and sinonasal mucosa with chronic inflammation.  C. LEFT LATERAL NASAL WALL, BIOPSY:  -Benign sinonasal mucosa with chronic inflammation  D. RIGHT OLFACTORY CLEFT, BIOPSY:  -Benign sinonasal mucosa with chronic inflammation.  E. LEFT ANTERIOR SEPTAL MARGIN, EXCISION:  -Benign sinonasal mucosa with chronic inflammation.  F. LEFT INFERIOR SEPTAL MARGIN, EXCISION:  -Benign sinonasal mucosa with chronic inflammation  G. LEFT SPHENOID ROSTRUM, EXCISION:  -Benign sinonasal mucosa with chronic inflammation  H. LEFT OLFACTORY CLEFT, BIOPSY:  -Benign fibroconnective tissue and bone.  I. LEFT OLFACTORY CLEFT #2, BIOPSY:  -FOCI OF RESIDUAL INTESTINAL-TYPE ADENOCARCINOMA in fragments of fibroconnective and mucosal tissue with chronic  inflammation.  J. LEFT OLFACTORY #3, BIOPSY:  -Negative for malignancy.  K. POSTERIOR DURAL MARGIN, BIOPSY:  -Scant fragments of fibroconnective tissue, negative for malignancy.  L. ANTERIOR DURAL MARGIN, BIOPSY:  -Small fragment of fibroconnective and nerve tissue, negative for malignancy.  M. MEDIAL DURAL MARGIN, BIOPSY:  -Small fragment of dense fibrous tissue, negative for malignancy.  N. LATERAL DURAL MARGIN, BIOPSY:  -Small fragment of dense fibrous tissue, negative for  malignancy.  O. POSTERIOR NERVE OLFACTORY MARGIN, EXCISION:  -Small fragment of neural type tissue, negative for malignancy.        Final Diagnosis 1/17/23  A. NOSE, LEFT SINONASAL TUMOR, EXCISION:  - SINONASAL ADENOCARCINOMA, INTESTINAL TYPE  - See comment  B. NOSE, LEFT MIDDLE TURBINATE, EXCISION:  - Fragments of benign respiratory mucosa and lamellar bone  - No evidence of malignancy  C. NOSE, LEFT OLFACTORY CLEFT, EXCISION:  - SINONASAL ADENOCARCINOMA, INTESTINAL TYPE        Nasal mass biopsy - 12/23/2022  Final Diagnosis        NASAL MASS, BIOPSY:   1. Moderately-differentiated adenocarcinoma, favor     sinonasal adenocarcinoma, non-intestinal type         Assessment/Medical Decision Making:  Left olfactory cleft ITAC  S/p stage 1 surgery  S/p stage 2 surgery 2/2/23  Nasal obstruction secondary to above  Atypical facial pain  Anosmia and Ageusia      Plan:  Bilateral endoscopy is performed today and I also reviewed first round of surveillance imaging.  I see no signs of disease recurrence.  Final read for MRI and head and neck PET/CT are pending.  I will relay the results of these once the radiology has interpreted them.      She is slowly getting her energy.  Food is still somewhat enjoyable.  We will continue to monitor this for now.  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  She will discuss with one of my skull base colleagues if it is reasonable to have this lysed under general anesthesia follow-up.    She will follow-up with her my skull base colleagues in 3 months with updated chest CT and also MRI of the sinuses.    Germain Vyas MD    [facility]  Rhinology, Endoscopic Skull Base Surgery  [facility]  Department of Otolaryngology - Head & Neck Surgery    The above documentation was completed with the aid of voice recognition dictation software, and as a result, unexpected dictation errors may occur. Please don't hesitate to contact me  for any clarification needed regarding this note.       Additional portions of the patient's history have been reviewed below.   ~~~~~~~~~~~~~~~~~~~~~~~~~~~~~~~~~~~~~~~~~~~~~~~~~~~~~~~~~~~~~~~~~~~~~~~~~~~~~~~~~~~~~~~~~~~~~~~~~~~~~~~~~~~~~~~~~~~~~~~~~~~~~~~~~~~~~~~    Past Medical History:   Diagnosis Date    History of hysterectomy 03/06/2015    Hypothyroidism     Motion sickness     PONV (postoperative nausea and vomiting)     Primary adenocarcinoma of nasal cavity (H) 12/23/2022    S/P radiation therapy     6,000 cGy to sinonasal completed on 5/3/2023 [facility]        Past Surgical History:   Procedure Laterality Date    CATARACT IOL, RT/LT Left     as a child    HYSTERECTOMY  2015    LAPAROTOMY EXPLORATORY  2015    OPTICAL TRACKING SYSTEM ENDOSCOPIC EXCISION SINUS TUMOR Left 1/17/2023    Procedure: EXCISION, NEOPLASM, PARANASAL SINUS, ENDOSCOPIC, USING OPTICAL TRACKING SYSTEM;  Surgeon: Germain Vyas MD;  Location: [facility] OR    OPTICAL TRACKING SYSTEM ENDOSCOPIC EXCISION SINUS TUMOR Bilateral 2/2/2023    Procedure: stealth assisted endoscopic endonasal anterior craniofacial resection, nasoseptal flap;  Surgeon: Germain Vyas MD;  Location: [facility] OR    [place] BIOPSY NASAL LESION  12/23/2022    PROCURE GRAFT FAT Left 2/2/2023    Procedure: fascia elsy graft, left upper thigh;  Surgeon: Germain Vyas MD;  Location: [facility] OR    RETINAL DETACHMENT SURGERY Left 2019    TONSILLECTOMY      age 6        Family History   Problem Relation Age of Onset    Breast Cancer Mother     Breast Cancer Maternal Aunt     Breast Cancer Maternal Aunt     Lung Cancer Maternal Aunt     Bladder Cancer Maternal Aunt     Cancer Maternal Uncle         Eye        Social History     Socioeconomic History    Marital status:      Spouse name: None    Number of children: None    Years of education: None    Highest education level: None   Tobacco Use    Smoking status: Never    Smokeless tobacco: Never   Substance and  Sexual Activity    Alcohol use: Yes     Comment: social use per patient    Drug use: Never           Again, thank you for allowing me to participate in the care of your patient.      Sincerely,    Germain Vyas MD

## 2023-08-18 ENCOUNTER — OFFICE VISIT (OUTPATIENT)
Dept: RADIATION ONCOLOGY | Facility: CLINIC | Age: 53
End: 2023-08-18
Payer: COMMERCIAL

## 2023-08-18 ENCOUNTER — TELEPHONE (OUTPATIENT)
Dept: OTOLARYNGOLOGY | Facility: CLINIC | Age: 53
End: 2023-08-18

## 2023-08-18 VITALS
TEMPERATURE: 98.6 F | WEIGHT: 158.9 LBS | SYSTOLIC BLOOD PRESSURE: 112 MMHG | BODY MASS INDEX: 24.16 KG/M2 | OXYGEN SATURATION: 96 % | RESPIRATION RATE: 18 BRPM | HEART RATE: 76 BPM | DIASTOLIC BLOOD PRESSURE: 71 MMHG

## 2023-08-18 DIAGNOSIS — C30.0 PRIMARY ADENOCARCINOMA OF NASAL CAVITY (H): Primary | ICD-10-CM

## 2023-08-18 PROCEDURE — 99213 OFFICE O/P EST LOW 20 MIN: CPT | Performed by: SURGERY

## 2023-08-18 ASSESSMENT — PAIN SCALES - GENERAL: PAINLEVEL: NO PAIN (0)

## 2023-08-18 NOTE — PATIENT INSTRUCTIONS
Please contact [facility] RN with questions or concerns following today's appointment: [phone].       Please feel free to leave a detailed message if your call is not answered.    If your call is not received before 3:00 PM, it may not be returned until the following business day.    If you are receiving radiation treatment and need assistance after 3:00 PM or on the weekends, please call [phone] and ask to speak to the radiation oncologist on-call.    Thank you!    Darline BRANCH

## 2023-08-18 NOTE — NURSING NOTE
RADIATION ONCOLOGY HEAD & NECK FOLLOW-UP VISIT    Patient Name: Rosalind Murphy      : 1970     Age: 53 year old        ______________________________________________________________________________       Chief Complaint   Patient presents with    Cancer     Radiation oncology return visit with Dr. Pereira     /71 (BP Location: Left arm, Patient Position: Chair, Cuff Size: Adult Regular)   Pulse 76   Temp 98.6  F (37  C) (Oral)   Resp 18   Wt 72.1 kg (158 lb 14.4 oz)   SpO2 96%   BMI 24.16 kg/m       Radiation History:  Site: sinonasal  Total Dose: 6,000 cGy  Date Completed: 5/3/2023  Clinic: [facility]  Physician: Dr. Jules Pereira    Pain:  Denies at current visit, patient reports increasing severity of intermittent frontal headaches, reports taking Ibuprofen po as needed    Labs:  Other Labs: No    Imaging:  PET: 2023  MRI: 2023    Fatigue:   Grade 0: No toxicity    Skin:  No Concerns      Dental:   Most Recent Dental Visit: prior to start of radiation treatment  Patient reports she is due for routine dental exam this month and will contact [address] [place] [address] Dental in [place] to schedule      Nutrition:  Oral Intake: patient preference  Weight:   Wt Readings from Last 3 Encounters:   23 72.1 kg (158 lb 14.4 oz)   23 71.7 kg (158 lb)   23 76.7 kg (169 lb)           Future Appointments:     Appointment Date:     Appointment Date:     Appointment Date:        Residual radiation side effect: patient reports on-going dry mouth and dry nose, intermittent left ear pain, intermittent headaches per above and intermittent taste changes.     Additional Instructions: patient reports to follow-up with Dr. Lou in three months with imaging as recommended by Dr. Vyas.   Radiation follow-up per Dr. Pereira and scheduling will contact patient.    Nurse face-to-face time: Level 5:  over 15 min face to face time.    Darline Villa, RN BSN OCN CBCN

## 2023-08-18 NOTE — PROGRESS NOTES
Department of Radiation Oncology  [facility]: Cancer Center  [facility]  [address] [place] [id number] [phone]       Radiation Oncology Follow-up Visit  Aug 18, 2023      Rosalind Murphy  MRN: [id number]   : 1970     DIAGNOSIS / ID: Ms. 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 Recommendation is for 60Gy/30fx without chemotherapy. We did discussed the role of elective david irradiation, particularly given location of tumor and stage     INTENT OF RADIOTHERAPY: Adjuvant     CONCURRENT SYSTEMIC THERAPY: No              SITE OF TREATMENT: Sinonasal, elective lymph nodes     DATES  OF TREATMENT: 3/23/2023 to 5/3/2023    TOTAL DOSE OF TREATMENT / FRACTIONS: 60 Gy/30 fractions    INTERVAL SINCE COMPLETION OF RADIATION THERAPY: 3 months    SUBJECTIVE: Patient is completed radiation approximately 3 months ago.  She states overall that she is doing slightly better since completing radiation therapy.  Her taste is recently started to return and she is able to smell foul smells.  She still has xerostomia, but slightly improved with medication.  She described a period of time where she did not have any headaches but these are intermittent and started to appear but respond to conservative medications including ibuprofen.  She continues to work.  The posterior hair loss has slightly improved and she has not noted some regrowth.  She denies any focal neurologic deficits.    She has recently had a PET CT scan and MRI on 2023.  MRI did not demonstrate any evidence of local regional disease or any sign of recurrence.  PET CT scan also noticed posttreatment  changes without any obvious sign of a recurrence.  There is evidence of subcentimeter lymph nodes which have been relatively stable and with low SUV uptake, favored to be reactive. There was a new 1.3 groundglass opacity in the left lower lobe, favored to be inflammatory.  Otherwise there is no evidence of any distant metastatic disease.    She has recently seen Dr. Torres on 8/16/2023 who performed an anoscopy with no evidence of any recurrence.  Dr. Vyas is relocating to a new practice, and she will be establishing care with Dr. Lou in 11/2023.    PHYSICAL EXAM:  /63 (BP Location: Left arm)   Pulse 71   Temp 98  F (36.7  C) (Oral)   Resp 18   Wt 76.9 kg (169 lb 8 oz)   BMI 25.77 kg/m    Gen: Alert, in NAD  Eyes: PERRL, EOMI, sclera anicteric  HENT     Head: NC/AT     Ears: No external auricular lesions     Nose/sinus: No rhinorrhea or epistaxis     Oral Cavity/Oropharynx: MMM  Neck: Supple, full ROM, no LAD  Pulm: No wheezing, stridor or respiratory distress  CV: Well-perfused, no cyanosis, no pedal edema  Abdominal: Soft, nontender, nondistended, no hepatomegaly  Back: No step-offs or pain to palpation along the thoracolumbar spine, no CVA tenderness  Rectal/: Deferred  Musculoskeletal: Normal bulk and tone   Skin: Normal color and turgor  Neurologic: A/Ox3, CN II-XII intact  Psychiatric: Appropriate mood and affect    LABS AND IMAGING:  Reviewed.    IMPRESSION: Overall, patient is doing well from a radiation acute toxicity perspective.Recent Imaging does not demonstrate any local, regional, or distance recurrence.     PLAN:   1. Follow up with ENT (establshing care with new provider Dr. Tabares), given Dr. Vyas is re-locating. Imaging has already been ordered by the ENT team for 11/2023.      2. Follow up with radiation oncology in 6 months      Jules Pereira M.D.  Department of Radiation Oncology  [facility]

## 2023-08-18 NOTE — LETTER
2023         RE: Rosalind Murphy  [address] [facility] [address]        Dear Colleague,    Thank you for referring your patient, Rosalind Murphy, to the [facility] RADIATION ONCOLOGY [place]. Please see a copy of my visit note below.         Department of Radiation Oncology  [facility]: Cancer Center  [facility]  [address] [address] [place] [address] [phone]       Radiation Oncology Follow-up Visit  Aug 18, 2023      Rosalind Murphy  MRN: [id number]   : 1970     DIAGNOSIS / ID: Ms. 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 Recommendation is for 60Gy/30fx without chemotherapy. We did discussed the role of elective david irradiation, particularly given location of tumor and stage     INTENT OF RADIOTHERAPY: Adjuvant     CONCURRENT SYSTEMIC THERAPY: No              SITE OF TREATMENT: Sinonasal, elective lymph nodes     DATES  OF TREATMENT: 3/23/2023 to 5/3/2023    TOTAL DOSE OF TREATMENT / FRACTIONS: 60 Gy/30 fractions    INTERVAL SINCE COMPLETION OF RADIATION THERAPY: 3 months    SUBJECTIVE: Patient is completed radiation approximately 3 months ago.  She states overall that she is doing slightly better since completing radiation therapy.  Her taste is recently started to return and she is able to smell foul smells.  She still has xerostomia, but slightly improved with medication.  She described a period of time where she did not have any headaches but these are intermittent and started to appear but respond to conservative medications including ibuprofen.  She continues to work.  The posterior hair loss has slightly improved and she has  not noted some regrowth.  She denies any focal neurologic deficits.    She has recently had a PET CT scan and MRI on 8/16/2023.  MRI did not demonstrate any evidence of local regional disease or any sign of recurrence.  PET CT scan also noticed posttreatment changes without any obvious sign of a recurrence.  There is evidence of subcentimeter lymph nodes which have been relatively stable and with low SUV uptake, favored to be reactive. There was a new 1.3 groundglass opacity in the left lower lobe, favored to be inflammatory.  Otherwise there is no evidence of any distant metastatic disease.    She has recently seen Dr. Torres on 8/16/2023 who performed an anoscopy with no evidence of any recurrence.  Dr. Vyas is relocating to a new practice, and she will be establishing care with Dr. Lou in 11/2023.    PHYSICAL EXAM:  /63 (BP Location: Left arm)   Pulse 71   Temp 98  F (36.7  C) (Oral)   Resp 18   Wt 76.9 kg (169 lb 8 oz)   BMI 25.77 kg/m    Gen: Alert, in NAD  Eyes: PERRL, EOMI, sclera anicteric  HENT     Head: NC/AT     Ears: No external auricular lesions     Nose/sinus: No rhinorrhea or epistaxis     Oral Cavity/Oropharynx: MMM  Neck: Supple, full ROM, no LAD  Pulm: No wheezing, stridor or respiratory distress  CV: Well-perfused, no cyanosis, no pedal edema  Abdominal: Soft, nontender, nondistended, no hepatomegaly  Back: No step-offs or pain to palpation along the thoracolumbar spine, no CVA tenderness  Rectal/: Deferred  Musculoskeletal: Normal bulk and tone   Skin: Normal color and turgor  Neurologic: A/Ox3, CN II-XII intact  Psychiatric: Appropriate mood and affect    LABS AND IMAGING:  Reviewed.    IMPRESSION: Overall, patient is doing well from a radiation acute toxicity perspective.Recent Imaging does not demonstrate any local, regional, or distance recurrence.     PLAN:   1. Follow up with ENT (establshing care with new provider Dr. Tabares), given Dr. Vyas is re-locating. Imaging has  already been ordered by the ENT team for 11/2023.      2. Follow up with radiation oncology in 6 months      Jules Pereira M.D.  Department of Radiation Oncology  [facility]           Again, thank you for allowing me to participate in the care of your patient.        Sincerely,        Jules Pereira MD

## 2023-09-18 ENCOUNTER — TELEPHONE (OUTPATIENT)
Dept: OTOLARYNGOLOGY | Facility: CLINIC | Age: 53
End: 2023-09-18
Payer: COMMERCIAL

## 2023-09-18 NOTE — TELEPHONE ENCOUNTER
Patient called stating she is still having green and yellow drainage and not feeling well. She is unsure if this is a side effect of the radiation she has had. I informed patient that some of her symptoms could be caused by radiation but I don't think all of them and also radiation can hinder the bodies ability to heal.     I was able to get the patient scheduled with Giselle or [place] next wednesday for a culture. Patient agreeable to the plan.    Syeda Jenkins RN on 9/18/2023 at 3:47 PM

## 2023-09-18 NOTE — TELEPHONE ENCOUNTER
FUTURE VISIT INFORMATION      FUTURE VISIT INFORMATION:  Date: 9/27/23  Time: 10:20PM  Location: [facility]  REFERRAL INFORMATION:  Referring provider:    Referring providers clinic:    Reason for visit/diagnosis  Sinus infection, needs culture     RECORDS REQUESTED FROM:       Clinic name Comments Records Status Imaging Status   [facility] ENT 8/16/23- OV DR. GALARZA [facility]     [facility] ONCOLOGY  8/18/23- OV Jules Pereira MD  EPIC     IMAGING 11/2/23- CT CHEST, MR SINONASAL- SCHEDULE   8/16/23- MR SKULL, CT HEAD, CT NECK, PET    MORE IN PACS [facility] PACS   [facility] 12/15/22- CT SINUS   6/6/18- MR HEAD    OFFICE VISIT:  12/23/22- OV Herbert Day[facility]Rogel[facility] Stuart [facility]   [facility]

## 2023-09-21 ENCOUNTER — PATIENT OUTREACH (OUTPATIENT)
Dept: RADIATION ONCOLOGY | Facility: CLINIC | Age: 53
End: 2023-09-21
Payer: COMMERCIAL

## 2023-09-21 NOTE — TELEPHONE ENCOUNTER
Beauhart message regarding new hair loss from patient reviewed with Dr. Pereira, discussion had regarding further endocrinology work-up other than thyroid work-up alone.  Reviewed patient returning to PCP for further discussion regarding this and PCP management versus referral to endocrinologist.  Patient was contacted via telephone and above information was reviewed.  Patient reports she will contact PCP and further discuss additional endocrinology work-up regarding labs with PCP or referral.  Informed patient that if PCP would like to have further discussion with Dr. Pereira, this RN can help arrange.  Patient verbalized understanding of all information and had no questions at this time.    Darline Villa, RN BSN OCN CBCN

## 2023-09-27 ENCOUNTER — PRE VISIT (OUTPATIENT)
Dept: OTOLARYNGOLOGY | Facility: CLINIC | Age: 53
End: 2023-09-27

## 2023-09-27 ENCOUNTER — OFFICE VISIT (OUTPATIENT)
Dept: OTOLARYNGOLOGY | Facility: CLINIC | Age: 53
End: 2023-09-27
Payer: COMMERCIAL

## 2023-09-27 DIAGNOSIS — G50.1 ATYPICAL FACIAL PAIN: Primary | ICD-10-CM

## 2023-09-27 DIAGNOSIS — C80.1 ADENOCARCINOMA (H): ICD-10-CM

## 2023-09-27 DIAGNOSIS — J01.01 ACUTE RECURRENT MAXILLARY SINUSITIS: ICD-10-CM

## 2023-09-27 PROCEDURE — 99000 SPECIMEN HANDLING OFFICE-LAB: CPT | Performed by: PATHOLOGY

## 2023-09-27 PROCEDURE — 87070 CULTURE OTHR SPECIMN AEROBIC: CPT | Performed by: PHYSICIAN ASSISTANT

## 2023-09-27 PROCEDURE — 87075 CULTR BACTERIA EXCEPT BLOOD: CPT | Performed by: PHYSICIAN ASSISTANT

## 2023-09-27 PROCEDURE — 99212 OFFICE O/P EST SF 10 MIN: CPT | Mod: 25 | Performed by: PHYSICIAN ASSISTANT

## 2023-09-27 PROCEDURE — 31231 NASAL ENDOSCOPY DX: CPT | Performed by: PHYSICIAN ASSISTANT

## 2023-09-27 PROCEDURE — 87077 CULTURE AEROBIC IDENTIFY: CPT | Performed by: PHYSICIAN ASSISTANT

## 2023-09-27 NOTE — LETTER
9/27/2023       RE: Rosalind Murphy  [address] [facility] [address]     Dear Colleague,    Thank you for referring your patient, Rosalind Murphy, to the [facility] EAR NOSE AND THROAT CLINIC [place] [facility]. Please see a copy of my visit note below.      Otolaryngology Clinic  September 27, 2023    Chief Complaint:   Sinusitis       History of Present Illness:   Rosalind Murphy is a 53 year old female who has a history of left sinonasal adenocarcinoma s/p .      Past Medical History:  Past Medical History:   Diagnosis Date    History of hysterectomy 03/06/2015    Hypothyroidism     Motion sickness     PONV (postoperative nausea and vomiting)     Primary adenocarcinoma of nasal cavity (H) 12/23/2022    S/P radiation therapy     6,000 cGy to sinonasal completed on 5/3/2023 - [facility]       Past Surgical History:  Past Surgical History:   Procedure Laterality Date    CATARACT IOL, RT/LT Left     as a child    HYSTERECTOMY  2015    LAPAROTOMY EXPLORATORY  2015    OPTICAL TRACKING SYSTEM ENDOSCOPIC EXCISION SINUS TUMOR Left 1/17/2023    Procedure: EXCISION, NEOPLASM, PARANASAL SINUS, ENDOSCOPIC, USING OPTICAL TRACKING SYSTEM;  Surgeon: Germain Vyas MD;  Location: [facility] OR    OPTICAL TRACKING SYSTEM ENDOSCOPIC EXCISION SINUS TUMOR Bilateral 2/2/2023    Procedure: stealth assisted endoscopic endonasal anterior craniofacial resection, nasoseptal flap;  Surgeon: Germain Vyas MD;  Location: [facility] OR    NV BIOPSY NASAL LESION  12/23/2022    PROCURE GRAFT FAT Left 2/2/2023    Procedure: fascia elsy graft, left upper thigh;  Surgeon: Germain Vyas MD;  Location: [facility] OR    RETINAL DETACHMENT SURGERY Left 2019    TONSILLECTOMY      age 6       Medications:  Current Outpatient Medications   Medication Sig Dispense Refill    diclofenac (VOLTAREN) 1 % topical gel Apply 2 g topically 2 times daily as needed      ibuprofen (ADVIL/MOTRIN) 200 MG  tablet Take 200 mg by mouth every 4 hours as needed for pain      levocetirizine (XYZAL) 5 MG tablet Take by mouth every evening      levothyroxine (SYNTHROID/LEVOTHROID) 75 MCG tablet Take 75 mcg by mouth daily      Misc Natural Product Nasal (PONARIS) SOLN Spray 2 drops in nostril 3 times daily 30 mL 3    ondansetron (ZOFRAN ODT) 4 MG ODT tab Take 1 tablet (4 mg) by mouth every 8 hours as needed for nausea 10 tablet 0    propranolol (INDERAL) 20 MG tablet Take 20 mg by mouth 2 times daily      rizatriptan (MAXALT) 10 MG tablet       acetaminophen (TYLENOL) 325 MG tablet Take 325-650 mg by mouth every 6 hours as needed for mild pain (Patient not taking: Reported on 8/18/2023)      Ascorbic Acid (VITAMIN C) 500 MG CAPS Take 1 capsule by mouth daily         Allergies:  Allergies   Allergen Reactions    Doxycycline Headache and Nausea     And Headaches      Hydroxychloroquine Headache and Tinnitus    Topiramate Tinnitus     Worsening Tinnitus      Scopolamine Other (See Comments)     Metal taste in mouth for days        Social History:  Social History     Tobacco Use    Smoking status: Never    Smokeless tobacco: Never   Substance Use Topics    Alcohol use: Yes     Comment: social use per patient    Drug use: Never       ROS: 10 point ROS neg other than the symptoms noted above in the HPI.    Physical Exam:    There were no vitals taken for this visit.     Constitutional:  The patient was unaccompanied, well-groomed, and in no acute distress.     Skin: Normal:  warm and pink without rash    Neurologic: Alert and oriented x 3. Nasal voice.    Psychiatric: The patient's affect was calm, cooperative, and appropriate.     Communication:  Normal; communicates verbally, nasal voice quality.    Respiratory: Breathing comfortably without stridor or exertion of accessory muscles.   Head/Face:  Normocephalic and atraumatic.  No lesions or scars.    Eyes: Extraocular movement intact. Clear sclera.   Ears: Normal hearing, no  external deformity   Nose: See scope below.   Oral Cavity: Normal tongue, moist.      Procedure Note  Procedure performed: Rigid nasal endoscopy  Indication: To evaluate for sinonasal pathology not visualized on routine anterior rhinoscopy  Anesthesia: 4% topical lidocaine with 0.05 % oxymetazoline  Description of procedure: A 0 degree, 4 mm rigid endoscope was inserted into bilateral nasal cavities and the nasal valves, nasal cavity, middle meatus, sphenoethmoid recess, nasopharynx were evaluated for evidence of obstruction, edema, purulence, polyps and/or mass/lesion.     [place]-Imtiaz Endoscopic Scoring System  Endoscopic observation Right Left   Polyps in middle meatus (0 = absent, 1 = restricted to middle meatus, 2 = Beyond middle meatus) 0 0   Discharge (0 = absent, 1 = thin and clear, 2 = thick, purulent) 1 2   Edema (0 = absent, 1 = mild-moderate, 2 = moderate-severe) 2 0   Crusting (0 = absent, 1 = mild-moderate, 2 = moderate-severe) 0 2   Scarring (0= absent, 1 = mild-moderate, 2 = moderate-severe) 0 1   Total 3 5     Findings  RT: Edematous inferior turbinate, thin cloudy drainage, narrow nasal passage  LT: Evidence of radiation, green crusting throughout passage, thick yellow drainage in superior aspect of cavity originating from middle meatus. Drainage suctioned from both nasal passages and sent for culture.    The patient tolerated the procedure well without complication.     Assessment and Plan:  1. Atypical facial pain  2. Acute recurrent maxillary sinusitis  3. History of sinonasal adenocarcinoma  Results of culture will be communicated with patient and sensitive antibiotic will be prescribed.       - Anaerobic Bacterial Culture Routine  - Respiratory Aerobic Bacterial Culture        Patient will follow up as scheduled    Giselle Massey PA-C  Otolaryngology  Head & Neck Surgery  [phone]    Review of external notes as documented elsewhere in note  15 minutes spent by me on the date of the  encounter doing chart review, history and exam, documentation and further activities per the note    Documented time does not include time spent on above procedure.

## 2023-09-27 NOTE — PATIENT INSTRUCTIONS
1. Plan to hear from us once we have received your culture results, we will recommend a plan of care based on the results   2. Please call the ENT clinic with any questions,concerns, new or worsening symptoms.    -Clinic number is [phone]   - Syeda's direct line (Dr. Lou nurse care coordinator) [phone]

## 2023-09-27 NOTE — PROGRESS NOTES
Otolaryngology Clinic  September 27, 2023    Chief Complaint:   Sinusitis       History of Present Illness:   Rosalind Murphy is a 53 year old female who has a history of left sinonasal adenocarcinoma s/p .      Past Medical History:  Past Medical History:   Diagnosis Date    History of hysterectomy 03/06/2015    Hypothyroidism     Motion sickness     PONV (postoperative nausea and vomiting)     Primary adenocarcinoma of nasal cavity (H) 12/23/2022    S/P radiation therapy     6,000 cGy to sinonasal completed on 5/3/2023 [facility]       Past Surgical History:  Past Surgical History:   Procedure Laterality Date    CATARACT IOL, RT/LT Left     as a child    HYSTERECTOMY  2015    LAPAROTOMY EXPLORATORY  2015    OPTICAL TRACKING SYSTEM ENDOSCOPIC EXCISION SINUS TUMOR Left 1/17/2023    Procedure: EXCISION, NEOPLASM, PARANASAL SINUS, ENDOSCOPIC, USING OPTICAL TRACKING SYSTEM;  Surgeon: Germain Vyas MD;  Location: [facility] OR    OPTICAL TRACKING SYSTEM ENDOSCOPIC EXCISION SINUS TUMOR Bilateral 2/2/2023    Procedure: stealth assisted endoscopic endonasal anterior craniofacial resection, nasoseptal flap;  Surgeon: Germain Vyas MD;  Location: [facility] OR    AR BIOPSY NASAL LESION  12/23/2022    PROCURE GRAFT FAT Left 2/2/2023    Procedure: fascia elsy graft, left upper thigh;  Surgeon: Germain Vyas MD;  Location: [facility] OR    RETINAL DETACHMENT SURGERY Left 2019    TONSILLECTOMY      age 6       Medications:  Current Outpatient Medications   Medication Sig Dispense Refill    diclofenac (VOLTAREN) 1 % topical gel Apply 2 g topically 2 times daily as needed      ibuprofen (ADVIL/MOTRIN) 200 MG tablet Take 200 mg by mouth every 4 hours as needed for pain      levocetirizine (XYZAL) 5 MG tablet Take by mouth every evening      levothyroxine (SYNTHROID/LEVOTHROID) 75 MCG tablet Take 75 mcg by mouth daily      Misc Natural Product Nasal (PONARIS) SOLN Spray 2 drops in nostril 3 times daily 30 mL 3     ondansetron (ZOFRAN ODT) 4 MG ODT tab Take 1 tablet (4 mg) by mouth every 8 hours as needed for nausea 10 tablet 0    propranolol (INDERAL) 20 MG tablet Take 20 mg by mouth 2 times daily      rizatriptan (MAXALT) 10 MG tablet       acetaminophen (TYLENOL) 325 MG tablet Take 325-650 mg by mouth every 6 hours as needed for mild pain (Patient not taking: Reported on 8/18/2023)      Ascorbic Acid (VITAMIN C) 500 MG CAPS Take 1 capsule by mouth daily         Allergies:  Allergies   Allergen Reactions    Doxycycline Headache and Nausea     And Headaches      Hydroxychloroquine Headache and Tinnitus    Topiramate Tinnitus     Worsening Tinnitus      Scopolamine Other (See Comments)     Metal taste in mouth for days        Social History:  Social History     Tobacco Use    Smoking status: Never    Smokeless tobacco: Never   Substance Use Topics    Alcohol use: Yes     Comment: social use per patient    Drug use: Never       ROS: 10 point ROS neg other than the symptoms noted above in the HPI.    Physical Exam:    There were no vitals taken for this visit.     Constitutional:  The patient was unaccompanied, well-groomed, and in no acute distress.     Skin: Normal:  warm and pink without rash    Neurologic: Alert and oriented x 3. Nasal voice.    Psychiatric: The patient's affect was calm, cooperative, and appropriate.     Communication:  Normal; communicates verbally, nasal voice quality.    Respiratory: Breathing comfortably without stridor or exertion of accessory muscles.   Head/Face:  Normocephalic and atraumatic.  No lesions or scars.    Eyes: Extraocular movement intact. Clear sclera.   Ears: Normal hearing, no external deformity   Nose: See scope below.   Oral Cavity: Normal tongue, moist.      Procedure Note  Procedure performed: Rigid nasal endoscopy  Indication: To evaluate for sinonasal pathology not visualized on routine anterior rhinoscopy  Anesthesia: 4% topical lidocaine with 0.05 % oxymetazoline  Description  of procedure: A 0 degree, 4 mm rigid endoscope was inserted into bilateral nasal cavities and the nasal valves, nasal cavity, middle meatus, sphenoethmoid recess, nasopharynx were evaluated for evidence of obstruction, edema, purulence, polyps and/or mass/lesion.     [place]-Imtiaz Endoscopic Scoring System  Endoscopic observation Right Left   Polyps in middle meatus (0 = absent, 1 = restricted to middle meatus, 2 = Beyond middle meatus) 0 0   Discharge (0 = absent, 1 = thin and clear, 2 = thick, purulent) 1 2   Edema (0 = absent, 1 = mild-moderate, 2 = moderate-severe) 2 0   Crusting (0 = absent, 1 = mild-moderate, 2 = moderate-severe) 0 2   Scarring (0= absent, 1 = mild-moderate, 2 = moderate-severe) 0 1   Total 3 5     Findings  RT: Edematous inferior turbinate, thin cloudy drainage, narrow nasal passage  LT: Evidence of radiation, green crusting throughout passage, thick yellow drainage in superior aspect of cavity originating from middle meatus. Drainage suctioned from both nasal passages and sent for culture.    The patient tolerated the procedure well without complication.     Assessment and Plan:  1. Atypical facial pain  2. Acute recurrent maxillary sinusitis  3. History of sinonasal adenocarcinoma  Results of culture will be communicated with patient and sensitive antibiotic will be prescribed.       - Anaerobic Bacterial Culture Routine  - Respiratory Aerobic Bacterial Culture        Patient will follow up as scheduled    Giselle Massey PA-C  Otolaryngology  Head & Neck Surgery  [phone]    Review of external notes as documented elsewhere in note  15 minutes spent by me on the date of the encounter doing chart review, history and exam, documentation and further activities per the note    Documented time does not include time spent on above procedure.

## 2023-09-30 LAB — BACTERIA SPEC CULT: ABNORMAL

## 2023-10-02 DIAGNOSIS — J01.01 ACUTE RECURRENT MAXILLARY SINUSITIS: Primary | ICD-10-CM

## 2023-10-02 RX ORDER — DOXYCYCLINE HYCLATE 100 MG
100 TABLET ORAL 2 TIMES DAILY
Qty: 28 TABLET | Refills: 0 | Status: CANCELLED | OUTPATIENT
Start: 2023-10-02 | End: 2023-10-16

## 2023-10-02 RX ORDER — CEPHALEXIN 500 MG/1
500 CAPSULE ORAL 3 TIMES DAILY
Qty: 42 CAPSULE | Refills: 0 | Status: SHIPPED | OUTPATIENT
Start: 2023-10-02 | End: 2023-10-16

## 2023-10-02 NOTE — PROGRESS NOTES
Author spoke with patient about medication.     She verbalized understanding and will call if she needs anything.       Syeda Jenkins RN on 10/2/2023 at 4:19 PM

## 2023-10-04 LAB — BACTERIA SPEC CULT: NORMAL

## 2023-10-09 ENCOUNTER — TRANSCRIBE ORDERS (OUTPATIENT)
Dept: OTHER | Age: 53
End: 2023-10-09

## 2023-10-09 DIAGNOSIS — C30.0 PRIMARY ADENOCARCINOMA OF NASAL CAVITY (H): Primary | ICD-10-CM

## 2023-11-02 ENCOUNTER — ANCILLARY PROCEDURE (OUTPATIENT)
Dept: CT IMAGING | Facility: CLINIC | Age: 53
End: 2023-11-02
Attending: OTOLARYNGOLOGY
Payer: COMMERCIAL

## 2023-11-02 ENCOUNTER — ANCILLARY PROCEDURE (OUTPATIENT)
Dept: MRI IMAGING | Facility: CLINIC | Age: 53
End: 2023-11-02
Attending: OTOLARYNGOLOGY
Payer: COMMERCIAL

## 2023-11-02 DIAGNOSIS — C79.31 SINUS CANCER WITH INTRACRANIAL EXTENSION (H): ICD-10-CM

## 2023-11-02 DIAGNOSIS — C31.9 SINUS CANCER WITH INTRACRANIAL EXTENSION (H): ICD-10-CM

## 2023-11-02 DIAGNOSIS — C80.1 ADENOCARCINOMA (H): ICD-10-CM

## 2023-11-02 DIAGNOSIS — J34.89 NASAL OBSTRUCTION: ICD-10-CM

## 2023-11-02 PROCEDURE — 70543 MRI ORBT/FAC/NCK W/O &W/DYE: CPT | Mod: GC | Performed by: RADIOLOGY

## 2023-11-02 PROCEDURE — 71250 CT THORAX DX C-: CPT | Performed by: RADIOLOGY

## 2023-11-02 PROCEDURE — A9585 GADOBUTROL INJECTION: HCPCS | Mod: JZ | Performed by: RADIOLOGY

## 2023-11-02 RX ORDER — GADOBUTROL 604.72 MG/ML
7.5 INJECTION INTRAVENOUS ONCE
Status: COMPLETED | OUTPATIENT
Start: 2023-11-02 | End: 2023-11-02

## 2023-11-02 RX ADMIN — GADOBUTROL 7.5 ML: 604.72 INJECTION INTRAVENOUS at 14:41

## 2023-11-08 NOTE — PATIENT INSTRUCTIONS
1. Please follow-up in clinic in 3 months with a CT chest    -Please start tobramycin antibiotic nasal rinses. This was sent to a compounding pharmacy in [place].     -Try Ka 'Dwayne or Truvani super food shakes. You can blend it with peanut butter and banana or other fruit for extra calories. I recommend at minimum one shake a day      2. Please call the ENT clinic with any questions,concerns, new or worsening symptoms.    -Clinic number is [phone]   - Syeda's direct line (Dr. Lou's nurse) [phone]

## 2023-11-09 ENCOUNTER — OFFICE VISIT (OUTPATIENT)
Dept: OTOLARYNGOLOGY | Facility: CLINIC | Age: 53
End: 2023-11-09
Payer: COMMERCIAL

## 2023-11-09 VITALS
DIASTOLIC BLOOD PRESSURE: 75 MMHG | HEIGHT: 68 IN | SYSTOLIC BLOOD PRESSURE: 115 MMHG | WEIGHT: 144 LBS | HEART RATE: 64 BPM | BODY MASS INDEX: 21.82 KG/M2 | OXYGEN SATURATION: 100 %

## 2023-11-09 DIAGNOSIS — C30.0 PRIMARY ADENOCARCINOMA OF NASAL CAVITY (H): Primary | ICD-10-CM

## 2023-11-09 DIAGNOSIS — J01.01 ACUTE RECURRENT MAXILLARY SINUSITIS: ICD-10-CM

## 2023-11-09 PROCEDURE — 87077 CULTURE AEROBIC IDENTIFY: CPT | Performed by: OTOLARYNGOLOGY

## 2023-11-09 PROCEDURE — 99214 OFFICE O/P EST MOD 30 MIN: CPT | Mod: 25 | Performed by: OTOLARYNGOLOGY

## 2023-11-09 PROCEDURE — 99000 SPECIMEN HANDLING OFFICE-LAB: CPT | Performed by: PATHOLOGY

## 2023-11-09 PROCEDURE — 87070 CULTURE OTHR SPECIMN AEROBIC: CPT | Performed by: OTOLARYNGOLOGY

## 2023-11-09 PROCEDURE — 31237 NSL/SINS NDSC SURG BX POLYPC: CPT | Mod: 50 | Performed by: OTOLARYNGOLOGY

## 2023-11-09 RX ORDER — SPIRONOLACTONE 25 MG/1
25 TABLET ORAL EVERY MORNING
COMMUNITY

## 2023-11-09 ASSESSMENT — PAIN SCALES - GENERAL: PAINLEVEL: NO PAIN (0)

## 2023-11-09 NOTE — PROGRESS NOTES
Procedure Date: 02/02/2023     PREOPERATIVE DIAGNOSES:    1.  Intestinal type sinonasal adenocarcinoma.  2.  Nasal obstruction.     PREOPERATIVE DIAGNOSES:    1.  Intestinal type sinonasal adenocarcinoma.  2.  Nasal obstruction.     PROCEDURE PERFORMED:     1.  Endoscopic endonasal transcribriform resection of anterior cranial fossa intradural tumor, left sinonasal adenocarcinoma.  2.  [place] of right-sided pedicled nasoseptal flap pedicled off the posterior septal branch of sphenopalatine artery.     SURGEON:  Germain Vyas MD     CO-SURGEON:  Abhi Tidwell MD    Completed radiation : 5/3/2023 in [place]    Last MRI 11/02/2023: Impression: Postsurgical left sinonasal resection changes with moderate mucosal thickening along the resection site and within the  paranasal sinuses. No evidence of residual or recurrent tumor.    Chest CT 11/02/2023:   1. Multiple lingular distal segmental level 2 mm endobronchial nodules. Recommend follow-up within 3 months to try and document  clearing. The previous left lower lobe groundglass opacity has  completely resolved.  2. Liver cyst.  3. Mild biapical scarring in the lungs.    History of Present Illness: 53-year-old female here in the otolaryngology clinic for follow-up regarding her intestinal type adenocarcinoma.  The patient is complaining mainly of sinonasal obstruction mainly on the right side.  Also abundant sinonasal crust.  Patient is having a lot of difficulty with fluid because the lack of taste and smell.  Patient denies epistaxis.  She denies any pain.  No visual changes.    MEDICATIONS:     Current Outpatient Medications   Medication Sig Dispense Refill    acetaminophen (TYLENOL) 325 MG tablet Take 325-650 mg by mouth every 6 hours as needed for mild pain      COMPOUNDED NON-CONTROLLED SUBSTANCE (CMPD RX) - PHARMACY TO MIX COMPOUNDED MEDICATION Open Tobramycin capsule and empty contents into 240 ml of nasal saline mixture. Rinse each nasal cavity with 120  ml of mixture twice daily. 60 capsule 1    diclofenac (VOLTAREN) 1 % topical gel Apply 2 g topically 2 times daily as needed      ibuprofen (ADVIL/MOTRIN) 200 MG tablet Take 200 mg by mouth every 4 hours as needed for pain      levocetirizine (XYZAL) 5 MG tablet Take by mouth every evening      levothyroxine (SYNTHROID/LEVOTHROID) 75 MCG tablet Take 75 mcg by mouth daily      Misc Natural Product Nasal (PONARIS) SOLN Spray 2 drops in nostril 3 times daily 30 mL 3    ondansetron (ZOFRAN ODT) 4 MG ODT tab Take 1 tablet (4 mg) by mouth every 8 hours as needed for nausea 10 tablet 0    propranolol (INDERAL) 20 MG tablet Take 20 mg by mouth 2 times daily      rizatriptan (MAXALT) 10 MG tablet       spironolactone (ALDACTONE) 25 MG tablet Take 25 mg by mouth every morning      Ascorbic Acid (VITAMIN C) 500 MG CAPS Take 1 capsule by mouth daily         ALLERGIES:    Allergies   Allergen Reactions    Doxycycline Headache and Nausea     And Headaches      Hydroxychloroquine Headache and Tinnitus    Topiramate Tinnitus     Worsening Tinnitus      Scopolamine Other (See Comments)     Metal taste in mouth for days         PAST MEDICAL HISTORY:   Past Medical History:   Diagnosis Date    History of hysterectomy 03/06/2015    Hypothyroidism     Motion sickness     PONV (postoperative nausea and vomiting)     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    PHYSICAL EXAMINATION:      Constitutional:  The patient was accompanied, well-groomed, and in no acute  distress.     Skin: Normal:  warm and pink without rash   Neurologic: Alert and oriented x 3.  CN's III-XII within normal limits.  Voice normal.    Psychiatric: The patient's affect was calm, cooperative, and appropriate.     Communication:  Normal; communicates verbally, normal voice quality.   Respiratory: Breathing comfortably without stridor or exertion of accessory muscles.    Head/Face:  Normocephalic and atraumatic.  No lesions or scars. No sinus tenderness.    Salivary glands -  Normal size, no tenderness, swelling, or palpable masses   Eyes: Pupils were equal and reactive.  Extraocular movement intact.     Ears: Pinnae and tragus non-tender.  EAC's and TM's were clear.      Nose: Sinuses were non-tender.  Anterior rhinoscopy revealed midline septum and absence of purulence or polyps.     Oral Cavity: Normal tongue, floor of mouth, buccal mucosa, and palate.  No lesions or masses on inspection or palpation.     Oropharynx: Normal mucosa, palate symmetric with normal elevation. No abnormal lymph tissue in the oropharynx.     Hypopharynx: Mirror exam shows absence of pooled secretions and normal  pyriform sinus and pharyngeal wall mucosa.   Larynx: Mirror exam shows sharp epiglottis, false vocal cords, true vocal cords.  Mobile arytenoids and cords.  Cords are clear and mobile.     Neck: Supple with normal laryngeal and tracheal landmarks.  The parotid beds were without masses.  No palpable thyroid.  Normal range of motion   Lymphatic: There is no palpable lymphadenopathy in the neck.        Nasal endoscopy: Consent for nasal endoscopy was obtained.  I confirmed correctness of the procedure and identity of the patient.  Nasal endoscopy was indicated due to intestinal type adenocarcinoma.  The nose was topically decongested and anesthetized.  The nasal endoscope was passed under endoscopic vision.  On the left side I did remove abundant sinonasal crust.  There is no evidence of masses or lesions on the left  side.  On the right side I removed some sinonasal crust.  The middle turbinate is still present.  There is a large synechia between the inferior turbinate and the septum.  No evidence of masses or lesions on the right side.       IMPRESSION AND PLAN: 53-year-old female with a diagnosis of sinonasal intestinal type adenocarcinoma status post surgery followed by radiation therapy.  Patient is experiencing all the side effects of treatment.  I had a long discussion with her today about managing all her symptoms.  A today's examination did not reveal any evidence of local regional disease.  The CT chest did show very small in the bronchial nodules.  We are going to obtain another CT in 3 months and I would like to see her back in 3 months for another clinical evaluation.      Lorenzo Hamilton MD, M.D.  Otolaryngology- Head & Neck Surgery  [phone]

## 2023-11-09 NOTE — LETTER
11/9/2023       RE: Rosalind Murphy  [address] [facility] [address]     Dear Colleague,    Thank you for referring your patient, Rosalind Murphy, to the [facility] EAR NOSE AND THROAT CLINIC [place] at [facility]. Please see a copy of my visit note below.    Procedure Date: 02/02/2023     PREOPERATIVE DIAGNOSES:    1.  Intestinal type sinonasal adenocarcinoma.  2.  Nasal obstruction.     PREOPERATIVE DIAGNOSES:    1.  Intestinal type sinonasal adenocarcinoma.  2.  Nasal obstruction.     PROCEDURE PERFORMED:     1.  Endoscopic endonasal transcribriform resection of anterior cranial fossa intradural tumor, left sinonasal adenocarcinoma.  2.  [place] of right-sided pedicled nasoseptal flap pedicled off the posterior septal branch of sphenopalatine artery.     SURGEON:  Germain Vyas MD     CO-SURGEON:  Abhi Tidwell MD    Completed radiation : 5/3/2023 in [place]    Last MRI 11/02/2023: Impression: Postsurgical left sinonasal resection changes with moderate mucosal thickening along the resection site and within the  paranasal sinuses. No evidence of residual or recurrent tumor.    Chest CT 11/02/2023:   1. Multiple lingular distal segmental level 2 mm endobronchial nodules. Recommend follow-up within 3 months to try and document  clearing. The previous left lower lobe groundglass opacity has  completely resolved.  2. Liver cyst.  3. Mild biapical scarring in the lungs.    History of Present Illness: 53-year-old female here in the otolaryngology clinic for follow-up regarding her intestinal type adenocarcinoma.  The patient is complaining mainly of sinonasal obstruction mainly on the right side.  Also abundant sinonasal crust.  Patient is having a lot of difficulty with fluid because the lack of taste and smell.  Patient denies epistaxis.  She denies any pain.  No visual changes.    MEDICATIONS:     Current Outpatient Medications   Medication Sig  Dispense Refill    acetaminophen (TYLENOL) 325 MG tablet Take 325-650 mg by mouth every 6 hours as needed for mild pain      COMPOUNDED NON-CONTROLLED SUBSTANCE (CMPD RX) - PHARMACY TO MIX COMPOUNDED MEDICATION Open Tobramycin capsule and empty contents into 240 ml of nasal saline mixture. Rinse each nasal cavity with 120 ml of mixture twice daily. 60 capsule 1    diclofenac (VOLTAREN) 1 % topical gel Apply 2 g topically 2 times daily as needed      ibuprofen (ADVIL/MOTRIN) 200 MG tablet Take 200 mg by mouth every 4 hours as needed for pain      levocetirizine (XYZAL) 5 MG tablet Take by mouth every evening      levothyroxine (SYNTHROID/LEVOTHROID) 75 MCG tablet Take 75 mcg by mouth daily      Misc Natural Product Nasal (PONARIS) SOLN Spray 2 drops in nostril 3 times daily 30 mL 3    ondansetron (ZOFRAN ODT) 4 MG ODT tab Take 1 tablet (4 mg) by mouth every 8 hours as needed for nausea 10 tablet 0    propranolol (INDERAL) 20 MG tablet Take 20 mg by mouth 2 times daily      rizatriptan (MAXALT) 10 MG tablet       spironolactone (ALDACTONE) 25 MG tablet Take 25 mg by mouth every morning      Ascorbic Acid (VITAMIN C) 500 MG CAPS Take 1 capsule by mouth daily         ALLERGIES:    Allergies   Allergen Reactions    Doxycycline Headache and Nausea     And Headaches      Hydroxychloroquine Headache and Tinnitus    Topiramate Tinnitus     Worsening Tinnitus      Scopolamine Other (See Comments)     Metal taste in mouth for days         PAST MEDICAL HISTORY:   Past Medical History:   Diagnosis Date    History of hysterectomy 03/06/2015    Hypothyroidism     Motion sickness     PONV (postoperative nausea and vomiting)     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    PHYSICAL EXAMINATION:      Constitutional:  The patient was accompanied, well-groomed, and in no acute distress.     Skin: Normal:  warm and pink without rash   Neurologic: Alert and oriented x 3.  CN's III-XII within normal limits.  Voice normal.    Psychiatric: The patient's affect was calm, cooperative, and appropriate.     Communication:  Normal; communicates verbally, normal voice quality.   Respiratory: Breathing comfortably without stridor or exertion of accessory muscles.    Head/Face:  Normocephalic and atraumatic.  No lesions or scars. No sinus tenderness.    Salivary glands -  Normal size, no tenderness, swelling, or palpable masses   Eyes: Pupils were equal and reactive.  Extraocular movement intact.     Ears: Pinnae and tragus non-tender.  EAC's and TM's were clear.      Nose: Sinuses were non-tender.  Anterior rhinoscopy revealed midline septum and absence of purulence or polyps.     Oral Cavity: Normal tongue, floor of mouth, buccal mucosa, and palate.  No lesions or masses on inspection or palpation.     Oropharynx: Normal mucosa, palate symmetric with normal elevation. No abnormal lymph tissue in the oropharynx.     Hypopharynx: Mirror exam shows absence of pooled secretions and normal  pyriform sinus and pharyngeal wall mucosa.   Larynx: Mirror exam shows sharp epiglottis, false vocal cords, true vocal cords.  Mobile arytenoids and cords.  Cords are clear and mobile.     Neck: Supple with normal laryngeal and tracheal landmarks.  The parotid beds were without masses.  No palpable thyroid.  Normal range of motion   Lymphatic: There is no palpable lymphadenopathy in the neck.        Nasal endoscopy: Consent for nasal endoscopy was obtained.  I confirmed correctness of the procedure and  identity of the patient.  Nasal endoscopy was indicated due to intestinal type adenocarcinoma.  The nose was topically decongested and anesthetized.  The nasal endoscope was passed under endoscopic vision.  On the left side I did remove abundant sinonasal crust.  There is no evidence of masses or lesions on the left side.  On the right side I removed some sinonasal crust.  The middle turbinate is still present.  There is a large synechia between the inferior turbinate and the septum.  No evidence of masses or lesions on the right side.       IMPRESSION AND PLAN: 53-year-old female with a diagnosis of sinonasal intestinal type adenocarcinoma status post surgery followed by radiation therapy.  Patient is experiencing all the side effects of treatment.  I had a long discussion with her today about managing all her symptoms.  A today's examination did not reveal any evidence of local regional disease.  The CT chest did show very small in the bronchial nodules.  We are going to obtain another CT in 3 months and I would like to see her back in 3 months for another clinical evaluation.      Lorenzo Hamilton MD, M.D.  Otolaryngology- Head & Neck Surgery  [phone]

## 2023-11-12 LAB — BACTERIA SPEC CULT: ABNORMAL

## 2023-11-13 DIAGNOSIS — J01.01 ACUTE RECURRENT MAXILLARY SINUSITIS: Primary | ICD-10-CM

## 2023-11-13 RX ORDER — CLINDAMYCIN HCL 300 MG
300 CAPSULE ORAL 3 TIMES DAILY
Qty: 42 CAPSULE | Refills: 0 | Status: SHIPPED | OUTPATIENT
Start: 2023-11-13 | End: 2023-11-27

## 2023-11-13 NOTE — PROGRESS NOTES
Signed Prescriptions:                        Disp   Refills    clindamycin (CLEOCIN) 300 MG capsule       42 cap*0        Sig: Take 1 capsule (300 mg) by mouth 3 times daily for 14           days  Authorizing Provider: LEON DE LA ROSA       Patient notified     Syeda Jeknins RN on 11/13/2023 at 8:59 AM

## 2024-01-25 ENCOUNTER — TELEPHONE (OUTPATIENT)
Dept: OTOLARYNGOLOGY | Facility: CLINIC | Age: 54
End: 2024-01-25
Payer: COMMERCIAL

## 2024-01-26 NOTE — TELEPHONE ENCOUNTER
Author received a voicemail from patient regarding returned sinus congestion and infection. She restarted her antibiotic nasal rinses and has a follow-up with us on 2/1.     Syeda Jenkins RN on 1/26/2024 at 9:30 AM

## 2024-02-01 ENCOUNTER — OFFICE VISIT (OUTPATIENT)
Dept: OTOLARYNGOLOGY | Facility: CLINIC | Age: 54
End: 2024-02-01
Payer: COMMERCIAL

## 2024-02-01 VITALS
OXYGEN SATURATION: 100 % | WEIGHT: 142.1 LBS | DIASTOLIC BLOOD PRESSURE: 67 MMHG | HEART RATE: 66 BPM | HEIGHT: 68 IN | SYSTOLIC BLOOD PRESSURE: 115 MMHG | BODY MASS INDEX: 21.54 KG/M2

## 2024-02-01 DIAGNOSIS — C30.0 PRIMARY ADENOCARCINOMA OF NASAL CAVITY (H): Primary | ICD-10-CM

## 2024-02-01 DIAGNOSIS — J32.2 CHRONIC ETHMOIDAL SINUSITIS: ICD-10-CM

## 2024-02-01 DIAGNOSIS — J01.01 ACUTE RECURRENT MAXILLARY SINUSITIS: ICD-10-CM

## 2024-02-01 PROCEDURE — 31237 NSL/SINS NDSC SURG BX POLYPC: CPT | Mod: LT | Performed by: OTOLARYNGOLOGY

## 2024-02-01 PROCEDURE — 99214 OFFICE O/P EST MOD 30 MIN: CPT | Mod: 25 | Performed by: OTOLARYNGOLOGY

## 2024-02-01 RX ORDER — EUCALYPTUS/PEPPERMINT OIL
SOLUTION, NON-ORAL NASAL
Qty: 30 ML | Refills: 11 | Status: SHIPPED | OUTPATIENT
Start: 2024-02-01

## 2024-02-01 ASSESSMENT — PAIN SCALES - GENERAL: PAINLEVEL: NO PAIN (0)

## 2024-02-01 NOTE — PROGRESS NOTES
Procedure Date: 02/02/2023     PREOPERATIVE DIAGNOSES:    1.  Intestinal type sinonasal adenocarcinoma.  2.  Nasal obstruction.     PREOPERATIVE DIAGNOSES:    1.  Intestinal type sinonasal adenocarcinoma.  2.  Nasal obstruction.     PROCEDURE PERFORMED:     1.  Endoscopic endonasal transcribriform resection of anterior cranial fossa intradural tumor, left sinonasal adenocarcinoma.  2.  [place] of right-sided pedicled nasoseptal flap pedicled off the posterior septal branch of sphenopalatine artery.     SURGEON:  Germain Vyas MD     CO-SURGEON:  Abhi Tidwell MD     Completed radiation : 5/3/2023 in [place]      History of Present Illness: Patient is back to the otolaryngology clinic with symptoms of nasal congestion, crusting, postnasal drainage, frontal sinus pressure.  She is started antibiotic rinses couple weeks ago.    MEDICATIONS:     Current Outpatient Medications   Medication Sig Dispense Refill    COMPOUNDED NON-CONTROLLED SUBSTANCE (CMPD RX) - PHARMACY TO MIX COMPOUNDED MEDICATION Open Tobramycin capsule and empty contents into 240 ml of nasal saline mixture. Rinse each nasal cavity with 120 ml of mixture twice daily. 60 capsule 1    levothyroxine (SYNTHROID/LEVOTHROID) 75 MCG tablet Take 75 mcg by mouth daily      ondansetron (ZOFRAN ODT) 4 MG ODT tab Take 1 tablet (4 mg) by mouth every 8 hours as needed for nausea 10 tablet 0    propranolol (INDERAL) 20 MG tablet Take 20 mg by mouth 2 times daily      rizatriptan (MAXALT) 10 MG tablet       spironolactone (ALDACTONE) 25 MG tablet Take 25 mg by mouth every morning      acetaminophen (TYLENOL) 325 MG tablet Take 325-650 mg by mouth every 6 hours as needed for mild pain      Ascorbic Acid (VITAMIN C) 500 MG CAPS Take 1 capsule by mouth daily      diclofenac (VOLTAREN) 1 % topical gel Apply 2 g topically 2 times daily as needed      ibuprofen (ADVIL/MOTRIN) 200 MG tablet Take 200 mg by mouth every 4 hours as needed for pain      levocetirizine  (XYZAL) 5 MG tablet Take by mouth every evening      Misc Natural Product Nasal (PONARIS) SOLN Spray 2 drops in nostril 3 times daily 30 mL 3       ALLERGIES:    Allergies   Allergen Reactions    Doxycycline Headache and Nausea     And Headaches      Hydroxychloroquine Headache and Tinnitus    Topiramate Tinnitus     Worsening Tinnitus      Scopolamine Other (See Comments)     Metal taste in mouth for days       PAST MEDICAL HISTORY:   Past Medical History:   Diagnosis Date    History of hysterectomy 03/06/2015    Hypothyroidism     Motion sickness     PONV (postoperative nausea and vomiting)     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    Nasal endoscopy: Consent for nasal endoscopy was obtained.  I confirmed correctness of the procedure and identity of the patient.  Nasal endoscopy was indicated due to sinonasal malignancy.  The nose was topically decongested and anesthetized.  Nasal endoscope was passed under endoscopic vision.  On the left side the septum is in the midline.  There is abundant sinonasal crust this was removed today using a curette as well as suction and sinonasal forceps.  The right side is shows synechia.    IMPRESSION AND PLAN: 53-year-old female here in the otolaryngology clinic with sinonasal crusting.  This was debrided today.  On nasal endoscopy I do not see any lesions that are concerning today.  Patient already has an appointment to come back for surveillance in a month.          Lorenzo Hamilton MD,  M.D.  Otolaryngology- Head & Neck Surgery  [phone]

## 2024-02-01 NOTE — LETTER
2/1/2024       RE: Rosalind Murphy  [address] [address] [facility] [address] [facility] [address]     Dear Colleague,    Thank you for referring your patient, Rosalind Murphy, to the [facility] EAR NOSE AND THROAT CLINIC [place] at [facility]. Please see a copy of my visit note below.    Procedure Date: 02/02/2023     PREOPERATIVE DIAGNOSES:    1.  Intestinal type sinonasal adenocarcinoma.  2.  Nasal obstruction.     PREOPERATIVE DIAGNOSES:    1.  Intestinal type sinonasal adenocarcinoma.  2.  Nasal obstruction.     PROCEDURE PERFORMED:     1.  Endoscopic endonasal transcribriform resection of anterior cranial fossa intradural tumor, left sinonasal adenocarcinoma.  2.  [place] of right-sided pedicled nasoseptal flap pedicled off the posterior septal branch of sphenopalatine artery.     SURGEON:  Germain Vyas MD     CO-SURGEON:  Abhi Tidwell MD     Completed radiation : 5/3/2023 in [place]      History of Present Illness: Patient is back to the otolaryngology clinic with symptoms of nasal congestion, crusting, postnasal drainage, frontal sinus pressure.  She is started antibiotic rinses couple weeks ago.    MEDICATIONS:     Current Outpatient Medications   Medication Sig Dispense Refill     COMPOUNDED NON-CONTROLLED SUBSTANCE (CMPD RX) - PHARMACY TO MIX COMPOUNDED MEDICATION Open Tobramycin capsule and empty contents into 240 ml of nasal saline mixture. Rinse each nasal cavity with 120 ml of mixture twice daily. 60 capsule 1     levothyroxine (SYNTHROID/LEVOTHROID) 75 MCG tablet Take 75 mcg by mouth daily       ondansetron (ZOFRAN ODT) 4 MG ODT tab Take 1 tablet (4 mg) by mouth every 8 hours as needed for nausea 10 tablet 0     propranolol (INDERAL) 20 MG tablet Take 20 mg by mouth 2 times daily       rizatriptan (MAXALT) 10 MG tablet        spironolactone (ALDACTONE) 25 MG tablet Take 25 mg by mouth every morning       acetaminophen (TYLENOL) 325 MG tablet Take 325-650 mg by  mouth every 6 hours as needed for mild pain       Ascorbic Acid (VITAMIN C) 500 MG CAPS Take 1 capsule by mouth daily       diclofenac (VOLTAREN) 1 % topical gel Apply 2 g topically 2 times daily as needed       ibuprofen (ADVIL/MOTRIN) 200 MG tablet Take 200 mg by mouth every 4 hours as needed for pain       levocetirizine (XYZAL) 5 MG tablet Take by mouth every evening       Misc Natural Product Nasal (PONARIS) SOLN Spray 2 drops in nostril 3 times daily 30 mL 3       ALLERGIES:    Allergies   Allergen Reactions     Doxycycline Headache and Nausea     And Headaches       Hydroxychloroquine Headache and Tinnitus     Topiramate Tinnitus     Worsening Tinnitus       Scopolamine Other (See Comments)     Metal taste in mouth for days       PAST MEDICAL HISTORY:   Past Medical History:   Diagnosis Date     History of hysterectomy 03/06/2015     Hypothyroidism      Motion sickness      PONV (postoperative nausea and vomiting)      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    Nasal endoscopy: Consent for nasal endoscopy was obtained.  I confirmed correctness of the procedure and identity of the patient.  Nasal endoscopy was indicated due to sinonasal malignancy.  The nose was topically decongested and anesthetized.  Nasal endoscope was passed under endoscopic vision.  On the left side the septum is in the midline.  There is abundant sinonasal crust this was removed today using a curette as well as suction and sinonasal forceps.  The right side  is shows synechia.    IMPRESSION AND PLAN: 53-year-old female here in the otolaryngology clinic with sinonasal crusting.  This was debrided today.  On nasal endoscopy I do not see any lesions that are concerning today.  Patient already has an appointment to come back for surveillance in a month.          Lorenzo Hamilton MD, M.D.  Otolaryngology- Head & Neck Surgery  [phone]

## 2024-03-05 NOTE — PATIENT INSTRUCTIONS
1. Please follow-up in clinic in 2 months   2. Please call the ENT clinic with any questions,concerns, new or worsening symptoms.    -Clinic number is [phone]   - Syeda's direct line (Dr. Lou's nurse) [phone]

## 2024-03-07 ENCOUNTER — ANCILLARY PROCEDURE (OUTPATIENT)
Dept: CT IMAGING | Facility: CLINIC | Age: 54
End: 2024-03-07
Attending: OTOLARYNGOLOGY
Payer: COMMERCIAL

## 2024-03-07 ENCOUNTER — OFFICE VISIT (OUTPATIENT)
Dept: OTOLARYNGOLOGY | Facility: CLINIC | Age: 54
End: 2024-03-07
Payer: COMMERCIAL

## 2024-03-07 VITALS — BODY MASS INDEX: 21.52 KG/M2 | HEIGHT: 68 IN | WEIGHT: 142 LBS

## 2024-03-07 DIAGNOSIS — C30.0 PRIMARY ADENOCARCINOMA OF NASAL CAVITY (H): ICD-10-CM

## 2024-03-07 DIAGNOSIS — J01.01 ACUTE RECURRENT MAXILLARY SINUSITIS: Primary | ICD-10-CM

## 2024-03-07 PROCEDURE — 71260 CT THORAX DX C+: CPT | Mod: GC | Performed by: RADIOLOGY

## 2024-03-07 PROCEDURE — 99214 OFFICE O/P EST MOD 30 MIN: CPT | Mod: 25 | Performed by: OTOLARYNGOLOGY

## 2024-03-07 PROCEDURE — 31237 NSL/SINS NDSC SURG BX POLYPC: CPT | Mod: LT | Performed by: OTOLARYNGOLOGY

## 2024-03-07 RX ORDER — IOPAMIDOL 755 MG/ML
69 INJECTION, SOLUTION INTRAVASCULAR ONCE
Status: COMPLETED | OUTPATIENT
Start: 2024-03-07 | End: 2024-03-07

## 2024-03-07 RX ADMIN — IOPAMIDOL 69 ML: 755 INJECTION, SOLUTION INTRAVASCULAR at 15:56

## 2024-03-07 NOTE — LETTER
3/7/2024       RE: Rosalind Murphy  [address] [facility] [address]     Dear Colleague,    Thank you for referring your patient, Rosalind Murphy, to the [facility] EAR NOSE AND THROAT CLINIC [place] at [facility]. Please see a copy of my visit note below.    Procedure Date: 02/02/2023     PREOPERATIVE DIAGNOSES:    1.  Intestinal type sinonasal adenocarcinoma.  2.  Nasal obstruction.     PREOPERATIVE DIAGNOSES:    1.  Intestinal type sinonasal adenocarcinoma.  2.  Nasal obstruction.     PROCEDURE PERFORMED:     1.  Endoscopic endonasal transcribriform resection of anterior cranial fossa intradural tumor, left sinonasal adenocarcinoma.  2.  [place] of right-sided pedicled nasoseptal flap pedicled off the posterior septal branch of sphenopalatine artery.     SURGEON:  Germain Vyas MD     CO-SURGEON:  Abhi Tidwell MD     Completed radiation : 5/3/2023 in [place]    History of Present Illness: I 54-year-old female here in the otolaryngology clinic for tumor surveillance follow-up.  Patient states that she continues to have difficulty with sinonasal crust.  She is using Ponaris as well as sinonasal rinses with saline.  She appears to be very frustrated by this issue.  She denies any facial pain.  No fever no chills.    MEDICATIONS:     Current Outpatient Medications   Medication Sig Dispense Refill     acetaminophen (TYLENOL) 325 MG tablet Take 325-650 mg by mouth every 6 hours as needed for mild pain       Ascorbic Acid (VITAMIN C) 500 MG CAPS Take 1 capsule by mouth daily       COMPOUNDED NON-CONTROLLED SUBSTANCE (CMPD RX) - PHARMACY TO MIX COMPOUNDED MEDICATION Open Tobramycin capsule and empty contents into 240 ml of nasal saline mixture. Rinse each nasal cavity with 120 ml of mixture twice daily. 60 capsule 1     diclofenac (VOLTAREN) 1 % topical gel Apply 2 g topically 2 times daily as needed       ibuprofen (ADVIL/MOTRIN) 200 MG tablet Take 200  mg by mouth every 4 hours as needed for pain       levocetirizine (XYZAL) 5 MG tablet Take by mouth every evening       levothyroxine (SYNTHROID/LEVOTHROID) 75 MCG tablet Take 75 mcg by mouth daily       Misc Natural Product Nasal (PONARIS) SOLN Use 2-4 drops in each nostril before bedtime. 30 mL 11     Misc Natural Product Nasal (PONARIS) SOLN Spray 2 drops in nostril 3 times daily 30 mL 3     ondansetron (ZOFRAN ODT) 4 MG ODT tab Take 1 tablet (4 mg) by mouth every 8 hours as needed for nausea 10 tablet 0     propranolol (INDERAL) 20 MG tablet Take 20 mg by mouth 2 times daily       rizatriptan (MAXALT) 10 MG tablet        spironolactone (ALDACTONE) 25 MG tablet Take 25 mg by mouth every morning         ALLERGIES:    Allergies   Allergen Reactions     Doxycycline Headache and Nausea     And Headaches       Hydroxychloroquine Headache and Tinnitus     Topiramate Tinnitus     Worsening Tinnitus       Scopolamine Other (See Comments)     Metal taste in mouth for days       PAST MEDICAL HISTORY:   Past Medical History:   Diagnosis Date     History of hysterectomy 03/06/2015     Hypothyroidism      Motion sickness      PONV (postoperative nausea and vomiting)      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  PHYSICAL EXAMINATION:      Constitutional:  The patient was accompanied, well-groomed, and in no acute distress.     Skin: Normal:  warm and pink without rash   Neurologic: Alert and oriented x 3.  CN's III-XII within normal limits.  Voice  normal.    Psychiatric: The patient's affect was calm, cooperative, and appropriate.     Communication:  Normal; communicates verbally, normal voice quality.   Respiratory: Breathing comfortably without stridor or exertion of accessory muscles.    Head/Face:  Normocephalic and atraumatic.  No lesions or scars. No sinus tenderness.    Salivary glands -  Normal size, no tenderness, swelling, or palpable masses   Eyes: Pupils were equal and reactive.  Extraocular movement intact.         Nose: Sinuses were non-tender.  Anterior rhinoscopy revealed midline septum and absence of purulence or polyps.     Oral Cavity: Normal tongue, floor of mouth, buccal mucosa, and palate.  No lesions or masses on inspection or palpation.     Oropharynx: Normal mucosa, palate symmetric with normal elevation. No abnormal lymph tissue in the oropharynx.             Neck: Supple with normal laryngeal and tracheal landmarks.  The parotid beds were without masses.  No palpable thyroid.  Normal range of motion   Lymphatic: There is no palpable lymphadenopathy in the neck.          Nasal endoscopy: Consent for nasal endoscopy was obtained.  I confirmed correctness of the procedure and identity of the patient.  Nasal endoscopy was indicated due to left sinonasal adenocarcinoma.  The nose was topically decongested and anesthetized.  The nasal endoscope was passed under endoscopic vision.  On the left side the septum is deviated to the right.  There is a large sinonasal cavity.  There is abundant crust that was removed today.  After removing the crusting the sinonasal passages are well mucosalized I do not see any evidence of masses or concerning lesions.  The nasopharynx is normal.  On the right side the septum is deviated to the right limiting access.  I do not see any crusting on the right side.       IMPRESSION AND PLAN: 54-year-old female with left sinonasal adenocarcinoma.  Today's examination did not reveal any evidence of local regional  disease.  Plan is to see her back in May with another MRI of the sinuses.  We are going to continue the Ponaris we are going to add sinus rinse with xylitol      Lorenzo Hamilton MD, M.S.  Otolaryngology- Head & Neck Surgery  [phone]

## 2024-03-07 NOTE — PROGRESS NOTES
Procedure Date: 02/02/2023     PREOPERATIVE DIAGNOSES:    1.  Intestinal type sinonasal adenocarcinoma.  2.  Nasal obstruction.     PREOPERATIVE DIAGNOSES:    1.  Intestinal type sinonasal adenocarcinoma.  2.  Nasal obstruction.     PROCEDURE PERFORMED:     1.  Endoscopic endonasal transcribriform resection of anterior cranial fossa intradural tumor, left sinonasal adenocarcinoma.  2.  [place] of right-sided pedicled nasoseptal flap pedicled off the posterior septal branch of sphenopalatine artery.     SURGEON:  Germain Vyas MD     CO-SURGEON:  Abhi Tidwell MD     Completed radiation : 5/3/2023 in [place]    History of Present Illness: I 54-year-old female here in the otolaryngology clinic for tumor surveillance follow-up.  Patient states that she continues to have difficulty with sinonasal crust.  She is using Ponaris as well as sinonasal rinses with saline.  She appears to be very frustrated by this issue.  She denies any facial pain.  No fever no chills.    MEDICATIONS:     Current Outpatient Medications   Medication Sig Dispense Refill    acetaminophen (TYLENOL) 325 MG tablet Take 325-650 mg by mouth every 6 hours as needed for mild pain      Ascorbic Acid (VITAMIN C) 500 MG CAPS Take 1 capsule by mouth daily      COMPOUNDED NON-CONTROLLED SUBSTANCE (CMPD RX) - PHARMACY TO MIX COMPOUNDED MEDICATION Open Tobramycin capsule and empty contents into 240 ml of nasal saline mixture. Rinse each nasal cavity with 120 ml of mixture twice daily. 60 capsule 1    diclofenac (VOLTAREN) 1 % topical gel Apply 2 g topically 2 times daily as needed      ibuprofen (ADVIL/MOTRIN) 200 MG tablet Take 200 mg by mouth every 4 hours as needed for pain      levocetirizine (XYZAL) 5 MG tablet Take by mouth every evening      levothyroxine (SYNTHROID/LEVOTHROID) 75 MCG tablet Take 75 mcg by mouth daily      Misc Natural Product Nasal (PONARIS) SOLN Use 2-4 drops in each nostril before bedtime. 30 mL 11    Misc Natural Product  Nasal (PONARIS) SOLN Spray 2 drops in nostril 3 times daily 30 mL 3    ondansetron (ZOFRAN ODT) 4 MG ODT tab Take 1 tablet (4 mg) by mouth every 8 hours as needed for nausea 10 tablet 0    propranolol (INDERAL) 20 MG tablet Take 20 mg by mouth 2 times daily      rizatriptan (MAXALT) 10 MG tablet       spironolactone (ALDACTONE) 25 MG tablet Take 25 mg by mouth every morning         ALLERGIES:    Allergies   Allergen Reactions    Doxycycline Headache and Nausea     And Headaches      Hydroxychloroquine Headache and Tinnitus    Topiramate Tinnitus     Worsening Tinnitus      Scopolamine Other (See Comments)     Metal taste in mouth for days       PAST MEDICAL HISTORY:   Past Medical History:   Diagnosis Date    History of hysterectomy 03/06/2015    Hypothyroidism     Motion sickness     PONV (postoperative nausea and vomiting)     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  PHYSICAL EXAMINATION:      Constitutional:  The patient was accompanied, well-groomed, and in no acute distress.     Skin: Normal:  warm and pink without rash   Neurologic: Alert and oriented x 3.  CN's III-XII within normal limits.  Voice normal.    Psychiatric: The patient's affect was calm, cooperative, and appropriate.     Communication:  Normal; communicates verbally, normal voice quality.   Respiratory: Breathing comfortably without stridor or exertion of accessory muscles.    Head/Face:  Normocephalic and atraumatic.  No lesions or scars. No sinus tenderness.    Salivary  glands -  Normal size, no tenderness, swelling, or palpable masses   Eyes: Pupils were equal and reactive.  Extraocular movement intact.         Nose: Sinuses were non-tender.  Anterior rhinoscopy revealed midline septum and absence of purulence or polyps.     Oral Cavity: Normal tongue, floor of mouth, buccal mucosa, and palate.  No lesions or masses on inspection or palpation.     Oropharynx: Normal mucosa, palate symmetric with normal elevation. No abnormal lymph tissue in the oropharynx.             Neck: Supple with normal laryngeal and tracheal landmarks.  The parotid beds were without masses.  No palpable thyroid.  Normal range of motion   Lymphatic: There is no palpable lymphadenopathy in the neck.          Nasal endoscopy: Consent for nasal endoscopy was obtained.  I confirmed correctness of the procedure and identity of the patient.  Nasal endoscopy was indicated due to left sinonasal adenocarcinoma.  The nose was topically decongested and anesthetized.  The nasal endoscope was passed under endoscopic vision.  On the left side the septum is deviated to the right.  There is a large sinonasal cavity.  There is abundant crust that was removed today.  After removing the crusting the sinonasal passages are well mucosalized I do not see any evidence of masses or concerning lesions.  The nasopharynx is normal.  On the right side the septum is deviated to the right limiting access.  I do not see any crusting on the right side.       IMPRESSION AND PLAN: 54-year-old female with left sinonasal adenocarcinoma.  Today's examination did not reveal any evidence of local regional disease.  Plan is to see her back in May with another MRI of the sinuses.  We are going to continue the Ponaris we are going to add sinus rinse with xylitol      Lorenzo Hamilton MD, M.S.  Otolaryngology- Head & Neck Surgery  [phone]

## 2024-03-07 NOTE — DISCHARGE INSTRUCTIONS

## 2024-03-08 ENCOUNTER — OFFICE VISIT (OUTPATIENT)
Dept: RADIATION ONCOLOGY | Facility: CLINIC | Age: 54
End: 2024-03-08
Payer: COMMERCIAL

## 2024-03-08 VITALS
DIASTOLIC BLOOD PRESSURE: 78 MMHG | OXYGEN SATURATION: 98 % | TEMPERATURE: 97.9 F | WEIGHT: 141.7 LBS | BODY MASS INDEX: 21.55 KG/M2 | RESPIRATION RATE: 16 BRPM | HEART RATE: 64 BPM | SYSTOLIC BLOOD PRESSURE: 122 MMHG

## 2024-03-08 DIAGNOSIS — C80.1 ADENOCARCINOMA (H): ICD-10-CM

## 2024-03-08 DIAGNOSIS — C30.0 PRIMARY ADENOCARCINOMA OF NASAL CAVITY (H): Primary | ICD-10-CM

## 2024-03-08 PROCEDURE — 99215 OFFICE O/P EST HI 40 MIN: CPT | Performed by: SURGERY

## 2024-03-08 ASSESSMENT — PAIN SCALES - GENERAL: PAINLEVEL: NO PAIN (0)

## 2024-03-08 NOTE — LETTER
3/8/2024         RE: Rosalind Murphy  [address] [facility] [address]        Dear Colleague,    Thank you for referring your patient, Rosalind Murphy, to the [facility] RADIATION ONCOLOGY [place]. Please see a copy of my visit note below.         Department of Radiation Oncology  [facility]: Cancer Center  [facility]  [address] [address] [place] [id number] [phone]       Radiation Oncology Follow-up Visit  Mar 8, 2024      Rosalind Murphy  MRN: [id number]   : 1970     DIAGNOSIS / ID: Ms. 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 Recommendation is for 60Gy/30fx without chemotherapy. We did discussed the role of elective david irradiation, particularly given location of tumor and stage     INTENT OF RADIOTHERAPY: Adjuvant     CONCURRENT SYSTEMIC THERAPY: No              SITE OF TREATMENT: Sinonasal, elective lymph nodes     DATES  OF TREATMENT: 3/23/2023 to 5/3/2023    TOTAL DOSE OF TREATMENT / FRACTIONS: 60 Gy/30 fractions    INTERVAL SINCE COMPLETION OF RADIATION THERAPY: 6 months    SUBJECTIVE:     She has recently had a PET CT scan and MRI on 2023.  MRI did not demonstrate any evidence of local regional disease or any sign of recurrence.  PET CT scan also noticed posttreatment changes without any obvious sign of a recurrence.  There is evidence of subcentimeter lymph nodes which have been relatively stable and with low SUV uptake, favored to be reactive. There was a new 1.3 groundglass opacity in the left lower lobe, favored to be inflammatory.  Otherwise there is no evidence of any distant metastatic disease.    Recent MRI obtained on  11/2/2023 demonstrates no signs of any local regional recurrence.  CT scan of the chest at that time demonstrated some 2 mm endobronchial nodules.  Repeat scan of the chest obtained on 3/7/2024 was negative for any distant metastatic disease and also demonstrated resolution of nodules.    She has been following with Dr. Lou for cancer surveillance.  Recent rigid scope did not demonstrate any sign of recurrence.    Today, she states that she still has complete loss of smell and taste, but is able to maintain her weight.  She does have a dry mouth and uses XyliMelts at the nights as well as lozenges and hydration throughout the day, and she is now learned to manage this.  Her hair posteriorly has returned.  She does have intermittent headaches but does not state this is new.  She denies any focal neurologic changes.  She denies any lymphadenopathy, dysphagia, trismus, pain.    PHYSICAL EXAM:  /78 (BP Location: Left arm, Patient Position: Chair, Cuff Size: Adult Regular)   Pulse 64   Temp 97.9  F (36.6  C) (Oral)   Resp 16   Wt 64.3 kg (141 lb 11.2 oz)   SpO2 98%   BMI 21.55 kg/m    Gen: Alert, in NAD  Eyes: PERRL, EOMI, sclera anicteric  HENT     Head: NC/AT     Ears: No external auricular lesions     Nose/sinus: No rhinorrhea or epistaxis     Oral Cavity/Oropharynx: MMM  Neck: Supple, full ROM, no LAD  Pulm: No wheezing, stridor or respiratory distress  CV: Well-perfused, no cyanosis, no pedal edema  Abdominal: Soft, nontender, nondistended, no hepatomegaly  Back: No step-offs or pain to palpation along the thoracolumbar spine, no CVA tenderness  Rectal/: Deferred  Musculoskeletal: Normal bulk and tone   Skin: Normal color and turgor  Neurologic: A/Ox3, CN II-XII intact  Psychiatric: Appropriate mood and affect    LABS AND IMAGING:  per HPI reviewed and in agreement.    I have reviewed  notes     IMPRESSION: Overall, patient is doing well from a radiation acute toxicity perspective.Recent  Imaging does not demonstrate any local, regional, or distance recurrence.     PLAN:   1.  Follow-up with Dr. Tomlin every 2-3 months    2. Follow up with radiation oncology in 6 months      Jules Pereira M.D.  Department of Radiation Oncology  [facility]     A total of 40 minutes were spent on this visit, including preparation for this visit, face to face with patient, and medical decision making. Medical decision-making included consideration and possible diagnosis, management options, complex record review, review of diagnostic tests, consideration and discussion of significant complications based up medical history/comorbidities, and discussion with providers involved in care of the patient.       Again, thank you for allowing me to participate in the care of your patient.        Sincerely,        Jules Pereira MD

## 2024-03-08 NOTE — PATIENT INSTRUCTIONS
Please contact [facility] RN with questions or concerns following today's appointment: [phone].       Please feel free to leave a detailed message if your call is not answered.    If your call is not received before 3:00 PM, it may not be returned until the following business day.    If you are receiving radiation treatment and need assistance after 3:00 PM or on the weekends, please call [phone] and ask to speak to the radiation oncologist on-call.    Thank you!    Darline BRANCH

## 2024-03-08 NOTE — PROGRESS NOTES
Department of Radiation Oncology  [facility]: Cancer Center  [facility]  [address] [place] [id number] [phone]       Radiation Oncology Follow-up Visit  Mar 8, 2024      Rosalind Murphy  MRN: [id number]   : 1970     DIAGNOSIS / ID: Ms. 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 Recommendation is for 60Gy/30fx without chemotherapy. We did discussed the role of elective david irradiation, particularly given location of tumor and stage     INTENT OF RADIOTHERAPY: Adjuvant     CONCURRENT SYSTEMIC THERAPY: No              SITE OF TREATMENT: Sinonasal, elective lymph nodes     DATES  OF TREATMENT: 3/23/2023 to 5/3/2023    TOTAL DOSE OF TREATMENT / FRACTIONS: 60 Gy/30 fractions    INTERVAL SINCE COMPLETION OF RADIATION THERAPY: 6 months    SUBJECTIVE:     She has recently had a PET CT scan and MRI on 2023.  MRI did not demonstrate any evidence of local regional disease or any sign of recurrence.  PET CT scan also noticed posttreatment changes without any obvious sign of a recurrence.  There is evidence of subcentimeter lymph nodes which have been relatively stable and with low SUV uptake, favored to be reactive. There was a new 1.3 groundglass opacity in the left lower lobe, favored to be inflammatory.  Otherwise there is no evidence of any distant metastatic disease.    Recent MRI obtained on 2023 demonstrates no signs of any local regional recurrence.  CT scan of the chest at that time demonstrated some 2 mm endobronchial nodules.  Repeat scan of the chest obtained on 3/7/2024 was negative for any distant metastatic disease and also  demonstrated resolution of nodules.    She has been following with Dr. Lou for cancer surveillance.  Recent rigid scope did not demonstrate any sign of recurrence.    Today, she states that she still has complete loss of smell and taste, but is able to maintain her weight.  She does have a dry mouth and uses XyliMelts at the nights as well as lozenges and hydration throughout the day, and she is now learned to manage this.  Her hair posteriorly has returned.  She does have intermittent headaches but does not state this is new.  She denies any focal neurologic changes.  She denies any lymphadenopathy, dysphagia, trismus, pain.    PHYSICAL EXAM:  /78 (BP Location: Left arm, Patient Position: Chair, Cuff Size: Adult Regular)   Pulse 64   Temp 97.9  F (36.6  C) (Oral)   Resp 16   Wt 64.3 kg (141 lb 11.2 oz)   SpO2 98%   BMI 21.55 kg/m    Gen: Alert, in NAD  Eyes: PERRL, EOMI, sclera anicteric  HENT     Head: NC/AT     Ears: No external auricular lesions     Nose/sinus: No rhinorrhea or epistaxis     Oral Cavity/Oropharynx: MMM  Neck: Supple, full ROM, no LAD  Pulm: No wheezing, stridor or respiratory distress  CV: Well-perfused, no cyanosis, no pedal edema  Abdominal: Soft, nontender, nondistended, no hepatomegaly  Back: No step-offs or pain to palpation along the thoracolumbar spine, no CVA tenderness  Rectal/: Deferred  Musculoskeletal: Normal bulk and tone   Skin: Normal color and turgor  Neurologic: A/Ox3, CN II-XII intact  Psychiatric: Appropriate mood and affect    LABS AND IMAGING:  per HPI reviewed and in agreement.    I have reviewed  notes     IMPRESSION: Overall, patient is doing well from a radiation acute toxicity perspective.Recent Imaging does not demonstrate any local, regional, or distance recurrence.     PLAN:   1.  Follow-up with Dr. Tomlin every 2-3 months    2. Follow up with radiation oncology in 6 months      Jules Pereira M.D.  Department of Radiation  Oncology  [facility]     A total of 40 minutes were spent on this visit, including preparation for this visit, face to face with patient, and medical decision making. Medical decision-making included consideration and possible diagnosis, management options, complex record review, review of diagnostic tests, consideration and discussion of significant complications based up medical history/comorbidities, and discussion with providers involved in care of the patient.

## 2024-03-08 NOTE — NURSING NOTE
"Oncology Rooming Note    March 8, 2024 12:09 PM   Rosalind Murphy is a 54 year old female who presents for:    Chief Complaint   Patient presents with    Cancer     Radiation oncology return visit with Dr. Pereira     Initial Vitals: /78 (BP Location: Left arm, Patient Position: Chair, Cuff Size: Adult Regular)   Pulse 64   Temp 97.9  F (36.6  C) (Oral)   Resp 16   Wt 64.3 kg (141 lb 11.2 oz)   SpO2 98%   BMI 21.55 kg/m   Estimated body mass index is 21.55 kg/m  as calculated from the following:    Height as of 3/7/24: 1.727 m (5' 8\").    Weight as of this encounter: 64.3 kg (141 lb 11.2 oz). Body surface area is 1.76 meters squared.  No Pain (0) Comment: Data Unavailable   No LMP recorded. Patient has had a hysterectomy.  Allergies reviewed: Yes  Medications reviewed: Yes    Medications: Medication refills not needed today.  Pharmacy name entered into [employer]:    [id number] [place] [address]  [email or web address] [place], [place] - [address] [place] [address]    Frailty Screening:   Is the patient here for a new oncology consult visit in cancer care? 2. No    Radiation History:  Site: sinonasal  Total Dose: 6,000 cGy  Date Completed: 5/3/2023  Clinic: [facility]  Physician: Dr. Jules Pereira    Clinical concerns: patient reports loss of taste and smell, occasional can taste sweet, however, relies on texture and not taste.  Weight stable since November, 2023.  Patient reports intermittent tenderness of nose and intermittent frontal headaches, denies need for pain management.  Patient reports dry mouth and throat and reports using Xylitol tablets at night, dry mouth mouthwash during day and dry mouth lozenges during the day as needed.  Dr. Jules Pereira was notified.    Darline Villa RN BSN OCN CBCN                "

## 2024-05-01 NOTE — PROGRESS NOTES
Procedure Date: 02/02/2023     PREOPERATIVE DIAGNOSES:    1.  Intestinal type sinonasal adenocarcinoma.  2.  Nasal obstruction.     PREOPERATIVE DIAGNOSES:    1.  Intestinal type sinonasal adenocarcinoma.  2.  Nasal obstruction.     PROCEDURE PERFORMED:     1.  Endoscopic endonasal transcribriform resection of anterior cranial fossa intradural tumor, left sinonasal adenocarcinoma.  2.  [place] of right-sided pedicled nasoseptal flap pedicled off the posterior septal branch of sphenopalatine artery.     SURGEON:  Germain Vyas MD     CO-SURGEON:  Abhi Tidwell MD     Completed radiation : 5/3/2023 in [place]      History of Present Illness: 54-year-old female here in the otolaryngology clinic complaining of sinonasal congestion and mild orders coming from her nose.  Patient denies any fever chills or any other constitutional symptoms.  Patient has been very religiously doing her sinonasal rinses.  She is also using Ponaris.  She has a scheduled MRI on May 9.    MEDICATIONS:     Current Outpatient Medications   Medication Sig Dispense Refill    acetaminophen (TYLENOL) 325 MG tablet Take 325-650 mg by mouth every 6 hours as needed for mild pain      Ascorbic Acid (VITAMIN C) 500 MG CAPS Take 1 capsule by mouth daily      COMPOUNDED NON-CONTROLLED SUBSTANCE (CMPD RX) - PHARMACY TO MIX COMPOUNDED MEDICATION Open Tobramycin capsule and empty contents into 240 ml of nasal saline mixture. Rinse each nasal cavity with 120 ml of mixture twice daily. (Patient not taking: Reported on 3/8/2024) 60 capsule 1    diclofenac (VOLTAREN) 1 % topical gel Apply 2 g topically 2 times daily as needed      ibuprofen (ADVIL/MOTRIN) 200 MG tablet Take 200 mg by mouth every 4 hours as needed for pain      levocetirizine (XYZAL) 5 MG tablet Take by mouth every evening      levothyroxine (SYNTHROID/LEVOTHROID) 75 MCG tablet Take 75 mcg by mouth daily      Misc Natural Product Nasal (PONARIS) SOLN Use 2-4 drops in each nostril before  bedtime. 30 mL 11    Misc Natural Product Nasal (PONARIS) SOLN Spray 2 drops in nostril 3 times daily 30 mL 3    ondansetron (ZOFRAN ODT) 4 MG ODT tab Take 1 tablet (4 mg) by mouth every 8 hours as needed for nausea (Patient not taking: Reported on 3/8/2024) 10 tablet 0    propranolol (INDERAL) 20 MG tablet Take 20 mg by mouth 2 times daily      rizatriptan (MAXALT) 10 MG tablet       spironolactone (ALDACTONE) 25 MG tablet Take 25 mg by mouth every morning         ALLERGIES:    Allergies   Allergen Reactions    Doxycycline Headache and Nausea     And Headaches      Hydroxychloroquine Headache and Tinnitus    Topiramate Tinnitus     Worsening Tinnitus      Scopolamine Other (See Comments)     Metal taste in mouth for days         PAST MEDICAL HISTORY:   Past Medical History:   Diagnosis Date    History of hysterectomy 03/06/2015    Hypothyroidism     Motion sickness     PONV (postoperative nausea and vomiting)     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              Fiberoptic Endoscopy with sinonasal debridement: Consent for nasal endoscopy and debridement was obtained.  I confirmed correctness of the procedure and identity of the patient.  Nasal endoscopy and debridement was indicated due to sinonasal crusting and history of sinonasal malignancy.  The nose was topically decongested and anesthetized.  The nasal endoscope was passed under endoscopic vision.  The septum is in the midline there is abundant amount of sinonasal crust that was  removed today.  I did obtain a culture from some yellowish-greenish secretion on the sinonasal mucosa.  The are no concerning masses or lesions on today's nasal endoscopy.        IMPRESSION AND PLAN: 54-year-old female with a diagnosis of intestinal type adenocarcinoma of the sinonasal cavity.  Presenting today with abundant sinonasal crust.  This was removed today endoscopically.  I am recommending her to resume the antibiotic sinonasal rinses.  I will call her with the results of the cultures.  She is going to have his sinus MRI May 9 and I will chart check that MRI.  And the plan is to see her back in 2 months or earlier if needed.        Lorenzo Hamilton MD, M.S.  Otolaryngology- Head & Neck Surgery  [phone]

## 2024-05-02 ENCOUNTER — TELEPHONE (OUTPATIENT)
Dept: OTOLARYNGOLOGY | Facility: CLINIC | Age: 54
End: 2024-05-02

## 2024-05-02 ENCOUNTER — OFFICE VISIT (OUTPATIENT)
Dept: OTOLARYNGOLOGY | Facility: CLINIC | Age: 54
End: 2024-05-02
Payer: COMMERCIAL

## 2024-05-02 VITALS
HEART RATE: 64 BPM | HEIGHT: 68 IN | DIASTOLIC BLOOD PRESSURE: 71 MMHG | SYSTOLIC BLOOD PRESSURE: 105 MMHG | BODY MASS INDEX: 21.98 KG/M2 | WEIGHT: 145 LBS

## 2024-05-02 DIAGNOSIS — J01.01 ACUTE RECURRENT MAXILLARY SINUSITIS: Primary | ICD-10-CM

## 2024-05-02 PROCEDURE — 87102 FUNGUS ISOLATION CULTURE: CPT | Performed by: OTOLARYNGOLOGY

## 2024-05-02 PROCEDURE — 99000 SPECIMEN HANDLING OFFICE-LAB: CPT | Performed by: PATHOLOGY

## 2024-05-02 PROCEDURE — 87205 SMEAR GRAM STAIN: CPT | Performed by: OTOLARYNGOLOGY

## 2024-05-02 PROCEDURE — 31237 NSL/SINS NDSC SURG BX POLYPC: CPT | Performed by: OTOLARYNGOLOGY

## 2024-05-02 PROCEDURE — 99214 OFFICE O/P EST MOD 30 MIN: CPT | Mod: 25 | Performed by: OTOLARYNGOLOGY

## 2024-05-02 ASSESSMENT — PAIN SCALES - GENERAL: PAINLEVEL: NO PAIN (0)

## 2024-05-02 NOTE — NURSING NOTE
"Chief Complaint   Patient presents with    RECHECK   Blood pressure 105/71, pulse 64, height 1.727 m (5' 8\"), weight 65.8 kg (145 lb). Vickey Rubi, EMT    "

## 2024-05-02 NOTE — PATIENT INSTRUCTIONS
You were seen in the ENT Clinic today by Dr. Hamilton. If you have any questions or concerns after your appointment, please contact us (see below)    The following has been recommended for you based upon your appointment today:  We will call you with the results of your culture from today   We will call you with the results of your MRI from 5/9/24    Please return to clinic in 2 months for follow up with Dr. Lou     How to Contact Us:  Send a GELI message to your provider. Our team will respond to you via GELI. Occasionally, we will need to call you to get further information.  For urgent matters (Monday-Friday), call the ENT Clinic: [phone] and speak with a call center team member - they will route your call appropriately.   If you'd like to speak directly with a nurse, please find our contact information below. We do our best to check voicemail frequently throughout the day, and will work to call you back within 1-2 days. For urgent matters, please use the general clinic phone numbers listed above.    Codi MORALES RN, BSN  RN Care Coordinator, ENT Clinic  [facility]  Direct: [phone]

## 2024-05-02 NOTE — TELEPHONE ENCOUNTER
Spoke with patient regarding scheduling a 2 month Return Visit in  clinic. Scheduled patient accordingly and patient will see appointment in MyChart-Per Patient

## 2024-05-02 NOTE — LETTER
5/2/2024       RE: Rosalind Murphy  [address] [address] [address]     Dear Colleague,    Thank you for referring your patient, Rosalind Murphy, to the [facility] EAR NOSE AND THROAT CLINIC [place] at [facility]. Please see a copy of my visit note below.    Procedure Date: 02/02/2023     PREOPERATIVE DIAGNOSES:    1.  Intestinal type sinonasal adenocarcinoma.  2.  Nasal obstruction.     PREOPERATIVE DIAGNOSES:    1.  Intestinal type sinonasal adenocarcinoma.  2.  Nasal obstruction.     PROCEDURE PERFORMED:     1.  Endoscopic endonasal transcribriform resection of anterior cranial fossa intradural tumor, left sinonasal adenocarcinoma.  2.  [place] of right-sided pedicled nasoseptal flap pedicled off the posterior septal branch of sphenopalatine artery.     SURGEON:  Germain Vyas MD     CO-SURGEON:  Abhi Tidwell MD     Completed radiation : 5/3/2023 in [place]      History of Present Illness: 54-year-old female here in the otolaryngology clinic complaining of sinonasal congestion and mild orders coming from her nose.  Patient denies any fever chills or any other constitutional symptoms.  Patient has been very religiously doing her sinonasal rinses.  She is also using Ponaris.  She has a scheduled MRI on May 9.    MEDICATIONS:     Current Outpatient Medications   Medication Sig Dispense Refill     acetaminophen (TYLENOL) 325 MG tablet Take 325-650 mg by mouth every 6 hours as needed for mild pain       Ascorbic Acid (VITAMIN C) 500 MG CAPS Take 1 capsule by mouth daily       COMPOUNDED NON-CONTROLLED SUBSTANCE (CMPD RX) - PHARMACY TO MIX COMPOUNDED MEDICATION Open Tobramycin capsule and empty contents into 240 ml of nasal saline mixture. Rinse each nasal cavity with 120 ml of mixture twice daily. (Patient not taking: Reported on 3/8/2024) 60 capsule 1     diclofenac (VOLTAREN) 1 % topical gel Apply 2 g topically 2 times daily as needed        ibuprofen (ADVIL/MOTRIN) 200 MG tablet Take 200 mg by mouth every 4 hours as needed for pain       levocetirizine (XYZAL) 5 MG tablet Take by mouth every evening       levothyroxine (SYNTHROID/LEVOTHROID) 75 MCG tablet Take 75 mcg by mouth daily       Misc Natural Product Nasal (PONARIS) SOLN Use 2-4 drops in each nostril before bedtime. 30 mL 11     Misc Natural Product Nasal (PONARIS) SOLN Spray 2 drops in nostril 3 times daily 30 mL 3     ondansetron (ZOFRAN ODT) 4 MG ODT tab Take 1 tablet (4 mg) by mouth every 8 hours as needed for nausea (Patient not taking: Reported on 3/8/2024) 10 tablet 0     propranolol (INDERAL) 20 MG tablet Take 20 mg by mouth 2 times daily       rizatriptan (MAXALT) 10 MG tablet        spironolactone (ALDACTONE) 25 MG tablet Take 25 mg by mouth every morning         ALLERGIES:    Allergies   Allergen Reactions     Doxycycline Headache and Nausea     And Headaches       Hydroxychloroquine Headache and Tinnitus     Topiramate Tinnitus     Worsening Tinnitus       Scopolamine Other (See Comments)     Metal taste in mouth for days         PAST MEDICAL HISTORY:   Past Medical History:   Diagnosis Date     History of hysterectomy 03/06/2015     Hypothyroidism      Motion sickness      PONV (postoperative nausea and vomiting)      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              Fiberoptic Endoscopy with sinonasal debridement: Consent for nasal endoscopy and debridement was obtained.  I confirmed correctness of  the procedure and identity of the patient.  Nasal endoscopy and debridement was indicated due to sinonasal crusting and history of sinonasal malignancy.  The nose was topically decongested and anesthetized.  The nasal endoscope was passed under endoscopic vision.  The septum is in the midline there is abundant amount of sinonasal crust that was removed today.  I did obtain a culture from some yellowish-greenish secretion on the sinonasal mucosa.  The are no concerning masses or lesions on today's nasal endoscopy.        IMPRESSION AND PLAN: 54-year-old female with a diagnosis of intestinal type adenocarcinoma of the sinonasal cavity.  Presenting today with abundant sinonasal crust.  This was removed today endoscopically.  I am recommending her to resume the antibiotic sinonasal rinses.  I will call her with the results of the cultures.  She is going to have his sinus MRI May 9 and I will chart check that MRI.  And the plan is to see her back in 2 months or earlier if needed.        Lorenzo Hamilton MD, M.S.  Otolaryngology- Head & Neck Surgery  [phone]

## 2024-05-04 LAB
BACTERIA WND CULT: ABNORMAL
BACTERIA WND CULT: ABNORMAL
GRAM STAIN RESULT: ABNORMAL
GRAM STAIN RESULT: ABNORMAL

## 2024-05-06 ENCOUNTER — TELEPHONE (OUTPATIENT)
Dept: OTOLARYNGOLOGY | Facility: CLINIC | Age: 54
End: 2024-05-06
Payer: COMMERCIAL

## 2024-05-06 DIAGNOSIS — J01.01 ACUTE RECURRENT MAXILLARY SINUSITIS: Primary | ICD-10-CM

## 2024-05-06 RX ORDER — SULFAMETHOXAZOLE/TRIMETHOPRIM 800-160 MG
1 TABLET ORAL 2 TIMES DAILY
Qty: 28 TABLET | Refills: 0 | Status: SHIPPED | OUTPATIENT
Start: 2024-05-06 | End: 2024-05-20

## 2024-05-06 NOTE — TELEPHONE ENCOUNTER
Called patient to discuss culture results. Call went to voicemail. I left a voice mail letting her know the results of the culture and that will send prescription to her pharmacy

## 2024-05-09 ENCOUNTER — ANCILLARY PROCEDURE (OUTPATIENT)
Dept: MRI IMAGING | Facility: CLINIC | Age: 54
End: 2024-05-09
Attending: OTOLARYNGOLOGY
Payer: COMMERCIAL

## 2024-05-09 PROCEDURE — A9585 GADOBUTROL INJECTION: HCPCS | Performed by: RADIOLOGY

## 2024-05-09 PROCEDURE — 70543 MRI ORBT/FAC/NCK W/O &W/DYE: CPT | Mod: GC | Performed by: RADIOLOGY

## 2024-05-09 RX ORDER — GADOBUTROL 604.72 MG/ML
7.5 INJECTION INTRAVENOUS ONCE
Status: COMPLETED | OUTPATIENT
Start: 2024-05-09 | End: 2024-05-09

## 2024-05-09 RX ADMIN — GADOBUTROL 7 ML: 604.72 INJECTION INTRAVENOUS at 13:42

## 2024-05-16 LAB — BACTERIA WND CULT: NO GROWTH

## 2024-06-29 ENCOUNTER — HEALTH MAINTENANCE LETTER (OUTPATIENT)
Age: 54
End: 2024-06-29

## 2024-07-10 NOTE — PROGRESS NOTES
Procedure Date: 02/02/2023     PREOPERATIVE DIAGNOSES:    1.  Intestinal type sinonasal adenocarcinoma.  2.  Nasal obstruction.     PREOPERATIVE DIAGNOSES:    1.  Intestinal type sinonasal adenocarcinoma.  2.  Nasal obstruction.     PROCEDURE PERFORMED:     1.  Endoscopic endonasal transcribriform resection of anterior cranial fossa intradural tumor, left sinonasal adenocarcinoma.  2.  [place] of right-sided pedicled nasoseptal flap pedicled off the posterior septal branch of sphenopalatine artery.     SURGEON:  Germain Vyas MD     CO-SURGEON:  Abhi Tidwell MD     Completed radiation : 5/3/2023 in [place]      History of Present Illness: 54-year-old female here in the otolaryngology clinic for follow-up.  Patient completed antibiotic treatment with Bactrim as well as antibiotic nasal rinses.  She noted a lot of improvement.  She is states that now for the last week she has been noticing more nasal congestion.  She also states that the amount of odorous coming from her nose has disappeared.    MEDICATIONS:     Current Outpatient Medications   Medication Sig Dispense Refill    acetaminophen (TYLENOL) 325 MG tablet Take 325-650 mg by mouth every 6 hours as needed for mild pain      Ascorbic Acid (VITAMIN C) 500 MG CAPS Take 1 capsule by mouth daily      COMPOUNDED NON-CONTROLLED SUBSTANCE (CMPD RX) - PHARMACY TO MIX COMPOUNDED MEDICATION Open Tobramycin capsule and empty contents into 240 ml of nasal saline mixture. Rinse each nasal cavity with 120 ml of mixture twice daily. (Patient not taking: Reported on 3/8/2024) 60 capsule 1    diclofenac (VOLTAREN) 1 % topical gel Apply 2 g topically 2 times daily as needed      ibuprofen (ADVIL/MOTRIN) 200 MG tablet Take 200 mg by mouth every 4 hours as needed for pain      levocetirizine (XYZAL) 5 MG tablet Take by mouth every evening      levothyroxine (SYNTHROID/LEVOTHROID) 75 MCG tablet Take 75 mcg by mouth daily      Misc Natural Product Nasal (PONARIS) SOLN Use  2-4 drops in each nostril before bedtime. 30 mL 11    Misc Natural Product Nasal (PONARIS) SOLN Spray 2 drops in nostril 3 times daily 30 mL 3    ondansetron (ZOFRAN ODT) 4 MG ODT tab Take 1 tablet (4 mg) by mouth every 8 hours as needed for nausea (Patient not taking: Reported on 3/8/2024) 10 tablet 0    propranolol (INDERAL) 20 MG tablet Take 20 mg by mouth 2 times daily      rizatriptan (MAXALT) 10 MG tablet       spironolactone (ALDACTONE) 25 MG tablet Take 25 mg by mouth every morning         ALLERGIES:    Allergies   Allergen Reactions    Doxycycline Headache and Nausea     And Headaches      Hydroxychloroquine Headache and Tinnitus    Topiramate Tinnitus     Worsening Tinnitus      Scopolamine Other (See Comments)     Metal taste in mouth for days       PAST MEDICAL HISTORY:   Past Medical History:   Diagnosis Date    History of hysterectomy 03/06/2015    Hypothyroidism     Motion sickness     PONV (postoperative nausea and vomiting)     Primary adenocarcinoma of nasal cavity (H) 12/23/2022    S/P radiation therapy     6,000 cGy to sinonasal completed on 5/3/2023 [facility]        FAMILY HISTORY:    Family History   Problem Relation Age of Onset    Breast Cancer Mother     Breast Cancer Maternal Aunt     Breast Cancer Maternal Aunt     Lung Cancer Maternal Aunt     Bladder Cancer Maternal Aunt     Cancer Maternal Uncle         Eye       REVIEW OF SYSTEMS:  12 point ROS was negative other than the symptoms noted above in the HPI.      Nasal endoscopy: Consent for nasal endoscopy was obtained.  I confirmed correctness of the procedure and identity of the patient.  Nasal endoscopy was indicated due to sinonasal malignancy.  The nose was topically decongested and anesthetized.  The nasal endoscope was passed under endoscopic vision.  On the left side the septum is deviated to the right a posterior septectomy with some crusting.  This was removed today using suction and nasal forceps.  The  amount of crusting today has decreased substantially.  The sinonasal passages are dry in general.  On the right side the septum is deviated to the right and is synechiaed to the middle turbinate.      IMPRESSION AND PLAN: 54-year-old female with diagnosis of intestinal type adenocarcinoma of the sinonasal passages.  She is a status post surgery followed by radiation therapy.  Completed over a year ago.  Continues to struggle with sinonasal crusting.  Last sinonasal cultures show staphylococcal aureus.  I prescribed Bactrim the for 14 days.  Patient did a felt a lot of improvement.  At this time we are going to increase the Ponaris oil to every night and decrease the sinonasal rinses to twice a week.  I would like to see her back in 3 months for another follow-up or earlier if needed       Lorenzo Hamilton MD, M.S.  Otolaryngology- Head & Neck Surgery  [phone]

## 2024-07-11 ENCOUNTER — OFFICE VISIT (OUTPATIENT)
Dept: OTOLARYNGOLOGY | Facility: CLINIC | Age: 54
End: 2024-07-11
Attending: OTOLARYNGOLOGY
Payer: COMMERCIAL

## 2024-07-11 VITALS
WEIGHT: 147.9 LBS | OXYGEN SATURATION: 100 % | DIASTOLIC BLOOD PRESSURE: 82 MMHG | SYSTOLIC BLOOD PRESSURE: 117 MMHG | HEIGHT: 68 IN | BODY MASS INDEX: 22.42 KG/M2 | HEART RATE: 74 BPM

## 2024-07-11 DIAGNOSIS — C30.0 PRIMARY ADENOCARCINOMA OF NASAL CAVITY (H): Primary | ICD-10-CM

## 2024-07-11 PROCEDURE — 99214 OFFICE O/P EST MOD 30 MIN: CPT | Mod: 25 | Performed by: OTOLARYNGOLOGY

## 2024-07-11 PROCEDURE — 31237 NSL/SINS NDSC SURG BX POLYPC: CPT | Mod: LT | Performed by: OTOLARYNGOLOGY

## 2024-07-11 ASSESSMENT — PAIN SCALES - GENERAL: PAINLEVEL: MILD PAIN (2)

## 2024-07-11 NOTE — PATIENT INSTRUCTIONS
You were seen in the ENT Clinic today by Dr. Hamilton. If you have any questions or concerns after your appointment, please contact us (see below)    Please return to clinic in 3 months for follow up with Dr. Lou     How to Contact Us:  Send a Wealthsimple message to your provider. Our team will respond to you via Wealthsimple. Occasionally, we will need to call you to get further information.  For urgent matters (Monday-Friday), call the ENT Clinic: [phone] and speak with a call center team member - they will route your call appropriately.   If you'd like to speak directly with a nurse, please find our contact information below. We do our best to check voicemail frequently throughout the day, and will work to call you back within 1-2 days. For urgent matters, please use the general clinic phone numbers listed above.    Codi MORALES RN, BSN  RN Care Coordinator, ENT Clinic  [facility]  Direct: [phone]

## 2024-07-11 NOTE — LETTER
7/11/2024       RE: Rosalind Murphy  [address] [address] [address]     Dear Colleague,    Thank you for referring your patient, Rosalind Murphy, to the [facility] EAR NOSE AND THROAT CLINIC [place] at [facility]. Please see a copy of my visit note below.    Procedure Date: 02/02/2023     PREOPERATIVE DIAGNOSES:    1.  Intestinal type sinonasal adenocarcinoma.  2.  Nasal obstruction.     PREOPERATIVE DIAGNOSES:    1.  Intestinal type sinonasal adenocarcinoma.  2.  Nasal obstruction.     PROCEDURE PERFORMED:     1.  Endoscopic endonasal transcribriform resection of anterior cranial fossa intradural tumor, left sinonasal adenocarcinoma.  2.  [place] of right-sided pedicled nasoseptal flap pedicled off the posterior septal branch of sphenopalatine artery.     SURGEON:  Germain Vyas MD     CO-SURGEON:  Abhi Tidwell MD     Completed radiation : 5/3/2023 in [place]      History of Present Illness: 54-year-old female here in the otolaryngology clinic for follow-up.  Patient completed antibiotic treatment with Bactrim as well as antibiotic nasal rinses.  She noted a lot of improvement.  She is states that now for the last week she has been noticing more nasal congestion.  She also states that the amount of odorous coming from her nose has disappeared.    MEDICATIONS:     Current Outpatient Medications   Medication Sig Dispense Refill    acetaminophen (TYLENOL) 325 MG tablet Take 325-650 mg by mouth every 6 hours as needed for mild pain      Ascorbic Acid (VITAMIN C) 500 MG CAPS Take 1 capsule by mouth daily      COMPOUNDED NON-CONTROLLED SUBSTANCE (CMPD RX) - PHARMACY TO MIX COMPOUNDED MEDICATION Open Tobramycin capsule and empty contents into 240 ml of nasal saline mixture. Rinse each nasal cavity with 120 ml of mixture twice daily. (Patient not taking: Reported on 3/8/2024) 60 capsule 1    diclofenac (VOLTAREN) 1 % topical gel Apply 2 g topically 2  times daily as needed      ibuprofen (ADVIL/MOTRIN) 200 MG tablet Take 200 mg by mouth every 4 hours as needed for pain      levocetirizine (XYZAL) 5 MG tablet Take by mouth every evening      levothyroxine (SYNTHROID/LEVOTHROID) 75 MCG tablet Take 75 mcg by mouth daily      Misc Natural Product Nasal (PONARIS) SOLN Use 2-4 drops in each nostril before bedtime. 30 mL 11    Misc Natural Product Nasal (PONARIS) SOLN Spray 2 drops in nostril 3 times daily 30 mL 3    ondansetron (ZOFRAN ODT) 4 MG ODT tab Take 1 tablet (4 mg) by mouth every 8 hours as needed for nausea (Patient not taking: Reported on 3/8/2024) 10 tablet 0    propranolol (INDERAL) 20 MG tablet Take 20 mg by mouth 2 times daily      rizatriptan (MAXALT) 10 MG tablet       spironolactone (ALDACTONE) 25 MG tablet Take 25 mg by mouth every morning         ALLERGIES:    Allergies   Allergen Reactions    Doxycycline Headache and Nausea     And Headaches      Hydroxychloroquine Headache and Tinnitus    Topiramate Tinnitus     Worsening Tinnitus      Scopolamine Other (See Comments)     Metal taste in mouth for days       PAST MEDICAL HISTORY:   Past Medical History:   Diagnosis Date    History of hysterectomy 03/06/2015    Hypothyroidism     Motion sickness     PONV (postoperative nausea and vomiting)     Primary adenocarcinoma of nasal cavity (H) 12/23/2022    S/P radiation therapy     6,000 cGy to sinonasal completed on 5/3/2023 [facility]        FAMILY HISTORY:    Family History   Problem Relation Age of Onset    Breast Cancer Mother     Breast Cancer Maternal Aunt     Breast Cancer Maternal Aunt     Lung Cancer Maternal Aunt     Bladder Cancer Maternal Aunt     Cancer Maternal Uncle         Eye       REVIEW OF SYSTEMS:  12 point ROS was negative other than the symptoms noted above in the HPI.      Nasal endoscopy: Consent for nasal endoscopy was obtained.  I confirmed correctness of the procedure and identity of the patient.  Nasal  endoscopy was indicated due to sinonasal malignancy.  The nose was topically decongested and anesthetized.  The nasal endoscope was passed under endoscopic vision.  On the left side the septum is deviated to the right a posterior septectomy with some crusting.  This was removed today using suction and nasal forceps.  The amount of crusting today has decreased substantially.  The sinonasal passages are dry in general.  On the right side the septum is deviated to the right and is synechiaed to the middle turbinate.      IMPRESSION AND PLAN: 54-year-old female with diagnosis of intestinal type adenocarcinoma of the sinonasal passages.  She is a status post surgery followed by radiation therapy.  Completed over a year ago.  Continues to struggle with sinonasal crusting.  Last sinonasal cultures show staphylococcal aureus.  I prescribed Bactrim the for 14 days.  Patient did a felt a lot of improvement.  At this time we are going to increase the Ponaris oil to every night and decrease the sinonasal rinses to twice a week.  I would like to see her back in 3 months for another follow-up or earlier if needed       Lorenzo Hamilton MD, M.S.  Otolaryngology- Head & Neck Surgery  [phone]

## 2024-07-12 ENCOUNTER — TELEPHONE (OUTPATIENT)
Dept: OTOLARYNGOLOGY | Facility: CLINIC | Age: 54
End: 2024-07-12
Payer: COMMERCIAL

## 2024-07-12 NOTE — TELEPHONE ENCOUNTER
Patient confirmed scheduled appointment:  Date: 10/17  Time: 4:00pm  Visit type: Return ENT  Provider: Dr. Lou  Location: [facility]  Testing/imaging:   Additional notes:

## 2024-08-03 ENCOUNTER — MYC MEDICAL ADVICE (OUTPATIENT)
Dept: OTOLARYNGOLOGY | Facility: CLINIC | Age: 54
End: 2024-08-03
Payer: COMMERCIAL

## 2024-08-06 NOTE — TELEPHONE ENCOUNTER
Called and spoke to patient regarding MyChart message with symptoms of green/yellow mucous, bad smell, and feeling congested all the time. Patient would like to be seen for follow up appointment. Scheduled for 8/8/24 with Dr. Lou.     Codi Alfredo, RN, BSN  RN Care Coordinator, ENT Clinic

## 2024-08-07 NOTE — PROGRESS NOTES
Procedure Date: 02/02/2023     PREOPERATIVE DIAGNOSES:    1.  Intestinal type sinonasal adenocarcinoma.  2.  Nasal obstruction.     PREOPERATIVE DIAGNOSES:    1.  Intestinal type sinonasal adenocarcinoma.  2.  Nasal obstruction.     PROCEDURE PERFORMED:     1.  Endoscopic endonasal transcribriform resection of anterior cranial fossa intradural tumor, left sinonasal adenocarcinoma.  2.  [place] of right-sided pedicled nasoseptal flap pedicled off the posterior septal branch of sphenopalatine artery.     SURGEON:  Germain Vyas MD     CO-SURGEON:  Abhi Tidwell MD     Completed radiation : 5/3/2023 in [place]      History of Present Illness: 54-year-old female with a diagnosis of intestinal type sinonasal adenocarcinoma status post transnasal endoscopic resection.  She completed radiation therapy on May 2023.  The patient is here for persistent nasal crusting.  She completed her last round of antibiotics back in May of this year with a lot of improvement.  She was treated with Bactrim for 14 days after obtaining sinonasal culture that grew staphylococcal aureus.  As suspected patient is very frustrated with this issue of crusting.    MEDICATIONS:     Current Outpatient Medications   Medication Sig Dispense Refill    acetaminophen (TYLENOL) 325 MG tablet Take 325-650 mg by mouth every 6 hours as needed for mild pain      Ascorbic Acid (VITAMIN C) 500 MG CAPS Take 1 capsule by mouth daily      COMPOUNDED NON-CONTROLLED SUBSTANCE (CMPD RX) - PHARMACY TO MIX COMPOUNDED MEDICATION Open Tobramycin capsule and empty contents into 240 ml of nasal saline mixture. Rinse each nasal cavity with 120 ml of mixture twice daily. (Patient not taking: Reported on 3/8/2024) 60 capsule 1    diclofenac (VOLTAREN) 1 % topical gel Apply 2 g topically 2 times daily as needed      ibuprofen (ADVIL/MOTRIN) 200 MG tablet Take 200 mg by mouth every 4 hours as needed for pain      levocetirizine (XYZAL) 5 MG tablet Take by mouth every  evening      levothyroxine (SYNTHROID/LEVOTHROID) 75 MCG tablet Take 75 mcg by mouth daily      Misc Natural Product Nasal (PONARIS) SOLN Use 2-4 drops in each nostril before bedtime. 30 mL 11    Misc Natural Product Nasal (PONARIS) SOLN Spray 2 drops in nostril 3 times daily 30 mL 3    ondansetron (ZOFRAN ODT) 4 MG ODT tab Take 1 tablet (4 mg) by mouth every 8 hours as needed for nausea (Patient not taking: Reported on 3/8/2024) 10 tablet 0    propranolol (INDERAL) 20 MG tablet Take 20 mg by mouth 2 times daily      rizatriptan (MAXALT) 10 MG tablet       spironolactone (ALDACTONE) 25 MG tablet Take 25 mg by mouth every morning         ALLERGIES:    Allergies   Allergen Reactions    Doxycycline Headache and Nausea     And Headaches      Hydroxychloroquine Headache and Tinnitus    Topiramate Tinnitus     Worsening Tinnitus      Scopolamine Other (See Comments)     Metal taste in mouth for days         PAST MEDICAL HISTORY:   Past Medical History:   Diagnosis Date    History of hysterectomy 03/06/2015    Hypothyroidism     Motion sickness     PONV (postoperative nausea and vomiting)     Primary adenocarcinoma of nasal cavity (H) 12/23/2022    S/P radiation therapy     6,000 cGy to sinonasal completed on 5/3/2023 [facility]        FAMILY HISTORY:    Family History   Problem Relation Age of Onset    Breast Cancer Mother     Breast Cancer Maternal Aunt     Breast Cancer Maternal Aunt     Lung Cancer Maternal Aunt     Bladder Cancer Maternal Aunt     Cancer Maternal Uncle         Eye       REVIEW OF SYSTEMS:  12 point ROS was negative other than the symptoms noted above in the HPI.      Nasal endoscopy and debridement: Consent for nasal endoscopy and debridement was obtained.  I confirmed correctness of the procedure and identity of the patient.  Nasal endoscopy was indicated due to sinonasal grass and history of sinonasal malignancy.  The nose was topically decongested and anesthetized.  The nasal  endoscope was passed under endoscopic vision.  There was abundant sinonasal crust that was removed today with suction, nasal forceps and curettes.  Under the crust there is a thin layer of yellowish-greenish secretion.  I did obtain cultures again today.  The right side is difficult to access because of the very limited space due to fibrosis.  Looking through the left side it appears to be that there is a piece of bone exposed at the septum.  After completing all the debridement I also suction the left maxillary sinus.  This maxillary sinus was full with secretion.         IMPRESSION AND PLAN: So patient continues to have severe sinonasal crusting.  I obtain another sinonasal culture today.  I did not start any antibiotics at this time.  I am placing a consult to infectious disease for some input.  She is going to continue with sinonasal conservative treatments.  Soon as we get the results of the cultures will communicate with the patient and start an antibiotic.        Lorenzo Hamilton MD, M.S.  Otolaryngology- Head & Neck Surgery  [phone]

## 2024-08-08 ENCOUNTER — OFFICE VISIT (OUTPATIENT)
Dept: OTOLARYNGOLOGY | Facility: CLINIC | Age: 54
End: 2024-08-08
Payer: COMMERCIAL

## 2024-08-08 VITALS
DIASTOLIC BLOOD PRESSURE: 73 MMHG | HEART RATE: 68 BPM | OXYGEN SATURATION: 100 % | BODY MASS INDEX: 22.4 KG/M2 | SYSTOLIC BLOOD PRESSURE: 110 MMHG | WEIGHT: 147.8 LBS | HEIGHT: 68 IN

## 2024-08-08 DIAGNOSIS — C30.0 PRIMARY ADENOCARCINOMA OF NASAL CAVITY (H): Primary | ICD-10-CM

## 2024-08-08 PROCEDURE — 31237 NSL/SINS NDSC SURG BX POLYPC: CPT | Performed by: OTOLARYNGOLOGY

## 2024-08-08 PROCEDURE — 87205 SMEAR GRAM STAIN: CPT | Performed by: OTOLARYNGOLOGY

## 2024-08-08 PROCEDURE — 87075 CULTR BACTERIA EXCEPT BLOOD: CPT | Performed by: OTOLARYNGOLOGY

## 2024-08-08 PROCEDURE — 99000 SPECIMEN HANDLING OFFICE-LAB: CPT | Performed by: PATHOLOGY

## 2024-08-08 PROCEDURE — 99214 OFFICE O/P EST MOD 30 MIN: CPT | Mod: 25 | Performed by: OTOLARYNGOLOGY

## 2024-08-08 ASSESSMENT — PAIN SCALES - GENERAL: PAINLEVEL: NO PAIN (0)

## 2024-08-08 NOTE — LETTER
8/8/2024       RE: Rosalind Murphy  [address] [address]     Dear Colleague,    Thank you for referring your patient, Rosalind Murphy, to the [facility] EAR NOSE AND THROAT CLINIC [place] at [facility]. Please see a copy of my visit note below.      Procedure Date: 02/02/2023     PREOPERATIVE DIAGNOSES:    1.  Intestinal type sinonasal adenocarcinoma.  2.  Nasal obstruction.     PREOPERATIVE DIAGNOSES:    1.  Intestinal type sinonasal adenocarcinoma.  2.  Nasal obstruction.     PROCEDURE PERFORMED:     1.  Endoscopic endonasal transcribriform resection of anterior cranial fossa intradural tumor, left sinonasal adenocarcinoma.  2.  [place] of right-sided pedicled nasoseptal flap pedicled off the posterior septal branch of sphenopalatine artery.     SURGEON:  Germain Vyas MD     CO-SURGEON:  Abhi Tidwell MD     Completed radiation : 5/3/2023 in [place]      History of Present Illness: 54-year-old female with a diagnosis of intestinal type sinonasal adenocarcinoma status post transnasal endoscopic resection.  She completed radiation therapy on May 2023.  The patient is here for persistent nasal crusting.  She completed her last round of antibiotics back in May of this year with a lot of improvement.  She was treated with Bactrim for 14 days after obtaining sinonasal culture that grew staphylococcal aureus.  As suspected patient is very frustrated with this issue of crusting.    MEDICATIONS:     Current Outpatient Medications   Medication Sig Dispense Refill     acetaminophen (TYLENOL) 325 MG tablet Take 325-650 mg by mouth every 6 hours as needed for mild pain       Ascorbic Acid (VITAMIN C) 500 MG CAPS Take 1 capsule by mouth daily       COMPOUNDED NON-CONTROLLED SUBSTANCE (CMPD RX) - PHARMACY TO MIX COMPOUNDED MEDICATION Open Tobramycin capsule and empty contents into 240 ml of nasal saline mixture. Rinse each nasal cavity with 120 ml of  mixture twice daily. (Patient not taking: Reported on 3/8/2024) 60 capsule 1     diclofenac (VOLTAREN) 1 % topical gel Apply 2 g topically 2 times daily as needed       ibuprofen (ADVIL/MOTRIN) 200 MG tablet Take 200 mg by mouth every 4 hours as needed for pain       levocetirizine (XYZAL) 5 MG tablet Take by mouth every evening       levothyroxine (SYNTHROID/LEVOTHROID) 75 MCG tablet Take 75 mcg by mouth daily       Misc Natural Product Nasal (PONARIS) SOLN Use 2-4 drops in each nostril before bedtime. 30 mL 11     Misc Natural Product Nasal (PONARIS) SOLN Spray 2 drops in nostril 3 times daily 30 mL 3     ondansetron (ZOFRAN ODT) 4 MG ODT tab Take 1 tablet (4 mg) by mouth every 8 hours as needed for nausea (Patient not taking: Reported on 3/8/2024) 10 tablet 0     propranolol (INDERAL) 20 MG tablet Take 20 mg by mouth 2 times daily       rizatriptan (MAXALT) 10 MG tablet        spironolactone (ALDACTONE) 25 MG tablet Take 25 mg by mouth every morning         ALLERGIES:    Allergies   Allergen Reactions     Doxycycline Headache and Nausea     And Headaches       Hydroxychloroquine Headache and Tinnitus     Topiramate Tinnitus     Worsening Tinnitus       Scopolamine Other (See Comments)     Metal taste in mouth for days         PAST MEDICAL HISTORY:   Past Medical History:   Diagnosis Date     History of hysterectomy 03/06/2015     Hypothyroidism      Motion sickness      PONV (postoperative nausea and vomiting)      Primary adenocarcinoma of nasal cavity (H) 12/23/2022     S/P radiation therapy     6,000 cGy to sinonasal completed on 5/3/2023 [facility]        FAMILY HISTORY:    Family History   Problem Relation Age of Onset     Breast Cancer Mother      Breast Cancer Maternal Aunt      Breast Cancer Maternal Aunt      Lung Cancer Maternal Aunt      Bladder Cancer Maternal Aunt      Cancer Maternal Uncle         Eye       REVIEW OF SYSTEMS:  12 point ROS was negative other than the symptoms  noted above in the HPI.      Nasal endoscopy and debridement: Consent for nasal endoscopy and debridement was obtained.  I confirmed correctness of the procedure and identity of the patient.  Nasal endoscopy was indicated due to sinonasal grass and history of sinonasal malignancy.  The nose was topically decongested and anesthetized.  The nasal endoscope was passed under endoscopic vision.  There was abundant sinonasal crust that was removed today with suction, nasal forceps and curettes.  Under the crust there is a thin layer of yellowish-greenish secretion.  I did obtain cultures again today.  The right side is difficult to access because of the very limited space due to fibrosis.  Looking through the left side it appears to be that there is a piece of bone exposed at the septum.  After completing all the debridement I also suction the left maxillary sinus.  This maxillary sinus was full with secretion.         IMPRESSION AND PLAN: So patient continues to have severe sinonasal crusting.  I obtain another sinonasal culture today.  I did not start any antibiotics at this time.  I am placing a consult to infectious disease for some input.  She is going to continue with sinonasal conservative treatments.  Soon as we get the results of the cultures will communicate with the patient and start an antibiotic.        Lorenzo Hamilton MD, M.S.  Otolaryngology- Head & Neck Surgery  [phone]           Again, thank you for allowing me to participate in the care of your patient.      Sincerely,    Lorenzo Hamilton MD

## 2024-08-08 NOTE — PATIENT INSTRUCTIONS
You were seen in the ENT Clinic today by Dr. Hamilton. If you have any questions or concerns after your appointment, please contact us (see below)    The following has been recommended for you based upon your appointment today:   We will call you with the results of your culture from today and next steps   Infectious disease referral (they will call you to schedule)     How to Contact Us:  Send a [employer] message to your provider. Our team will respond to you via [employer]. Occasionally, we will need to call you to get further information.  For urgent matters (Monday-Friday), call the ENT Clinic: [phone] and speak with a call center team member - they will route your call appropriately.   If you'd like to speak directly with a nurse, please find our contact information below. We do our best to check voicemail frequently throughout the day, and will work to call you back within 1-2 days. For urgent matters, please use the general clinic phone numbers listed above.    Codi MORALES RN, BSN  RN Care Coordinator, ENT Clinic  [facility]  Direct: [phone]

## 2024-08-11 LAB
BACTERIA TISS BX CULT: ABNORMAL
GRAM STAIN RESULT: ABNORMAL
GRAM STAIN RESULT: ABNORMAL

## 2024-08-12 NOTE — CONFIDENTIAL NOTE
DIAGNOSIS:  Primary adenocarcinoma of nasal cavity    DATE RECEIVED:  08.14.2024     NOTES (Gather within 2 years) STATUS DETAILS   OFFICE NOTE from referring provider   Internal 08.08.2024 Lorenzo Hamilton MD    LABS (any labs) Internal    MEDICATION LIST Internal    IMAGING  (NEED IMAGES AND REPORTS)     Other (anything related to diagnoses Internal 05.09.2024, 11.02.2023  MR SINONASAL/ORAL CAVITY/PAROTID WWO CONTRAST     08.16.2023  MR SKULL BASE W/O & W CONTRAST 8/16/2023

## 2024-08-14 ENCOUNTER — PRE VISIT (OUTPATIENT)
Dept: INFECTIOUS DISEASES | Facility: CLINIC | Age: 54
End: 2024-08-14
Payer: COMMERCIAL

## 2024-08-14 ENCOUNTER — VIRTUAL VISIT (OUTPATIENT)
Dept: INFECTIOUS DISEASES | Facility: CLINIC | Age: 54
End: 2024-08-14
Attending: STUDENT IN AN ORGANIZED HEALTH CARE EDUCATION/TRAINING PROGRAM
Payer: COMMERCIAL

## 2024-08-14 VITALS — BODY MASS INDEX: 21.98 KG/M2 | WEIGHT: 145 LBS | HEIGHT: 68 IN

## 2024-08-14 DIAGNOSIS — A49.01 MSSA (METHICILLIN SUSCEPTIBLE STAPHYLOCOCCUS AUREUS): ICD-10-CM

## 2024-08-14 DIAGNOSIS — C30.0 PRIMARY ADENOCARCINOMA OF NASAL CAVITY (H): ICD-10-CM

## 2024-08-14 DIAGNOSIS — J34.89 NASAL OBSTRUCTION: Primary | ICD-10-CM

## 2024-08-14 PROCEDURE — 99205 OFFICE O/P NEW HI 60 MIN: CPT | Mod: 95 | Performed by: STUDENT IN AN ORGANIZED HEALTH CARE EDUCATION/TRAINING PROGRAM

## 2024-08-14 ASSESSMENT — PAIN SCALES - GENERAL: PAINLEVEL: NO PAIN (0)

## 2024-08-14 NOTE — NURSING NOTE
Current patient location: Pt's in her car at work per pt     Is the patient currently in the state of [place]? YES    Visit mode:VIDEO    If the visit is dropped, the patient can be reconnected by: VIDEO VISIT: Text to cell phone:   Telephone Information:   Mobile [phone]       Will anyone else be joining the visit? NO  (If patient encounters technical issues they should call [phone] :[phone])    How would you like to obtain your AVS? MyChart    Are changes needed to the allergy or medication list? No    Are refills needed on medications prescribed by this physician? NO    Rooming Documentation:  Questionnaire(s) not pre-assigned      Reason for visit: Consult    No other vitals to report per pt    Gely CASTELLANOSF

## 2024-08-14 NOTE — LETTER
8/14/2024       RE: Rosalind Murphy  [address] [address]     Dear Colleague,    Thank you for referring your patient, Rosalind Murphy, to the [facility] INFECTIOUS DISEASE CLINIC [place] at [facility]. Please see a copy of my visit note below.    Virtual Visit Details  Type of service:  Video Visit   Originating Location (pt. Location): Other car    Distant Location (provider location):  Off-site  Platform used for Video Visit: Let  Video time- 3.05 pm to 3.40 pm   Total time including chart review, care-coordination and documentation time on the date of encounter - 60 mins        [facility] INFECTIOUS DISEASE CLINIC [place]    [address] [address]  Phone: [phone]  Fax: [phone]      Today's Date: 08/14/2024    Recommendations:   -Will discuss with ENT endoscopy findings and imaging modality going forward (have messaged Dr. Lou)  -Meantime discussed ways to decrease dryness of the mucosa from prior radiation with steam inhalation of oil in water twice daily.     RTC 1 month     Thank you for involving Infectious Disease in the care of this patient.     Dr. Chelo Jay  Division of Infectious Disease  [facility]      Assessment:  Rosalind Murphy is a 54 year old with PMH of RA on no meds- quiet for 12 years   Hypothyroidisim, sinonasal cancer diagnosed in 2022 s/p surgery and radiation in the first half of 2023.     Recurrent nasal obstruction, crusting and discharge since mid 2023/radiation: s/p multiple courses of abxs, gets better with it and worsens on stopping it. All cultures with MSSA. Latest nasal endoscopy with possible exposed bone of the nasal septum, ?osteomyelitis. Last MRI 5/2024 was not suggestive of osteo.    - Immunization status - to address later  "    -------------------------------------------------------------------------------------------------------------------    Reason for consult / Chief complaint:   Consulted by Dr. Lorenzo Lou-Deangelo* for recurrent nasal infection     History of presenting illness:  Rosalind Murphy is a 54 year old with PMH of RA on no meds- quiet for 12 years   Hypothyroidisim, sinonasal cancer diagnosed in 2022.     2022 she saw PCP for what she thought was recurrent sinus infections   She was sent to ENT by PCP - Dec 2022 was determined to be sinonasal cancer   Jan 2023 had surgery for the tumor, had a second surgery in feb 2023 with neurosurgery due to extension   Subsequently had Radiation - maxilla, nose, face bilateral   Since then has had cycles of dryness and crustiness of the nose alternating with yellow discharge   Crusting occurs left side of the nose, mucus discharge is from both sides   Crust sometimes goes to throat and she coughs it up   She feels nasal obstruction and Frontal headache  Top part of the nose is tender to touch  For these she has been off and on abxs since May 2023 - usually oral abxs for 2 weeks (clindamycin, keflex, augmentin) and nasal rinse abx for a month at a time  Last abx was in May 2024 - bactrim for 2 weeks  She feels good for a couple of weeks after the oral abx (Abx is the only thing that clears it up), then the crust starts building up   Family has noticed a smell with the recent infection, she does not have a sense of smell.  Of note all nasal cultures have grown staph aureus     She has been seeing ENT, thought due to dryness and she has was nasal saline rinses everyday (Deion-Med), recently cut back to twice weekly. She has been on Pon[place] oil nasal drops at bedtime everyday for the past year.     She saw ENT recently and cleaned it up   8/8 nasal endoscopy   \"The nasal endoscope was passed under endoscopic vision. There was abundant sinonasal crust that was removed today with " "suction, nasal forceps and curettes. Under the crust there is a thin layer of yellowish-greenish secretion. I did obtain cultures again today. The right side is difficult to access because of the very limited space due to fibrosis. Looking through the left side it appears to be that there is a piece of bone exposed at the septum. After completing all the debridement I also suction the left maxillary sinus. This maxillary sinus was full with secretion.\"        Patient Active Problem List   Diagnosis     Sinonasal undifferentiated carcinoma (H)     Nasal obstruction     RA, quiet for 12 years   Hypothyroidisim       Allergies:   Allergies   Allergen Reactions     Doxycycline Headache and Nausea     And Headaches       Hydroxychloroquine Headache and Tinnitus     Topiramate Tinnitus     Worsening Tinnitus       Scopolamine Other (See Comments)     Metal taste in mouth for days         Medications:  Current Outpatient Medications   Medication Sig Dispense Refill     levothyroxine (SYNTHROID/LEVOTHROID) 75 MCG tablet Take 75 mcg by mouth daily       propranolol (INDERAL) 20 MG tablet Take 20 mg by mouth 2 times daily       spironolactone (ALDACTONE) 25 MG tablet Take 25 mg by mouth every morning       acetaminophen (TYLENOL) 325 MG tablet Take 325-650 mg by mouth every 6 hours as needed for mild pain       Ascorbic Acid (VITAMIN C) 500 MG CAPS Take 1 capsule by mouth daily       COMPOUNDED NON-CONTROLLED SUBSTANCE (CMPD RX) - PHARMACY TO MIX COMPOUNDED MEDICATION Open Tobramycin capsule and empty contents into 240 ml of nasal saline mixture. Rinse each nasal cavity with 120 ml of mixture twice daily. (Patient not taking: Reported on 3/8/2024) 60 capsule 1     diclofenac (VOLTAREN) 1 % topical gel Apply 2 g topically 2 times daily as needed       ibuprofen (ADVIL/MOTRIN) 200 MG tablet Take 200 mg by mouth every 4 hours as needed for pain       levocetirizine (XYZAL) 5 MG tablet Take by mouth every evening       Misc " "Natural Product Nasal (PONARIS) SOLN Use 2-4 drops in each nostril before bedtime. 30 mL 11     Misc Natural Product Nasal (PONARIS) SOLN Spray 2 drops in nostril 3 times daily 30 mL 3     ondansetron (ZOFRAN ODT) 4 MG ODT tab Take 1 tablet (4 mg) by mouth every 8 hours as needed for nausea 10 tablet 0     rizatriptan (MAXALT) 10 MG tablet            Immunizations:  Immunization History   Administered Date(s) Administered     COVID-19 MONOVALENT 12+ (Pfizer) 02/04/2021, 02/25/2021, 11/03/2021         Examination:  Vital signs:   Ht 1.727 m (5' 8\")   Wt 65.8 kg (145 lb)   BMI 22.05 kg/m      Unable to examine due to virtual visit       Laboratory:  Hematology:  Recent Labs   Lab Test 02/03/23  0738 02/02/23  1423   WBC 10.2  --    HGB 12.2 13.3   HCT 37.3  --      --        Chemistry:  Last Comprehensive Metabolic Panel:  Lab Results   Component Value Date     02/03/2023    POTASSIUM 4.1 02/03/2023    CHLORIDE 104 02/03/2023    CO2 26 02/03/2023    ANIONGAP 10 02/03/2023    GLC 89 02/05/2023    BUN 8.7 02/03/2023    CR 0.7 08/16/2023    GFRESTIMATED >60 08/16/2023    NERY 8.6 02/03/2023       Microbiology:   All with MSSA    5/26/23 ethmoid sinus swab   9/27/23 swab from maxillary sinus bilateral  11/9/23 swab from left nare   5/2/24 - right nasal mucosa  8/8/24 tissue from sinus    Staphylococcus aureus       KVNG     Ciprofloxacin <=0.5 ug/mL Susceptible *     Clindamycin 0.25 ug/mL Susceptible     Daptomycin 1 ug/mL Susceptible     Doxycycline <=0.5 ug/mL Susceptible     Erythromycin <=0.25 ug/mL Susceptible     Gentamicin <=0.5 ug/mL Susceptible     Inducible macrolide resistance test Negative ug/mL Negative *     Levofloxacin 0.25 ug/mL Susceptible *     Linezolid 2 ug/mL Susceptible *     Moxifloxacin <=0.25 ug/mL Susceptible *     Nitrofurantoin <=16 ug/mL Susceptible *     Oxacillin 0.5 ug/mL Susceptible 1     Rifampin <=0.5 ug/mL Susceptible *     Tetracycline <=1 ug/mL Susceptible     " Tigecycline <=0.12 ug/mL Susceptible *     Trimethoprim/Sulfamethoxazole >16/304 ug/mL Resistant     Vancomycin 1 ug/mL Susceptible        Imaging:  MRI 5/9/24   Postsurgical changes of left sinonasal resection including left sided  medial antrostomy, middle turbinectomy, ethmoidectomy, and frontal  sinusostomy. There is moderate residual mucosal thickening along the  margins of the surgical resection bed and within the left greater than  right maxillary sinuses, and within the right greater than left  ethmoid air cells. Continued opacification of the left sphenoid locule  with T1 hyperintense contents, consistent with proteinaceous debris.  Persistent dehiscence of the left cribriform plate with minimal  adjacent linear pachymeningeal enhancement, unchanged. No nodular  enhancement or abnormal signal of the adjacent brain parenchyma.    Impression:  Postsurgical sinonasal resection changes and mild to  moderate mucosal thickening without evidence of residual or recurrent  tumor.    MRI sinonasal/oral/parotid 11/2/23 with and without contrast   Postsurgical sinonasal resection changes including resection of the  left cribriform plate, bilateral maxillary antrostomies, and left  ethmoidectomy with mild to moderate mucosal thickening along the  resection site. No new nodular thickening or enhancement.    Impression: Postsurgical left sinonasal resection changes with  moderate mucosal thickening along the resection site and within the  paranasal sinuses. No evidence of residual or recurrent tumor.                Again, thank you for allowing me to participate in the care of your patient.      Sincerely,    Chelo Jay MD

## 2024-08-15 LAB — BACTERIA TISS BX CULT: ABNORMAL

## 2024-08-18 ENCOUNTER — PREP FOR PROCEDURE (OUTPATIENT)
Dept: OTOLARYNGOLOGY | Facility: CLINIC | Age: 54
End: 2024-08-18
Payer: COMMERCIAL

## 2024-08-18 DIAGNOSIS — C30.0 PRIMARY ADENOCARCINOMA OF NASAL CAVITY (H): Primary | ICD-10-CM

## 2024-08-20 ENCOUNTER — TELEPHONE (OUTPATIENT)
Dept: OTOLARYNGOLOGY | Facility: CLINIC | Age: 54
End: 2024-08-20
Payer: COMMERCIAL

## 2024-08-20 PROBLEM — C30.0: Status: ACTIVE | Noted: 2024-08-18

## 2024-08-20 NOTE — TELEPHONE ENCOUNTER
Scheduled surgery with Dr. Lou on 9/27/2024    Spoke with: Patient    Surgery is located at [facility]    Patient will be seen for their H&P by their PCP Karel Crowley within 30 days of surgery - Confirmed PCP on file is up to date     Does patient need a consult before upcoming surgery? No    Anesthesia type: General    Requested Imaging required for surgery: No    Patient needs scheduled for their 1 week post op    Patient will receive their surgery packet via [employer] per their preference    Patient was not provided a start time for surgery & is aware they will receive this information 3-5 days before surgery    Additional comments: Patient was instructed to call back with any further questions or concerns.     Gsielle Hernández on 8/20/2024 at 9:05 AM

## 2024-08-26 ENCOUNTER — TELEPHONE (OUTPATIENT)
Dept: INFECTIOUS DISEASES | Facility: CLINIC | Age: 54
End: 2024-08-26
Payer: COMMERCIAL

## 2024-08-26 NOTE — TELEPHONE ENCOUNTER
JUAN C LINM 8/26 to sched a MRI per Dr. Jay prior to 9/27. Left numbers to Imaging and ID.       ----- Message from Chelo Jay sent at 8/23/2024  3:24 PM CDT -----  Hi,   I ordered an MR sinonasal with contrast, Pls schedule. I hope it can be done before 9/27, if not I can change the order priority.   Thanks much  Chelo

## 2024-09-13 ENCOUNTER — ANCILLARY PROCEDURE (OUTPATIENT)
Dept: MRI IMAGING | Facility: CLINIC | Age: 54
End: 2024-09-13
Attending: STUDENT IN AN ORGANIZED HEALTH CARE EDUCATION/TRAINING PROGRAM
Payer: COMMERCIAL

## 2024-09-13 DIAGNOSIS — A49.01 MSSA (METHICILLIN SUSCEPTIBLE STAPHYLOCOCCUS AUREUS): ICD-10-CM

## 2024-09-13 DIAGNOSIS — J34.89 NASAL OBSTRUCTION: ICD-10-CM

## 2024-09-13 PROCEDURE — 70543 MRI ORBT/FAC/NCK W/O &W/DYE: CPT | Mod: TC | Performed by: RADIOLOGY

## 2024-09-13 PROCEDURE — A9585 GADOBUTROL INJECTION: HCPCS | Performed by: RADIOLOGY

## 2024-09-13 RX ORDER — GADOBUTROL 604.72 MG/ML
6.5 INJECTION INTRAVENOUS ONCE
Status: COMPLETED | OUTPATIENT
Start: 2024-09-13 | End: 2024-09-13

## 2024-09-13 RX ADMIN — GADOBUTROL 6.5 ML: 604.72 INJECTION INTRAVENOUS at 09:43

## 2024-09-26 ENCOUNTER — ANESTHESIA EVENT (OUTPATIENT)
Dept: SURGERY | Facility: AMBULATORY SURGERY CENTER | Age: 54
End: 2024-09-26
Payer: COMMERCIAL

## 2024-09-26 NOTE — ANESTHESIA PREPROCEDURE EVALUATION
Anesthesia Pre-Procedure Evaluation    Patient: Rosalind Murphy   MRN: [id number] : 1970        Procedure : Procedure(s):  Trans Nasal Endoscopy Approach with Debridement of Intranasal Exposed Bone          Past Medical History:   Diagnosis Date    History of hysterectomy 2015    Hypothyroidism     Motion sickness     PONV (postoperative nausea and vomiting)     Primary adenocarcinoma of nasal cavity (H) 2022    S/P radiation therapy     6,000 cGy to sinonasal completed on 5/3/2023 [facility]      Past Surgical History:   Procedure Laterality Date    CATARACT IOL, RT/LT Left     as a child    HYSTERECTOMY  2015    LAPAROTOMY EXPLORATORY      OPTICAL TRACKING SYSTEM ENDOSCOPIC EXCISION SINUS TUMOR Left 2023    Procedure: EXCISION, NEOPLASM, PARANASAL SINUS, ENDOSCOPIC, USING OPTICAL TRACKING SYSTEM;  Surgeon: Germain Vyas MD;  Location: [facility] OR    OPTICAL TRACKING SYSTEM ENDOSCOPIC EXCISION SINUS TUMOR Bilateral 2023    Procedure: stealth assisted endoscopic endonasal anterior craniofacial resection, nasoseptal flap;  Surgeon: Germain Vyas MD;  Location: [facility] OR    KY BIOPSY NASAL LESION  2022    PROCURE GRAFT FAT Left 2023    Procedure: fascia elsy graft, left upper thigh;  Surgeon: Germain Vyas MD;  Location: [facility] OR    RETINAL DETACHMENT SURGERY Left 2019    TONSILLECTOMY      age 6      Allergies   Allergen Reactions    Doxycycline Headache and Nausea     And Headaches      Hydroxychloroquine Headache and Tinnitus    Topiramate Tinnitus     Worsening Tinnitus      Scopolamine Other (See Comments)     Metal taste in mouth for days      Social History     Tobacco Use    Smoking status: Never     Passive exposure: Past (per pt)    Smokeless tobacco: Never   Substance Use Topics    Alcohol use: Yes     Comment: social use per patient      Wt Readings from Last 1 Encounters:   24 65.8 kg (145 lb)        Anesthesia Evaluation       "      ROS/MED HX  ENT/Pulmonary:       Neurologic:       Cardiovascular:       METS/Exercise Tolerance:     Hematologic:       Musculoskeletal:       GI/Hepatic:       Renal/Genitourinary:       Endo:       Psychiatric/Substance Use:       Infectious Disease:     (+)   MRSA,         Malignancy:       Other:            Physical Exam    Airway        Mallampati: II   TM distance: > 3 FB   Neck ROM: full   Mouth opening: > 3 cm    Respiratory Devices and Support         Dental       (+) Modest Abnormalities - crowns, retainers, 1 or 2 missing teeth      Cardiovascular   cardiovascular exam normal          Pulmonary   pulmonary exam normal            Other findings:  Placement Date: 02/02/23; Placement Time: 1459 (created via procedure documentation); Mask Ventilation: 0; Induction Type: Intravenous; Ease of Intubation: Easy; Technique: Video laryngoscopy; ETT Type: Single; Tube Size: 7 mm; VL Blade Size: Glide scope 4 (Anterior); Grade View: 1; Adjucts: Stylet; Placement Person: Resident; Attempts: 1; Depth: 21 cm    OUTSIDE LABS:  CBC:   Lab Results   Component Value Date    WBC 10.2 02/03/2023    HGB 12.2 02/03/2023    HGB 13.3 02/02/2023    HCT 37.3 02/03/2023     02/03/2023     BMP:   Lab Results   Component Value Date     02/03/2023    POTASSIUM 4.1 02/03/2023    CHLORIDE 104 02/03/2023    CO2 26 02/03/2023    BUN 8.7 02/03/2023    CR 0.7 08/16/2023    CR 0.74 02/03/2023    GLC 89 02/05/2023    GLC 97 02/04/2023     COAGS: No results found for: \"PTT\", \"INR\", \"FIBR\"  POC: No results found for: \"BGM\", \"HCG\", \"HCGS\"  HEPATIC: No results found for: \"ALBUMIN\", \"PROTTOTAL\", \"ALT\", \"AST\", \"GGT\", \"ALKPHOS\", \"BILITOTAL\", \"BILIDIRECT\", \"FENG\"  OTHER:   Lab Results   Component Value Date    NERY 8.6 02/03/2023    PHOS 3.4 02/03/2023    MAG 2.0 02/03/2023       Anesthesia Plan    ASA Status:  2    NPO Status:  NPO Appropriate    Anesthesia Type: General.     - Airway: ETT   Induction: Intravenous, Propofol. "   Maintenance: Balanced.        Consents    Anesthesia Plan(s) and associated risks, benefits, and realistic alternatives discussed. Questions answered and patient/representative(s) expressed understanding.     - Discussed: Risks, Benefits and Alternatives for BOTH SEDATION and the PROCEDURE were discussed     - Discussed with:  Patient, Spouse      - Extended Intubation/Ventilatory Support Discussed: No.      - Patient is DNR/DNI Status: No     Use of blood products discussed: No .     Postoperative Care    Pain management: IV analgesics, Oral pain medications, Multi-modal analgesia.   PONV prophylaxis: Ondansetron (or other 5HT-3), Dexamethasone or Solumedrol, Background Propofol Infusion     Comments:               Dmitriy Ny MD    I have reviewed the pertinent notes and labs in the chart from the past 30 days and (re)examined the patient.  Any updates or changes from those notes are reflected in this note.

## 2024-09-27 ENCOUNTER — HOSPITAL ENCOUNTER (OUTPATIENT)
Facility: AMBULATORY SURGERY CENTER | Age: 54
Discharge: HOME | End: 2024-09-27
Attending: OTOLARYNGOLOGY
Payer: COMMERCIAL

## 2024-09-27 ENCOUNTER — ANESTHESIA (OUTPATIENT)
Dept: SURGERY | Facility: AMBULATORY SURGERY CENTER | Age: 54
End: 2024-09-27
Payer: COMMERCIAL

## 2024-09-27 VITALS
WEIGHT: 147 LBS | TEMPERATURE: 96.9 F | RESPIRATION RATE: 16 BRPM | SYSTOLIC BLOOD PRESSURE: 101 MMHG | HEIGHT: 68 IN | OXYGEN SATURATION: 95 % | DIASTOLIC BLOOD PRESSURE: 61 MMHG | BODY MASS INDEX: 22.28 KG/M2 | HEART RATE: 65 BPM

## 2024-09-27 PROCEDURE — 87077 CULTURE AEROBIC IDENTIFY: CPT | Performed by: OTOLARYNGOLOGY

## 2024-09-27 PROCEDURE — 30560 LYSIS INTRANASAL SYNECHIA: CPT | Mod: RT

## 2024-09-27 PROCEDURE — 87070 CULTURE OTHR SPECIMN AEROBIC: CPT | Performed by: OTOLARYNGOLOGY

## 2024-09-27 PROCEDURE — 87102 FUNGUS ISOLATION CULTURE: CPT | Performed by: OTOLARYNGOLOGY

## 2024-09-27 PROCEDURE — 30801 ABLATE INF TURBINATE SUPERF: CPT | Mod: XS

## 2024-09-27 PROCEDURE — 31237 NSL/SINS NDSC SURG BX POLYPC: CPT | Mod: LT

## 2024-09-27 PROCEDURE — 99000 SPECIMEN HANDLING OFFICE-LAB: CPT | Performed by: PATHOLOGY

## 2024-09-27 RX ORDER — LIDOCAINE 40 MG/G
CREAM TOPICAL
Status: DISCONTINUED | OUTPATIENT
Start: 2024-09-27 | End: 2024-09-28 | Stop reason: HOSPADM

## 2024-09-27 RX ORDER — DEXAMETHASONE SODIUM PHOSPHATE 10 MG/ML
INJECTION, SOLUTION INTRAMUSCULAR; INTRAVENOUS PRN
Status: DISCONTINUED | OUTPATIENT
Start: 2024-09-27 | End: 2024-09-27

## 2024-09-27 RX ORDER — SODIUM CHLORIDE, SODIUM LACTATE, POTASSIUM CHLORIDE, CALCIUM CHLORIDE 600; 310; 30; 20 MG/100ML; MG/100ML; MG/100ML; MG/100ML
INJECTION, SOLUTION INTRAVENOUS CONTINUOUS
Status: DISCONTINUED | OUTPATIENT
Start: 2024-09-27 | End: 2024-09-28 | Stop reason: HOSPADM

## 2024-09-27 RX ORDER — OXYMETAZOLINE HYDROCHLORIDE 0.05 G/100ML
SPRAY NASAL PRN
Status: DISCONTINUED | OUTPATIENT
Start: 2024-09-27 | End: 2024-09-27 | Stop reason: HOSPADM

## 2024-09-27 RX ORDER — ONDANSETRON 2 MG/ML
INJECTION INTRAMUSCULAR; INTRAVENOUS PRN
Status: DISCONTINUED | OUTPATIENT
Start: 2024-09-27 | End: 2024-09-27

## 2024-09-27 RX ORDER — NALOXONE HYDROCHLORIDE 0.4 MG/ML
0.1 INJECTION, SOLUTION INTRAMUSCULAR; INTRAVENOUS; SUBCUTANEOUS
Status: DISCONTINUED | OUTPATIENT
Start: 2024-09-27 | End: 2024-09-28 | Stop reason: HOSPADM

## 2024-09-27 RX ORDER — ONDANSETRON 4 MG/1
4 TABLET, ORALLY DISINTEGRATING ORAL EVERY 30 MIN PRN
Status: DISCONTINUED | OUTPATIENT
Start: 2024-09-27 | End: 2024-09-28 | Stop reason: HOSPADM

## 2024-09-27 RX ORDER — PROPOFOL 10 MG/ML
INJECTION, EMULSION INTRAVENOUS CONTINUOUS PRN
Status: DISCONTINUED | OUTPATIENT
Start: 2024-09-27 | End: 2024-09-27

## 2024-09-27 RX ORDER — DEXAMETHASONE SODIUM PHOSPHATE 10 MG/ML
4 INJECTION, SOLUTION INTRAMUSCULAR; INTRAVENOUS
Status: DISCONTINUED | OUTPATIENT
Start: 2024-09-27 | End: 2024-09-28 | Stop reason: HOSPADM

## 2024-09-27 RX ORDER — ONDANSETRON 2 MG/ML
4 INJECTION INTRAMUSCULAR; INTRAVENOUS EVERY 30 MIN PRN
Status: DISCONTINUED | OUTPATIENT
Start: 2024-09-27 | End: 2024-09-28 | Stop reason: HOSPADM

## 2024-09-27 RX ORDER — LIDOCAINE HYDROCHLORIDE AND EPINEPHRINE 10; 10 MG/ML; UG/ML
INJECTION, SOLUTION INFILTRATION; PERINEURAL PRN
Status: DISCONTINUED | OUTPATIENT
Start: 2024-09-27 | End: 2024-09-27 | Stop reason: HOSPADM

## 2024-09-27 RX ORDER — FENTANYL CITRATE 50 UG/ML
50 INJECTION, SOLUTION INTRAMUSCULAR; INTRAVENOUS EVERY 5 MIN PRN
Status: DISCONTINUED | OUTPATIENT
Start: 2024-09-27 | End: 2024-09-28 | Stop reason: HOSPADM

## 2024-09-27 RX ORDER — ACETAMINOPHEN 325 MG/1
975 TABLET ORAL ONCE
Status: COMPLETED | OUTPATIENT
Start: 2024-09-27 | End: 2024-09-27

## 2024-09-27 RX ORDER — OXYCODONE HYDROCHLORIDE 5 MG/1
10 TABLET ORAL
Status: DISCONTINUED | OUTPATIENT
Start: 2024-09-27 | End: 2024-09-28 | Stop reason: HOSPADM

## 2024-09-27 RX ORDER — LIDOCAINE HYDROCHLORIDE 20 MG/ML
INJECTION, SOLUTION INFILTRATION; PERINEURAL PRN
Status: DISCONTINUED | OUTPATIENT
Start: 2024-09-27 | End: 2024-09-27

## 2024-09-27 RX ORDER — FENTANYL CITRATE 50 UG/ML
INJECTION, SOLUTION INTRAMUSCULAR; INTRAVENOUS PRN
Status: DISCONTINUED | OUTPATIENT
Start: 2024-09-27 | End: 2024-09-27

## 2024-09-27 RX ORDER — ACETAMINOPHEN 325 MG/1
325-650 TABLET ORAL EVERY 6 HOURS PRN
COMMUNITY

## 2024-09-27 RX ORDER — OXYCODONE HYDROCHLORIDE 5 MG/1
5 TABLET ORAL
Status: COMPLETED | OUTPATIENT
Start: 2024-09-27 | End: 2024-09-27

## 2024-09-27 RX ORDER — FENTANYL CITRATE 50 UG/ML
25 INJECTION, SOLUTION INTRAMUSCULAR; INTRAVENOUS EVERY 5 MIN PRN
Status: DISCONTINUED | OUTPATIENT
Start: 2024-09-27 | End: 2024-09-28 | Stop reason: HOSPADM

## 2024-09-27 RX ORDER — HYDROMORPHONE HYDROCHLORIDE 1 MG/ML
0.2 INJECTION, SOLUTION INTRAMUSCULAR; INTRAVENOUS; SUBCUTANEOUS EVERY 5 MIN PRN
Status: DISCONTINUED | OUTPATIENT
Start: 2024-09-27 | End: 2024-09-28 | Stop reason: HOSPADM

## 2024-09-27 RX ORDER — PROPOFOL 10 MG/ML
INJECTION, EMULSION INTRAVENOUS PRN
Status: DISCONTINUED | OUTPATIENT
Start: 2024-09-27 | End: 2024-09-27

## 2024-09-27 RX ORDER — HYDROMORPHONE HYDROCHLORIDE 1 MG/ML
0.4 INJECTION, SOLUTION INTRAMUSCULAR; INTRAVENOUS; SUBCUTANEOUS EVERY 5 MIN PRN
Status: DISCONTINUED | OUTPATIENT
Start: 2024-09-27 | End: 2024-09-28 | Stop reason: HOSPADM

## 2024-09-27 RX ORDER — LABETALOL HYDROCHLORIDE 5 MG/ML
10 INJECTION, SOLUTION INTRAVENOUS
Status: DISCONTINUED | OUTPATIENT
Start: 2024-09-27 | End: 2024-09-28 | Stop reason: HOSPADM

## 2024-09-27 RX ADMIN — LIDOCAINE HYDROCHLORIDE 80 MG: 20 INJECTION, SOLUTION INFILTRATION; PERINEURAL at 07:45

## 2024-09-27 RX ADMIN — Medication 50 MCG: at 08:36

## 2024-09-27 RX ADMIN — PROPOFOL 150 MG: 10 INJECTION, EMULSION INTRAVENOUS at 07:45

## 2024-09-27 RX ADMIN — Medication 50 MCG: at 08:02

## 2024-09-27 RX ADMIN — PROPOFOL 200 MCG/KG/MIN: 10 INJECTION, EMULSION INTRAVENOUS at 07:45

## 2024-09-27 RX ADMIN — Medication 40 MG: at 07:46

## 2024-09-27 RX ADMIN — OXYCODONE HYDROCHLORIDE 5 MG: 5 TABLET ORAL at 09:26

## 2024-09-27 RX ADMIN — SODIUM CHLORIDE, SODIUM LACTATE, POTASSIUM CHLORIDE, CALCIUM CHLORIDE: 600; 310; 30; 20 INJECTION, SOLUTION INTRAVENOUS at 06:46

## 2024-09-27 RX ADMIN — FENTANYL CITRATE 25 MCG: 50 INJECTION, SOLUTION INTRAMUSCULAR; INTRAVENOUS at 09:30

## 2024-09-27 RX ADMIN — DEXAMETHASONE SODIUM PHOSPHATE 10 MG: 10 INJECTION, SOLUTION INTRAMUSCULAR; INTRAVENOUS at 07:45

## 2024-09-27 RX ADMIN — FENTANYL CITRATE 50 MCG: 50 INJECTION, SOLUTION INTRAMUSCULAR; INTRAVENOUS at 07:45

## 2024-09-27 RX ADMIN — ACETAMINOPHEN 975 MG: 325 TABLET ORAL at 06:34

## 2024-09-27 RX ADMIN — Medication 50 MCG: at 08:15

## 2024-09-27 RX ADMIN — SODIUM CHLORIDE, SODIUM LACTATE, POTASSIUM CHLORIDE, CALCIUM CHLORIDE: 600; 310; 30; 20 INJECTION, SOLUTION INTRAVENOUS at 08:27

## 2024-09-27 RX ADMIN — FENTANYL CITRATE 50 MCG: 50 INJECTION, SOLUTION INTRAMUSCULAR; INTRAVENOUS at 07:57

## 2024-09-27 RX ADMIN — PROPOFOL 120 MCG/KG/MIN: 10 INJECTION, EMULSION INTRAVENOUS at 08:31

## 2024-09-27 RX ADMIN — Medication 50 MCG: at 08:10

## 2024-09-27 RX ADMIN — FENTANYL CITRATE 25 MCG: 50 INJECTION, SOLUTION INTRAMUSCULAR; INTRAVENOUS at 09:24

## 2024-09-27 RX ADMIN — ONDANSETRON 4 MG: 2 INJECTION INTRAMUSCULAR; INTRAVENOUS at 08:29

## 2024-09-27 RX ADMIN — Medication 100 MCG: at 07:50

## 2024-09-27 NOTE — DISCHARGE INSTRUCTIONS
Resume nasal rinses with saline solution three times a day. Once the culture results are out I will give you a call  No heavy exercise, no heavy lifting, You have a follow-up appointment in 2 weeks (already scheduled)  If you feel any nasal pain please take tylenol 500 mg PO every 6 hrs    [facility]  Home Care Following Anesthesia  For 24 hours after surgery:  Get plenty of rest.  A responsible adult must stay with you for at least 24 hours after you leave the surgery center.  Do not drive or use heavy equipment.  If you have weakness or tingling, don't drive or use heavy equipment until this feeling goes away.   Do not drink alcohol.   Avoid strenuous or risky activities.  Ask for help when climbing stairs.  You may feel lightheaded.  IF so, sit for a few minutes before standing.  Have someone help you get up.   If you have nausea (feel sick to your stomach): Drink only clear liquids such as apple juice, ginger ale, broth or 7-Up.  Rest may also help.  Be sure to drink enough fluids.  Move to a regular diet as you feel able.   You may have a slight fever.  Call the doctor if your fever is over 100 F (37.7 C) (taken under the tongue) or lasts longer than 24 hours.  You may have a dry mouth, a sore throat, muscle aches or trouble sleeping. These should go away after 24 hours.  Do not make important or legal decisions.   It is recommended to avoid smoking.               Tips for taking pain medications  To get the best pain relief possible, remember these points:  Take pain medications as directed, before pain becomes severe.  Pain medication can upset your stomach: taking it with food may help.  Constipation is a common side effect of pain medication. Drink plenty of  fluids.  Eat foods high in fiber. Take a stool softener if recommended by your doctor or pharmacist.  Do not drink alcohol, drive or operate machinery while taking pain medications.  Ask about other ways to control  pain, such as with heat, ice or relaxation.    Tylenol/Acetaminophen Consumption    If you feel your pain relief is insufficient, you may take Tylenol/Acetaminophen in addition to your narcotic pain medication.   Be careful not to exceed 4,000 mg of Tylenol/Acetaminophen in a 24 hour period from all sources.  If you are taking extra strength Tylenol/acetaminophen (500 mg), the maximum dose is 8 tablets in 24 hours.  If you are taking regular strength acetaminophen (325 mg), the maximum dose is 12 tablets in 24 hours.    Call a doctor for any of the following:  Signs of infection (fever, growing tenderness at the surgery site, a large amount of drainage or bleeding, severe pain, foul-smelling drainage, redness, swelling).  It has been over 8 to 10 hours since surgery and you are still not able to urinate (pass water).  Headache for over 24 hours.  Numbness, tingling or weakness the day after surgery (if you had spinal anesthesia).  Signs of Covid-19 infection (temperature over 100 degrees, shortness of breath, cough, loss of taste/smell, generalized body aches, persistent headache, chills, sore throat, nausea/vomiting/diarrhea)  Your doctor is:  Dr. Ambriz, ENT Otolaryngology: [phone]                  Or dial [phone] and ask for the resident on call for:  ENT Otolaryngology  For emergency care, call the:  [facility]:  [phone] (TTY for hearing impaired: [phone])

## 2024-09-27 NOTE — ANESTHESIA POSTPROCEDURE EVALUATION
Patient: Rosalind Murphy    Procedure: Procedure(s):  Trans Nasal Endoscopy Approach with Debridement of Intranasal Exposed Bone       Anesthesia Type:  General    Note:  Disposition: Outpatient   Postop Pain Control: Uneventful            Sign Out: Well controlled pain   PONV: No   Neuro/Psych: Uneventful            Sign Out: Acceptable/Baseline neuro status   Airway/Respiratory: Uneventful            Sign Out: Acceptable/Baseline resp. status   CV/Hemodynamics: Uneventful            Sign Out: Acceptable CV status; No obvious hypovolemia; No obvious fluid overload   Other NRE:    DID A NON-ROUTINE EVENT OCCUR?            Last vitals:  Vitals Value Taken Time   BP 97/58 09/27/24 0940   Temp 36.2  C (97.1  F) 09/27/24 0940   Pulse 65 09/27/24 0940   Resp 16 09/27/24 0940   SpO2 94 % 09/27/24 0940   Vitals shown include unfiled device data.    Electronically Signed By: Dmitriy Ny MD  September 27, 2024  12:36 PM

## 2024-09-27 NOTE — ANESTHESIA PROCEDURE NOTES
Airway       Patient location during procedure: OR       Procedure Start/Stop Times: 9/27/2024 7:48 AM  Staff -        Anesthesiologist:  Dmitriy Ny MD       CRNA: Lolita Owen APRN CRNA       Performed By: CRNA  Consent for Airway        Urgency: elective  Indications and Patient Condition       Indications for airway management: ruben-procedural       Induction type:intravenous       Mask difficulty assessment: 1 - vent by mask    Final Airway Details       Final airway type: endotracheal airway       Successful airway: Oral and OMEGA  Endotracheal Airway Details        ETT size (mm): 7.0       Cuffed: yes       Successful intubation technique: direct laryngoscopy       DL Blade Type: Nunes 2       Grade View of Cords: 2       Adjucts: stylet       Position: Center       Measured from: lips (at bend in oral omega)       Bite block used: None    Post intubation assessment        Placement verified by: capnometry, equal breath sounds and chest rise        Number of attempts at approach: 1       Number of other approaches attempted: 0       Secured with: tape       Ease of procedure: easy       Dentition: Intact and Unchanged    Medication(s) Administered   Medication Administration Time: 9/27/2024 7:48 AM

## 2024-09-27 NOTE — ANESTHESIA CARE TRANSFER NOTE
Patient: Rosalind Murphy    Procedure: Procedure(s):  Trans Nasal Endoscopy Approach with Debridement of Intranasal Exposed Bone       Diagnosis: Primary adenocarcinoma of nasal cavity (H) [C30.0]  Diagnosis Additional Information: No value filed.    Anesthesia Type:   General     Note:    Oropharynx: oropharynx clear of all foreign objects and spontaneously breathing  Level of Consciousness: drowsy  Oxygen Supplementation: room air    Independent Airway: airway patency satisfactory and stable  Dentition: dentition unchanged  Vital Signs Stable: post-procedure vital signs reviewed and stable  Report to RN Given: handoff report given  Patient transferred to: PACU  Comments: 102/58  HR: 68  SpO2: 91%  RR: 13    Vital signs per nursing documentation.       Handoff Report: Identifed the Patient, Identified the Reponsible Provider, Reviewed the pertinent medical history, Discussed the surgical course, Reviewed Intra-OP anesthesia mangement and issues during anesthesia, Set expectations for post-procedure period and Allowed opportunity for questions and acknowledgement of understanding  Vitals:  Vitals Value Taken Time   BP 96/50 09/27/24 0857   Temp     Pulse 73 09/27/24 0900   Resp 14 09/27/24 0900   SpO2 89 % 09/27/24 0900   Vitals shown include unfiled device data.    Electronically Signed By: LUIS ANGEL Mckeon CRNA  September 27, 2024  9:01 AM

## 2024-09-27 NOTE — BRIEF OP NOTE
[facility] Brief Operative Note    Pre-operative diagnosis: Primary adenocarcinoma of nasal cavity (H) [C30.0]  Chronic sinonasal crusting  Possible sinonasal exposed bone   Post-operative diagnosis Chronic  sinonasal crusting  Right nasal synechiae     Procedure: Procedure(s):  Trans Nasal Endoscopy Approach with Debridement of Intranasal Exposed Bone  Lysis of right nasal synechiae   Surgeon(s): Surgeons and Role:     * Lorenzo Hamilton MD - Primary   Estimated blood loss: 10 ml   Specimens: ID Type Source Tests Collected by Time Destination   A : sinonasal passage Tissue Nose ANAEROBIC BACTERIAL CULTURE ROUTINE, FUNGAL OR YEAST CULTURE ROUTINE, AEROBIC BACTERIAL CULTURE ROUTINE Lorenzo Hamilton MD 9/27/2024  7:59 AM    B : left sinonasal Tissue Nose ANAEROBIC BACTERIAL CULTURE ROUTINE, FUNGAL OR YEAST CULTURE ROUTINE, AEROBIC BACTERIAL CULTURE ROUTINE Lorenzo Hamilton MD 9/27/2024  8:05 AM    C : left posterior nasal cavity Tissue Nose ANAEROBIC BACTERIAL CULTURE ROUTINE, FUNGAL OR YEAST CULTURE ROUTINE, AEROBIC BACTERIAL CULTURE ROUTINE Lorenzo Hamilton MD 9/27/2024  8:10 AM       Findings: Abundant sinonasal crusting. Purulent material all through the sinus cavity. Severe nasal-sinus mucosa inflammation -edema. No evidence of exposed bone

## 2024-09-27 NOTE — OP NOTE
Preoperative diagnosis: History of sinonasal intestinal type adenocarcinoma, chronic severe sinonasal crusting, possible sinonasal exposed bone.    Postoperative diagnosis: History of sinonasal intestinal type adenocarcinoma, chronic severe sinonasal crusting, right sinonasal synechia    Procedure: Transnasal endoscopic approach with excision of abundant sinonasal crust, close examination of the sinonasal cavity, lysis of right sinonasal synechia    Surgeon: Lorenzo Michelle MD    Anesthesia: General With endotracheal tube    Estimated blood loss: 10 cc    Complications none    Condition: The patient was transferred extubated to the postoperative care unit under stable conditions.    Briefly Mrs. Murphy is a 54-year-old female with history of sinonasal intestinal type adenocarcinoma.  Patient underwent transcript from approach with resection of intestinal type adenocarcinoma.  This was followed by radiation therapy completed on May 3, 2023.  The patient has been struggling with severe sinonasal crusting.  She has received multiple medical treatments with no improvement.  She also had right sinonasal synechia with nasal obstruction on the right.  She was advised to undergo transnasal endoscopic approach to rule out exposed bone as well as lysis of right sinonasal synechia.    Procedure:    All the risk and benefits of the procedure were explained to the patient.  Informed consent was obtained.  The patient was taken to the operating room today September 27, 2024.  The patient was placed in the supine position general anesthesia was induced.  An orotracheal tube was placed with no difficulties.  Lower SCDs were applied.  The operating table was turned 90 degrees.  Sterile drapes were applied to the patient's face.  Timeout was performed by myself and the operating room staff.  Following this I used a 0 degree nasal endoscope to gain access into the nasal cavity.  Initial inspection shows abundant sinonasal  crust.  All the crust was removed using a combination of suction and straight Blakesley forceps.  Multiple cultures were obtained throughout the procedure.  Cultures were sent for aerobic, anaerobic and fungal.  Once the crust was removed mainly on the left side.  Abundant saline solution irrigation was performed.  There was abundant purulent secretion in the left maxillary sinus.  The left maxillary sinus was irrigated with 60 cc of saline solution.  At this time we we advanced the scope through the right sinonasal passages.  There is a synechia between the septum and the inferior turbinate as well as a synechia between the septum and the middle turbinate.  The synechia was injected with 1% lidocaine with 1 to 100,000 units of epinephrine.  At this time I  the synechia using a Corydon elevator.  This was done for the inferior turbinate as well as the middle turbinate.  The septum was mobilized to the left with the Corydon very gentle.  I also used the suction cautery to shrink down of the mucosa of the right inferior turbinate.  I next a explored in the sinonasal cavity looking for exposed bone.  I did not find any exposed bone within the nasal passages.  At this time I applied bilateral Costello splints the Costello splints were secured with a 3-0 nylon.  At this time the procedure was ended the patient was awakened extubated transferred to recovery room in stable conditions.

## 2024-09-29 LAB
BACTERIA SPEC CULT: ABNORMAL
BACTERIA SPEC CULT: ABNORMAL

## 2024-10-01 ENCOUNTER — TELEPHONE (OUTPATIENT)
Dept: INFECTIOUS DISEASES | Facility: CLINIC | Age: 54
End: 2024-10-01
Payer: COMMERCIAL

## 2024-10-01 DIAGNOSIS — C30.0 PRIMARY ADENOCARCINOMA OF NASAL CAVITY (H): Primary | ICD-10-CM

## 2024-10-01 LAB — BACTERIA SPEC CULT: ABNORMAL

## 2024-10-01 RX ORDER — DOXYCYCLINE HYCLATE 100 MG
100 TABLET ORAL 2 TIMES DAILY
Qty: 60 TABLET | Refills: 1 | Status: SHIPPED | OUTPATIENT
Start: 2024-10-01

## 2024-10-01 NOTE — TELEPHONE ENCOUNTER
Below order placed. Called patient to discuss. She was amenable to plan. We discussed doxy being on her allergy list, she stated previously it has made her nauseous. She would like to try it and see how she tolerates it. She will discuss with pharmacist when she picks up today and will call us back if it is not manageable. Patient had no further questions.        ----- Message from Chelo Jay sent at 9/30/2024  6:08 PM CDT -----  Thanks for letting me know. I think we can do a very long course of oral abxs and perhaps even chronic suppressive. I am thinking perhaps radiation issues ?    ID nurses, lets do doxycycline 100 mg po bid, dispense 60, 1 refill. Follow up in 1 month.     Thanks  Chelo  ----- Message -----  From: Lorenzo Hamilton MD  Sent: 9/29/2024   1:50 PM CDT  To: MD Chelo Camacho,  I took Mrs Murphy to the OR on Friday.  There was a lot of crusting, purulent secretions and severe edema of the sinus mucosa. No exposed bone seen. I sent multiple cultures. Do you think she will need long term IV antibiotics? She has had so many PO antibiotic treatments...Any way I will let you give us the most appropriate next step.  Thanks  Lorenzo

## 2024-10-04 LAB
BACTERIA SPEC CULT: ABNORMAL
BACTERIA SPEC CULT: NORMAL

## 2024-10-10 ENCOUNTER — OFFICE VISIT (OUTPATIENT)
Dept: OTOLARYNGOLOGY | Facility: CLINIC | Age: 54
End: 2024-10-10
Payer: COMMERCIAL

## 2024-10-10 VITALS
DIASTOLIC BLOOD PRESSURE: 78 MMHG | BODY MASS INDEX: 22.75 KG/M2 | SYSTOLIC BLOOD PRESSURE: 115 MMHG | HEIGHT: 68 IN | WEIGHT: 150.1 LBS | OXYGEN SATURATION: 100 % | HEART RATE: 61 BPM

## 2024-10-10 DIAGNOSIS — C30.0 PRIMARY ADENOCARCINOMA OF NASAL CAVITY (H): Primary | ICD-10-CM

## 2024-10-10 PROCEDURE — 31237 NSL/SINS NDSC SURG BX POLYPC: CPT | Mod: RT | Performed by: OTOLARYNGOLOGY

## 2024-10-10 ASSESSMENT — PAIN SCALES - GENERAL: PAINLEVEL: NO PAIN (0)

## 2024-10-10 NOTE — NURSING NOTE
"Chief Complaint   Patient presents with    RECHECK   Blood pressure 115/78, pulse 61, height 1.727 m (5' 8\"), weight 68.1 kg (150 lb 1.6 oz), SpO2 100%. Vickey Rubi, EMT    "

## 2024-10-10 NOTE — LETTER
10/10/2024       RE: Rosalind Murphy  [address] [facility] [id number]     Dear Colleague,    Thank you for referring your patient, Rosalind Murphy, to the [facility] [place] at [facility]. Please see a copy of my visit note below.       Procedure Date: 02/02/2023    PREOPERATIVE DIAGNOSES:    1.  Intestinal type sinonasal adenocarcinoma.  2.  Nasal obstruction.     PREOPERATIVE DIAGNOSES:    1.  Intestinal type sinonasal adenocarcinoma.  2.  Nasal obstruction.     PROCEDURE PERFORMED:     1.  Endoscopic endonasal transcribriform resection of anterior cranial fossa intradural tumor, left sinonasal adenocarcinoma.  2.  Chromo of right-sided pedicled nasoseptal flap pedicled off the posterior septal branch of sphenopalatine artery.     SURGEON:  Germain Vyas MD     CO-SURGEON:  Abhi Tidwell MD     Completed radiation : 5/3/2023 in [place]    9/27/2024: Procedure: Transnasal endoscopic approach with excision of abundant sinonasal crust, close examination of the sinonasal cavity, lysis of right sinonasal synechia       History of Present Illness: 54-year-old female.  The patient is here today for her first postoperative visit after transnasal endoscopic approach with lysis of right sinonasal synechia and removal of abundant sinonasal crust.  The patient states that she is doing well.  She is bothered by the nasal splints.  She is currently taking doxycycline prescribed by our ID colleagues.    MEDICATIONS:     Current Outpatient Medications   Medication Sig Dispense Refill     acetaminophen (TYLENOL) 325 MG tablet Take 325-650 mg by mouth every 6 hours as needed for mild pain.       doxycycline hyclate (VIBRA-TABS) 100 MG tablet Take 1 tablet (100 mg) by mouth 2 times daily. 60 tablet 1     levothyroxine (SYNTHROID/LEVOTHROID) 75 MCG tablet Take 75 mcg by mouth daily       Misc Natural Product Nasal (PONARIS) SOLN Use 2-4  drops in each nostril before bedtime. 30 mL 11     ondansetron (ZOFRAN ODT) 4 MG ODT tab Take 1 tablet (4 mg) by mouth every 8 hours as needed for nausea 10 tablet 0     propranolol (INDERAL) 20 MG tablet Take 20 mg by mouth 2 times daily       rizatriptan (MAXALT) 10 MG tablet        spironolactone (ALDACTONE) 25 MG tablet Take 25 mg by mouth every morning         ALLERGIES:    Allergies   Allergen Reactions     Doxycycline Headache and Nausea     And Headaches       Hydroxychloroquine Headache and Tinnitus     Topiramate Tinnitus     Worsening Tinnitus       Scopolamine Other (See Comments)     Metal taste in mouth for days         PAST MEDICAL HISTORY:   Past Medical History:   Diagnosis Date     History of hysterectomy 03/06/2015     Hypothyroidism      Motion sickness      PONV (postoperative nausea and vomiting)      Primary adenocarcinoma of nasal cavity (H) 12/23/2022     S/P radiation therapy     6,000 cGy to sinonasal completed on 5/3/2023 [facility]        FAMILY HISTORY:    Family History   Problem Relation Age of Onset     Breast Cancer Mother      Breast Cancer Maternal Aunt      Breast Cancer Maternal Aunt      Lung Cancer Maternal Aunt      Bladder Cancer Maternal Aunt      Cancer Maternal Uncle         Eye       REVIEW OF SYSTEMS:  12 point ROS was negative other than the symptoms noted above in the HPI.       Nasal endoscopy: Consent for nasal endoscopy was obtained.  I confirmed correctness of the procedure and identity of the patient.  Nasal endoscopy was indicated due to recent transnasal endoscopic surgery.  The nose was topically decongested and anesthetized.  The nasal endoscope was passed under endoscopic vision.  I did remove the Costello splints remove the stitch and.  The right side where I did release the synechia looks very good.  There is no evidence of synechia.  Posteriorly the sinonasal cavity has very minimal crusting that was removed today.  The edema and  erythema of the sinonasal mucosa is way improved.  No evidence of masses or lesions are concerning.       IMPRESSION AND PLAN: 54-year-old female status post surgery followed by radiation therapy for her sinonasal intestinal type adenocarcinoma.  All her cultures from the last trip to the OR are showing staph.  Currently on doxycycline.  Today's nasal endoscopy is promising with improvement of the edema and erythema of the sinonasal cavity.  The plan is for her to continue antibiotic therapy.  I would like to see her back in about 3 weeks for another follow-up.      Lorenzo Hamilton MD, M.S.  Otolaryngology- Head & Neck Surgery  [phone]           Again, thank you for allowing me to participate in the care of your patient.      Sincerely,    Lorenzo Hamilton MD

## 2024-10-10 NOTE — PROGRESS NOTES
Procedure Date: 02/02/2023    PREOPERATIVE DIAGNOSES:    1.  Intestinal type sinonasal adenocarcinoma.  2.  Nasal obstruction.     PREOPERATIVE DIAGNOSES:    1.  Intestinal type sinonasal adenocarcinoma.  2.  Nasal obstruction.     PROCEDURE PERFORMED:     1.  Endoscopic endonasal transcribriform resection of anterior cranial fossa intradural tumor, left sinonasal adenocarcinoma.  2.  [place] of right-sided pedicled nasoseptal flap pedicled off the posterior septal branch of sphenopalatine artery.     SURGEON:  Germain Vyas MD     CO-SURGEON:  Abhi Tidwell MD     Completed radiation : 5/3/2023 in [place]    9/27/2024: Procedure: Transnasal endoscopic approach with excision of abundant sinonasal crust, close examination of the sinonasal cavity, lysis of right sinonasal synechia       History of Present Illness: 54-year-old female.  The patient is here today for her first postoperative visit after transnasal endoscopic approach with lysis of right sinonasal synechia and removal of abundant sinonasal crust.  The patient states that she is doing well.  She is bothered by the nasal splints.  She is currently taking doxycycline prescribed by our ID colleagues.    MEDICATIONS:     Current Outpatient Medications   Medication Sig Dispense Refill    acetaminophen (TYLENOL) 325 MG tablet Take 325-650 mg by mouth every 6 hours as needed for mild pain.      doxycycline hyclate (VIBRA-TABS) 100 MG tablet Take 1 tablet (100 mg) by mouth 2 times daily. 60 tablet 1    levothyroxine (SYNTHROID/LEVOTHROID) 75 MCG tablet Take 75 mcg by mouth daily      Misc Natural Product Nasal (PONARIS) SOLN Use 2-4 drops in each nostril before bedtime. 30 mL 11    ondansetron (ZOFRAN ODT) 4 MG ODT tab Take 1 tablet (4 mg) by mouth every 8 hours as needed for nausea 10 tablet 0    propranolol (INDERAL) 20 MG tablet Take 20 mg by mouth 2 times daily      rizatriptan (MAXALT) 10 MG tablet       spironolactone (ALDACTONE) 25 MG tablet Take  25 mg by mouth every morning         ALLERGIES:    Allergies   Allergen Reactions    Doxycycline Headache and Nausea     And Headaches      Hydroxychloroquine Headache and Tinnitus    Topiramate Tinnitus     Worsening Tinnitus      Scopolamine Other (See Comments)     Metal taste in mouth for days         PAST MEDICAL HISTORY:   Past Medical History:   Diagnosis Date    History of hysterectomy 03/06/2015    Hypothyroidism     Motion sickness     PONV (postoperative nausea and vomiting)     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        FAMILY HISTORY:    Family History   Problem Relation Age of Onset    Breast Cancer Mother     Breast Cancer Maternal Aunt     Breast Cancer Maternal Aunt     Lung Cancer Maternal Aunt     Bladder Cancer Maternal Aunt     Cancer Maternal Uncle         Eye       REVIEW OF SYSTEMS:  12 point ROS was negative other than the symptoms noted above in the HPI.       Nasal endoscopy: Consent for nasal endoscopy was obtained.  I confirmed correctness of the procedure and identity of the patient.  Nasal endoscopy was indicated due to recent transnasal endoscopic surgery.  The nose was topically decongested and anesthetized.  The nasal endoscope was passed under endoscopic vision.  I did remove the [place] splints remove the stitch and.  The right side where I did release the synechia looks very good.  There is no evidence of synechia.  Posteriorly the sinonasal cavity has very minimal crusting that was removed today.  The edema and erythema of the sinonasal mucosa is way improved.  No evidence of masses or lesions are concerning.       IMPRESSION AND PLAN: 54-year-old female status post surgery followed by radiation therapy for her sinonasal intestinal type adenocarcinoma.  All her cultures from the last trip to the OR are showing staph.  Currently on doxycycline.  Today's nasal  endoscopy is promising with improvement of the edema and erythema of the sinonasal cavity.  The plan is for her to continue antibiotic therapy.  I would like to see her back in about 3 weeks for another follow-up.      Lorenzo Hamilton MD, M.S.  Otolaryngology- Head & Neck Surgery  [phone]

## 2024-10-10 NOTE — PATIENT INSTRUCTIONS
You were seen in the ENT Clinic today by Dr. Hamilton. If you have any questions or concerns after your appointment, please contact us (see below)    Please return to clinic in 3 weeks for follow up with Dr. Lou     How to Contact Us:  Send a CQu[employer] message to your provider. Our team will respond to you via [employer]Qu[employer]ient. Occasionally, we will need to call you to get further information.  For urgent matters (Monday-Friday), call the ENT Clinic: [phone] and speak with a call center team member - they will route your call appropriately.   If you'd like to speak directly with a nurse, please find our contact information below. We do our best to check voicemail frequently throughout the day, and will work to call you back within 1-2 days. For urgent matters, please use the general clinic phone numbers listed above.    Codi MORALES RN, BSN  RN Care Coordinator, ENT Clinic  [facility]  Direct: [phone]

## 2024-10-11 ENCOUNTER — TELEPHONE (OUTPATIENT)
Dept: OTOLARYNGOLOGY | Facility: CLINIC | Age: 54
End: 2024-10-11
Payer: COMMERCIAL

## 2024-10-11 NOTE — TELEPHONE ENCOUNTER
Patient confirmed scheduled appointment:  Date: 10/31  Time: 3:20pm  Visit type: Return ENT   Provider: Carmine  [place] [facility] [place]:   Additional notes:

## 2024-10-18 ENCOUNTER — VIRTUAL VISIT (OUTPATIENT)
Dept: INFECTIOUS DISEASES | Facility: CLINIC | Age: 54
End: 2024-10-18
Attending: STUDENT IN AN ORGANIZED HEALTH CARE EDUCATION/TRAINING PROGRAM
Payer: COMMERCIAL

## 2024-10-18 VITALS — BODY MASS INDEX: 22.28 KG/M2 | HEIGHT: 68 IN | WEIGHT: 147 LBS

## 2024-10-18 DIAGNOSIS — C30.0 PRIMARY ADENOCARCINOMA OF NASAL CAVITY (H): ICD-10-CM

## 2024-10-18 DIAGNOSIS — Z23 NEED FOR VACCINATION: ICD-10-CM

## 2024-10-18 DIAGNOSIS — J34.89 NASAL OBSTRUCTION: ICD-10-CM

## 2024-10-18 DIAGNOSIS — A49.01 MSSA (METHICILLIN SUSCEPTIBLE STAPHYLOCOCCUS AUREUS) INFECTION: Primary | ICD-10-CM

## 2024-10-18 PROCEDURE — 99214 OFFICE O/P EST MOD 30 MIN: CPT | Mod: 95 | Performed by: STUDENT IN AN ORGANIZED HEALTH CARE EDUCATION/TRAINING PROGRAM

## 2024-10-18 RX ORDER — DOXYCYCLINE HYCLATE 100 MG
100 TABLET ORAL 2 TIMES DAILY
Qty: 180 TABLET | Refills: 1 | Status: SHIPPED | OUTPATIENT
Start: 2024-10-18

## 2024-10-18 ASSESSMENT — PAIN SCALES - GENERAL: PAINLEVEL: NO PAIN (0)

## 2024-10-18 NOTE — PROGRESS NOTES
Virtual Visit Details  Type of service:  Video Visit   Originating Location (pt. Location): Home    Distant Location (provider location):  On site   Platform used for Video Visit: appEatIT  Video time- 4.28 to 4.51 pm           [facility] INFECTI[facility] DISEASE CLINIC [address] [address]  Phone: [phone]  Fax: [phone]      Today's Date: 10/18/2024      Recommendations:   Continue doxycycline 100 mg po bid. Will try for 3 months and go from there  To continue oil nasal rinses   Due for annual Flu and covid shot - discussed     Rtc 3 months     Thank you for involving Infectious Disease in the care of this patient.     Dr. Chelo Jay  Division of Infectious Disease  [facility]    Assessment:  Rosalind Murphy is a 54 year old with PMH of RA on no meds- quiet for 12 years   Hypothyroidisim, sinonasal cancer diagnosed in 2022 s/p surgery and radiation in the first half of 2023.     Recurrent nasal obstruction, crusting and discharge since mid 2023/radiation: s/p multiple courses of abxs, gets better with it and worsens on stopping it. All cultures with MSSA. Latest nasal endoscopy with possible exposed bone of the nasal septum, ?osteomyelitis. Last MRI 5/2024 was not suggestive of osteo.    Repeat MRI 9/13/24 with no osteomyelitis. Taken to the OR on 9/27 with swollen mucosa and crusting, no exposed bone.     Suspect radiation has compromised mucosas regenerative capacity and therefore prone to infections with colonized bacteria. Will try doxy for 3 months and see how she does. Also oil nasal rinses can help.     -------------------------------------------------------------------------------------------------------------------  Interval History:  Last visit 8/14/24  Had MRI sinonasal on 9/13, no new findings since previous one, no osteomyelitis   Dr. Lou took her to the OR on 9/27  - no exposed bone was seen. Mucosa was very swollen and lot of  crusting which was removed  Dr. Lou contacted me and we started doxy around 2.5 weeks ago. In the beginning she got nauseous a couple of times. Ok now.   She is doing ok from a sinus standpoint, she has intermittent headaches - not too bad. Is using oil in nasal rinse for a month now  Follow up with Dr. Lou a few days ago showed the mucosa to be in a much better condition    Reason for consult / Chief complaint:   Consulted by Dr. Lorenzo Lou-Deangelo* for recurrent nasal infection     History of presenting illness:  Rosalind Murphy is a 54 year old with PMH of RA on no meds- quiet for 12 years   Hypothyroidisim, sinonasal cancer diagnosed in 2022.     2022 she saw PCP for what she thought was recurrent sinus infections   She was sent to ENT by PCP - Dec 2022 was determined to be sinonasal cancer   Jan 2023 had surgery for the tumor, had a second surgery in feb 2023 with neurosurgery due to extension   Subsequently had Radiation - maxilla, nose, face bilateral   Since then has had cycles of dryness and crustiness of the nose alternating with yellow discharge   Crusting occurs left side of the nose, mucus discharge is from both sides   Crust sometimes goes to throat and she coughs it up   She feels nasal obstruction and Frontal headache  Top part of the nose is tender to touch  For these she has been off and on abxs since May 2023 - usually oral abxs for 2 weeks (clindamycin, keflex, augmentin) and nasal rinse abx for a month at a time  Last abx was in May 2024 - bactrim for 2 weeks  She feels good for a couple of weeks after the oral abx (Abx is the only thing that clears it up), then the crust starts building up   Family has noticed a smell with the recent infection, she does not have a sense of smell.  Of note all nasal cultures have grown staph aureus     She has been seeing ENT, thought due to dryness and she has was nasal saline rinses everyday (Deion-Med), recently cut back to twice weekly. She has  "been on Ponaris oil nasal drops at bedtime everyday for the past year.     She saw ENT recently and cleaned it up   8/8 nasal endoscopy   \"The nasal endoscope was passed under endoscopic vision. There was abundant sinonasal crust that was removed today with suction, nasal forceps and curettes. Under the crust there is a thin layer of yellowish-greenish secretion. I did obtain cultures again today. The right side is difficult to access because of the very limited space due to fibrosis. Looking through the left side it appears to be that there is a piece of bone exposed at the septum. After completing all the debridement I also suction the left maxillary sinus. This maxillary sinus was full with secretion.\"        Patient Active Problem List   Diagnosis    Sinonasal undifferentiated carcinoma (H)    Nasal obstruction    Primary adenocarcinoma of nasal cavity (H)     RA, quiet for 12 years   Hypothyroidisim       Allergies:   Allergies   Allergen Reactions    Doxycycline Headache and Nausea     And Headaches      Hydroxychloroquine Headache and Tinnitus    Topiramate Tinnitus     Worsening Tinnitus      Scopolamine Other (See Comments)     Metal taste in mouth for days         Medications:  Current Outpatient Medications   Medication Sig Dispense Refill    acetaminophen (TYLENOL) 325 MG tablet Take 325-650 mg by mouth every 6 hours as needed for mild pain.      doxycycline hyclate (VIBRA-TABS) 100 MG tablet Take 1 tablet (100 mg) by mouth 2 times daily. 60 tablet 1    levothyroxine (SYNTHROID/LEVOTHROID) 75 MCG tablet Take 75 mcg by mouth daily      Misc Natural Product Nasal (PONARIS) SOLN Use 2-4 drops in each nostril before bedtime. 30 mL 11    ondansetron (ZOFRAN ODT) 4 MG ODT tab Take 1 tablet (4 mg) by mouth every 8 hours as needed for nausea 10 tablet 0    propranolol (INDERAL) 20 MG tablet Take 20 mg by mouth 2 times daily      rizatriptan (MAXALT) 10 MG tablet       spironolactone (ALDACTONE) 25 MG tablet Take " "25 mg by mouth every morning           Immunizations:  Immunization History   Administered Date(s) Administered    COVID-19 MONOVALENT 12+ (Pfizer) 02/04/2021, 02/25/2021, 11/03/2021         Examination:  Vital signs:   Ht 1.727 m (5' 8\")   Wt 66.7 kg (147 lb)   BMI 22.35 kg/m      Unable to examine due to virtual visit       Laboratory:  Hematology:  Recent Labs   Lab Test 02/03/23  0738 02/02/23  1423   WBC 10.2  --    HGB 12.2 13.3   HCT 37.3  --      --        Chemistry:  Last Comprehensive Metabolic Panel:  Lab Results   Component Value Date     02/03/2023    POTASSIUM 4.1 02/03/2023    CHLORIDE 104 02/03/2023    CO2 26 02/03/2023    ANIONGAP 10 02/03/2023    GLC 89 02/05/2023    BUN 8.7 02/03/2023    CR 0.7 08/16/2023    GFRESTIMATED >60 08/16/2023    NERY 8.6 02/03/2023       Microbiology:     5/26/23 ethmoid sinus swab MSSA  9/27/23 swab from maxillary sinus bilateral MSSA  11/9/23 swab from left nare MSSA  5/2/24 - right nasal mucosa MSSA  8/8/24 tissue from sinus MSSA  9/27/24 swab from sinus - MSSA  9/27/24 swab from nose - alternaria, cladosporium, staph lugdunensis pan S, MSSA R only to bactrim    Imaging:MRI sinonasal 9/13/24  1.  Extensive postoperative changes in the nasal cavity. Bony nasal  septum appears dehiscent in several areas. No definite nonenhancing  septal tissue or mucosa appreciated to suggest osteomyelitis. Overall,  the appearance is similar to prior MR 5/9/2024.  2.  Mucosal thickening in the residual paranasal sinuses, particularly  the right ethmoid air cells and left maxillary sinus. This appears  increased since 5/9/2024 with new fluid in the left maxillary sinus.  No obvious nodular or masslike lesion, although residual/recurrent  tumor is not entirely excluded.    MRI 5/9/24   Postsurgical changes of left sinonasal resection including left sided  medial antrostomy, middle turbinectomy, ethmoidectomy, and frontal  sinusostomy. There is moderate residual mucosal " thickening along the  margins of the surgical resection bed and within the left greater than  right maxillary sinuses, and within the right greater than left  ethmoid air cells. Continued opacification of the left sphenoid locule  with T1 hyperintense contents, consistent with proteinaceous debris.  Persistent dehiscence of the left cribriform plate with minimal  adjacent linear pachymeningeal enhancement, unchanged. No nodular  enhancement or abnormal signal of the adjacent brain parenchyma.    Impression:  Postsurgical sinonasal resection changes and mild to  moderate mucosal thickening without evidence of residual or recurrent  tumor.    MRI sinonasal/oral/parotid 11/2/23 with and without contrast   Postsurgical sinonasal resection changes including resection of the  left cribriform plate, bilateral maxillary antrostomies, and left  ethmoidectomy with mild to moderate mucosal thickening along the  resection site. No new nodular thickening or enhancement.    Impression: Postsurgical left sinonasal resection changes with  moderate mucosal thickening along the resection site and within the  paranasal sinuses. No evidence of residual or recurrent tumor.

## 2024-10-18 NOTE — LETTER
10/18/2024       RE: Rosalind Murphy  [address] [place] [facility] [address]     Dear Colleague,    Thank you for referring your patient, Rosalind Murphy, to the [facility]ECTI[facility] DISEASE [facility]IN[facility] [place] at [facility]. Please see a copy of my visit note below.    Virtual Visit Details  Type of service:  Video Visit   Originating Location (pt. Location): Home    Distant Location (provider location):  On site   Platform used for Video Visit: [employer]  Video time- 4.28 to 4.51 pm           [facility]ECTIOUS DISEASE [facility]IN[facility] [place]    [address] [address]  Phone: [phone]  Fax: [phone]      Today's Date: 10/18/2024      Recommendations:   Continue doxycycline 100 mg po bid. Will try for 3 months and go from there  To continue oil nasal rinses   Due for annual Flu and covid shot - discussed     Rtc 3 months     Thank you for involving Infectious Disease in the care of this patient.     Dr. Chelo Jay  Division of Infectious Disease  [facility]    Assessment:  Rosalind Murphy is a 54 year old with PMH of RA on no meds- quiet for 12 years   Hypothyroidisim, sinonasal cancer diagnosed in 2022 s/p surgery and radiation in the first half of 2023.     Recurrent nasal obstruction, crusting and discharge since mid 2023/radiation: s/p multiple courses of abxs, gets better with it and worsens on stopping it. All cultures with MSSA. Latest nasal endoscopy with possible exposed bone of the nasal septum, ?osteomyelitis. Last MRI 5/2024 was not suggestive of osteo.    Repeat MRI 9/13/24 with no osteomyelitis. Taken to the OR on 9/27 with swollen mucosa and crusting, no exposed bone.     Suspect radiation has compromised mucosas regenerative capacity and therefore prone to infections with colonized bacteria. Will try doxy for 3 months and see how she does. Also oil nasal rinses can help.      -------------------------------------------------------------------------------------------------------------------  Interval History:  Last visit 8/14/24  Had MRI sinonasal on 9/13, no new findings since previous one, no osteomyelitis   Dr. Lou took her to the OR on 9/27  - no exposed bone was seen. Mucosa was very swollen and lot of crusting which was removed  Dr. Lou contacted me and we started doxy around 2.5 weeks ago. In the beginning she got nauseous a couple of times. Ok now.   She is doing ok from a sinus standpoint, she has intermittent headaches - not too bad. Is using oil in nasal rinse for a month now  Follow up with Dr. Lou a few days ago showed the mucosa to be in a much better condition    Reason for consult / Chief complaint:   Consulted by Dr. Lorenzo Lou-Gra* for recurrent nasal infection     History of presenting illness:  Rosalind Murphy is a 54 year old with PMH of RA on no meds- quiet for 12 years   Hypothyroidisim, sinonasal cancer diagnosed in 2022.     2022 she saw PCP for what she thought was recurrent sinus infections   She was sent to ENT by PCP - Dec 2022 was determined to be sinonasal cancer   Jan 2023 had surgery for the tumor, had a second surgery in feb 2023 with neurosurgery due to extension   Subsequently had Radiation - maxilla, nose, face bilateral   Since then has had cycles of dryness and crustiness of the nose alternating with yellow discharge   Crusting occurs left side of the nose, mucus discharge is from both sides   Crust sometimes goes to throat and she coughs it up   She feels nasal obstruction and Frontal headache  Top part of the nose is tender to touch  For these she has been off and on abxs since May 2023 - usually oral abxs for 2 weeks (clindamycin, keflex, augmentin) and nasal rinse abx for a month at a time  Last abx was in May 2024 - bactrim for 2 weeks  She feels good for a couple of weeks after the oral abx (Abx is the only thing that  "clears it up), then the crust starts building up   Family has noticed a smell with the recent infection, she does not have a sense of smell.  Of note all nasal cultures have grown staph aureus     She has been seeing ENT, thought due to dryness and she has was nasal saline rinses everyday (Deion-Med), recently cut back to twice weekly. She has been on Ponaris oil nasal drops at bedtime everyday for the past year.     She saw ENT recently and cleaned it up   8/8 nasal endoscopy   \"The nasal endoscope was passed under endoscopic vision. There was abundant sinonasal crust that was removed today with suction, nasal forceps and curettes. Under the crust there is a thin layer of yellowish-greenish secretion. I did obtain cultures again today. The right side is difficult to access because of the very limited space due to fibrosis. Looking through the left side it appears to be that there is a piece of bone exposed at the septum. After completing all the debridement I also suction the left maxillary sinus. This maxillary sinus was full with secretion.\"        Patient Active Problem List   Diagnosis     Sinonasal undifferentiated carcinoma (H)     Nasal obstruction     Primary adenocarcinoma of nasal cavity (H)     RA, quiet for 12 years   Hypothyroidisim       Allergies:   Allergies   Allergen Reactions     Doxycycline Headache and Nausea     And Headaches       Hydroxychloroquine Headache and Tinnitus     Topiramate Tinnitus     Worsening Tinnitus       Scopolamine Other (See Comments)     Metal taste in mouth for days         Medications:  Current Outpatient Medications   Medication Sig Dispense Refill     acetaminophen (TYLENOL) 325 MG tablet Take 325-650 mg by mouth every 6 hours as needed for mild pain.       doxycycline hyclate (VIBRA-TABS) 100 MG tablet Take 1 tablet (100 mg) by mouth 2 times daily. 60 tablet 1     levothyroxine (SYNTHROID/LEVOTHROID) 75 MCG tablet Take 75 mcg by mouth daily       Misc Natural Product " "Nasal (PONARIS) SOLN Use 2-4 drops in each nostril before bedtime. 30 mL 11     ondansetron (ZOFRAN ODT) 4 MG ODT tab Take 1 tablet (4 mg) by mouth every 8 hours as needed for nausea 10 tablet 0     propranolol (INDERAL) 20 MG tablet Take 20 mg by mouth 2 times daily       rizatriptan (MAXALT) 10 MG tablet        spironolactone (ALDACTONE) 25 MG tablet Take 25 mg by mouth every morning           Immunizations:  Immunization History   Administered Date(s) Administered     COVID-19 MONOVALENT 12+ (Pfizer) 02/04/2021, 02/25/2021, 11/03/2021         Examination:  Vital signs:   Ht 1.727 m (5' 8\")   Wt 66.7 kg (147 lb)   BMI 22.35 kg/m      Unable to examine due to virtual visit       Laboratory:  Hematology:  Recent Labs   Lab Test 02/03/23  0738 02/02/23  1423   WBC 10.2  --    HGB 12.2 13.3   HCT 37.3  --      --        Chemistry:  Last Comprehensive Metabolic Panel:  Lab Results   Component Value Date     02/03/2023    POTASSIUM 4.1 02/03/2023    CHLORIDE 104 02/03/2023    CO2 26 02/03/2023    ANIONGAP 10 02/03/2023    GLC 89 02/05/2023    BUN 8.7 02/03/2023    CR 0.7 08/16/2023    GFRESTIMATED >60 08/16/2023    NERY 8.6 02/03/2023       Microbiology:     5/26/23 ethmoid sinus swab MSSA  9/27/23 swab from maxillary sinus bilateral MSSA  11/9/23 swab from left nare MSSA  5/2/24 - right nasal mucosa MSSA  8/8/24 tissue from sinus MSSA  9/27/24 swab from sinus - MSSA  9/27/24 swab from nose - alternaria, cladosporium, staph lugdunensis pan S, MSSA R only to bactrim    Imaging:MRI sinonasal 9/13/24  1.  Extensive postoperative changes in the nasal cavity. Bony nasal  septum appears dehiscent in several areas. No definite nonenhancing  septal tissue or mucosa appreciated to suggest osteomyelitis. Overall,  the appearance is similar to prior MR 5/9/2024.  2.  Mucosal thickening in the residual paranasal sinuses, particularly  the right ethmoid air cells and left maxillary sinus. This appears  increased since " 5/9/2024 with new fluid in the left maxillary sinus.  No obvious nodular or masslike lesion, although residual/recurrent  tumor is not entirely excluded.    MRI 5/9/24   Postsurgical changes of left sinonasal resection including left sided  medial antrostomy, middle turbinectomy, ethmoidectomy, and frontal  sinusostomy. There is moderate residual mucosal thickening along the  margins of the surgical resection bed and within the left greater than  right maxillary sinuses, and within the right greater than left  ethmoid air cells. Continued opacification of the left sphenoid locule  with T1 hyperintense contents, consistent with proteinaceous debris.  Persistent dehiscence of the left cribriform plate with minimal  adjacent linear pachymeningeal enhancement, unchanged. No nodular  enhancement or abnormal signal of the adjacent brain parenchyma.    Impression:  Postsurgical sinonasal resection changes and mild to  moderate mucosal thickening without evidence of residual or recurrent  tumor.    MRI sinonasal/oral/parotid 11/2/23 with and without contrast   Postsurgical sinonasal resection changes including resection of the  left cribriform plate, bilateral maxillary antrostomies, and left  ethmoidectomy with mild to moderate mucosal thickening along the  resection site. No new nodular thickening or enhancement.    Impression: Postsurgical left sinonasal resection changes with  moderate mucosal thickening along the resection site and within the  paranasal sinuses. No evidence of residual or recurrent tumor.                Again, thank you for allowing me to participate in the care of your patient.      Sincerely,    Chelo Jay MD

## 2024-10-18 NOTE — NURSING NOTE
Current patient location: [address] [place] [address]    Is the patient currently in the state of [place]? YES    Visit mode:VIDEO    If the visit is dropped, the patient can be reconnected by: VIDEO VISIT: Text to cell phone:   Telephone Information:   Mobile [phone]       Will anyone else be joining the visit? NO  (If patient encounters technical issues they should call [phone]    Are changes needed to the allergy or medication list? Pt stated no med changes    Are refills needed on medications prescribed by this physician? NO    Rooming Documentation:  Not applicable    Reason for visit: RECHECK    No other vitals to report per pt     Gely CASTELLANOSF

## 2024-10-25 LAB
BACTERIA SPEC CULT: ABNORMAL
BACTERIA SPEC CULT: ABNORMAL
BACTERIA SPEC CULT: NO GROWTH
BACTERIA SPEC CULT: NO GROWTH

## 2024-10-31 ENCOUNTER — OFFICE VISIT (OUTPATIENT)
Dept: OTOLARYNGOLOGY | Facility: CLINIC | Age: 54
End: 2024-10-31
Attending: OTOLARYNGOLOGY
Payer: COMMERCIAL

## 2024-10-31 ENCOUNTER — MYC MEDICAL ADVICE (OUTPATIENT)
Dept: OTOLARYNGOLOGY | Facility: CLINIC | Age: 54
End: 2024-10-31

## 2024-10-31 VITALS — HEIGHT: 68 IN | WEIGHT: 147 LBS | BODY MASS INDEX: 22.28 KG/M2

## 2024-10-31 DIAGNOSIS — C30.0 PRIMARY ADENOCARCINOMA OF NASAL CAVITY (H): Primary | ICD-10-CM

## 2024-10-31 PROCEDURE — 31237 NSL/SINS NDSC SURG BX POLYPC: CPT | Performed by: OTOLARYNGOLOGY

## 2024-10-31 ASSESSMENT — PAIN SCALES - GENERAL: PAINLEVEL_OUTOF10: MILD PAIN (3)

## 2024-10-31 NOTE — LETTER
10/31/2024       RE: Rosalind Murphy  [address] [facility] [address]     Dear Colleague,    Thank you for referring your patient, Rosalind Murphy, to the [facility] EAR NOSE AND THROAT CLINIC [place] [facility]. Please see a copy of my visit note below.    Diagnosis: Sinonasal ITAC    Procedure Date: 02/02/2023     PREOPERATIVE DIAGNOSES:    1.  Intestinal type sinonasal adenocarcinoma.  2.  Nasal obstruction.     PREOPERATIVE DIAGNOSES:    1.  Intestinal type sinonasal adenocarcinoma.  2.  Nasal obstruction.     PROCEDURE PERFORMED:     1.  Endoscopic endonasal transcribriform resection of anterior cranial fossa intradural tumor, left sinonasal adenocarcinoma.  2.  [place] of right-sided pedicled nasoseptal flap pedicled off the posterior septal branch of sphenopalatine artery.     SURGEON:  Germain Vyas MD     CO-SURGEON:  Abhi Tidwell MD     Completed radiation : 5/3/2023 in [place]     9/27/2024: Procedure: Transnasal endoscopic approach with excision of abundant sinonasal crust, close examination of the sinonasal cavity, lysis of right sinonasal synechia     History of Present Illness: 54-year-old female.  The patient is here today for follow-up regarding her last surgical procedure on September 27, 2024.  The patient states that the crusting has improved substantially.  She does not have any more postnasal drainage.  Her sinus congestions and headache has improved.  She continues to take doxycycline.  She continues to use the sinonasal irrigations.  She denies any systemic symptoms.    MEDICATIONS:     Current Outpatient Medications   Medication Sig Dispense Refill     acetaminophen (TYLENOL) 325 MG tablet Take 325-650 mg by mouth every 6 hours as needed for mild pain.       doxycycline hyclate (VIBRA-TABS) 100 MG tablet Take 1 tablet (100 mg) by mouth 2 times daily. 180 tablet 1     levothyroxine (SYNTHROID/LEVOTHROID) 75 MCG tablet  Take 75 mcg by mouth daily       Misc Natural Product Nasal (PONARIS) SOLN Use 2-4 drops in each nostril before bedtime. 30 mL 11     ondansetron (ZOFRAN ODT) 4 MG ODT tab Take 1 tablet (4 mg) by mouth every 8 hours as needed for nausea 10 tablet 0     propranolol (INDERAL) 20 MG tablet Take 20 mg by mouth 2 times daily       rizatriptan (MAXALT) 10 MG tablet        spironolactone (ALDACTONE) 25 MG tablet Take 25 mg by mouth every morning         ALLERGIES:    Allergies   Allergen Reactions     Doxycycline Headache and Nausea     And Headaches       Hydroxychloroquine Headache and Tinnitus     Topiramate Tinnitus     Worsening Tinnitus       Scopolamine Other (See Comments)     Metal taste in mouth for days         PAST MEDICAL HISTORY:   Past Medical History:   Diagnosis Date     History of hysterectomy 03/06/2015     Hypothyroidism      Motion sickness      PONV (postoperative nausea and vomiting)      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     Nasal endoscopy: Consent for nasal endoscopy was obtained.  I confirmed correctness of the procedure and identity of the patient.  Nasal endoscopy was indicated due to recent left transnasal endoscopic surgery.  The nose was topically decongested and anesthetized.  The nasal endoscope was passed under endoscopic vision.  The septum is in a much better position.  Now is not scarred down to the right lateral nasal wall.  There is much better airflow on the right.  On the left side of the septum  is slightly deviated to the right.  The sinonasal cavity is more healthy.  Minimal crusting.  The mucosa looks much improved.    IMPRESSION AND PLAN: 54-year-old female with diagnosis of sinonasal intestinal type adenocarcinoma status post surgery followed by radiation therapy.  She completed treatment on May 2023.  I took her to the operating room on September 2024 for lysis of right nasal synechiae.  During that surgical procedure multiple sinonasal cultures were obtained.  Please see results of cultures.  She is currently under the care of of infectious disease for her sinus infections.  She is currently on doxycycline.  Today's nasal endoscopy shows a lot of improvement.  From my standpoint my plan is to see her back in January 2025.      Lorenzo Hamilton MD, M.S.  Otolaryngology- Head & Neck Surgery  [phone]           Again, thank you for allowing me to participate in the care of your patient.      Sincerely,    Lorenzo Hamilton MD

## 2024-10-31 NOTE — PATIENT INSTRUCTIONS
You were seen in the ENT Clinic today by Dr. Hamilton. If you have any questions or concerns after your appointment, please contact us (see below)    Please return to clinic in January prior to ID appointment for follow up with Dr. Lou    How to Contact Us:  Send a Elysia message to your provider. Our team will respond to you via Elysia. Occasionally, we will need to call you to get further information.  For urgent matters (Monday-Friday), call the ENT Clinic: [phone] and speak with a call center team member - they will route your call appropriately.   If you'd like to speak directly with a nurse, please find our contact information below. We do our best to check voicemail frequently throughout the day, and will work to call you back within 1-2 days. For urgent matters, please use the general clinic phone numbers listed above.    Codi MORALES RN, BSN  RN Care Coordinator, ENT Clinic  [facility]  Direct: [phone]

## 2024-11-01 ENCOUNTER — OFFICE VISIT (OUTPATIENT)
Dept: RADIATION ONCOLOGY | Facility: CLINIC | Age: 54
End: 2024-11-01
Payer: COMMERCIAL

## 2024-11-01 VITALS
SYSTOLIC BLOOD PRESSURE: 91 MMHG | RESPIRATION RATE: 16 BRPM | WEIGHT: 152 LBS | HEART RATE: 68 BPM | OXYGEN SATURATION: 99 % | BODY MASS INDEX: 23.11 KG/M2 | DIASTOLIC BLOOD PRESSURE: 54 MMHG | TEMPERATURE: 97 F

## 2024-11-01 DIAGNOSIS — C30.0 PRIMARY ADENOCARCINOMA OF NASAL CAVITY (H): Primary | ICD-10-CM

## 2024-11-01 PROCEDURE — 99214 OFFICE O/P EST MOD 30 MIN: CPT | Performed by: SURGERY

## 2024-11-01 ASSESSMENT — PAIN SCALES - GENERAL: PAINLEVEL_OUTOF10: NO PAIN (0)

## 2024-11-01 NOTE — PROGRESS NOTES
Department of Radiation Oncology  [facility]    Health: Cancer Center  [facility]  [address] [place] [phone]       Radiation Oncology Follow-up Visit  2024      Rosalind Murphy  MRN: [id number]   : 1970     DIAGNOSIS / ID: Ms. Murphy is a 54 year old female presenting with a newly diagnosed left nasal cavity moderately differentiated adenocarcinoma status post resection, with pathology demonstrating a posterior nasal septal mass with extension into the left olfactory groove adjacent to the cribriform plate, with negative margins, most consistent with pT3cN0. Recommendation is for 60Gy/30fx without chemotherapy. We did discussed the role of elective david irradiation, particularly given location of tumor and stage     INTENT OF RADIOTHERAPY: Adjuvant     CONCURRENT SYSTEMIC THERAPY: No              SITE OF TREATMENT: Sinonasal, elective lymph nodes     DATES  OF TREATMENT: 3/23/2023 to 5/3/2023    TOTAL DOSE OF TREATMENT / FRACTIONS: 60 Gy/30 fractions    INTERVAL SINCE COMPLETION OF RADIATION THERAPY: 1.5 years    SUBJECTIVE:     She has recently had a PET CT scan and MRI on 2023.  MRI did not demonstrate any evidence of local regional disease or any sign of recurrence.  PET CT scan also noticed posttreatment changes without any obvious sign of a recurrence.  There is evidence of subcentimeter lymph nodes which have been relatively stable and with low SUV uptake, favored to be reactive. There was a new 1.3 groundglass opacity in the left lower lobe, favored to be inflammatory.  Otherwise there is no evidence of any distant metastatic disease.    Recent MRI obtained on 2023 demonstrates no signs of any local regional recurrence.  CT scan of the chest at that time demonstrated some 2 mm endobronchial nodules.  Repeat scan of the chest obtained on 3/7/2024 was negative for any distant metastatic disease and also  demonstrated resolution of nodules.    Interval history:    Recent MRI on 9/13/2024 does not demonstrate any obvious recurrence.  She also follows with Dr. Lou, with recent rigid endoscopy not demonstrating any recurrence. She did have endoscopic nasal surgery on 9/27/24 for surveillance and lysis of synechia. She is also following with ID for sinonasal infection, using irrigations and antibiotics.    She still endorses loss of smell and and smell. No focal neurologic changes.  She denies any lymphadenopathy, dysphagia, trismus, pain.    PHYSICAL EXAM:  BP 91/54 (BP Location: Left arm, Patient Position: Chair, Cuff Size: Adult Regular)   Pulse 68   Temp 97  F (36.1  C) (Oral)   Resp 16   Wt 68.9 kg (152 lb)   SpO2 99%   BMI 23.11 kg/m    Gen: Alert, in NAD  Eyes: PERRL, EOMI, sclera anicteric  HENT     Head: NC/AT     Ears: No external auricular lesions     Nose/sinus: No rhinorrhea or epistaxis     Oral Cavity/Oropharynx: MMM  Neck: Supple, full ROM, no LAD  Pulm: No wheezing, stridor or respiratory distress  CV: Well-perfused, no cyanosis, no pedal edema  Abdominal: Soft, nontender, nondistended, no hepatomegaly  Back: No step-offs or pain to palpation along the thoracolumbar spine, no CVA tenderness  Rectal/: Deferred  Musculoskeletal: Normal bulk and tone   Skin: Normal color and turgor  Neurologic: A/Ox3, CN II-XII intact  Psychiatric: Appropriate mood and affect    LABS AND IMAGING:  per HPI reviewed and in agreement.    I have reviewed  notes     IMPRESSION: Overall, patient is doing well from a radiation acute toxicity perspective.Recent Imaging does not demonstrate any local, regional, or distance recurrence.     PLAN:   1.  Follow-up with Dr. Tomlin every 2-3 months    2. Given my job relocation, I have discussed options with patient to have radiation follow up with our HN PA downtown location. However, patient preference is to continue with HN surgery and radiation as needed basis,  which is also reasonable given she sees Dr. Tomlin quite frequently for infection/surveillance.     Jules Pereira M.D.  Department of Radiation Oncology  [facility]     A total of 30 minutes were spent on this visit, including preparation for this visit, face to face with patient, and medical decision making. Medical decision-making included consideration and possible diagnosis, management options, complex record review, review of diagnostic tests, consideration and discussion of significant complications based up medical history/comorbidities, and discussion with providers involved in care of the patient.

## 2024-11-01 NOTE — NURSING NOTE
"Oncology Rooming Note    November 1, 2024 12:43 PM   Rosalind Murphy is a 54 year old female who presents for:    Chief Complaint   Patient presents with    Cancer     Radiation Oncology Return Visit with Dr. Jules Pereira     Initial Vitals: BP 91/54 (BP Location: Left arm, Patient Position: Chair, Cuff Size: Adult Regular)   Pulse 68   Temp 97  F (36.1  C) (Oral)   Resp 16   Wt 68.9 kg (152 lb)   SpO2 99%   BMI 23.11 kg/m   Estimated body mass index is 23.11 kg/m  as calculated from the following:    Height as of 10/31/24: 1.727 m (5' 8\").    Weight as of this encounter: 68.9 kg (152 lb). Body surface area is 1.82 meters squared.  No Pain (0) Comment: Data Unavailable   No LMP recorded. Patient has had a hysterectomy.  Allergies reviewed: Yes  Medications reviewed: Yes    Medications: Medication refills not needed today.  Pharmacy name entered into [facility]:    [id number] [place], [place] - [address] [place] [address]  [facility] [place], [place] - [address] [place] [address] - [address] [place] [address] [phone]    Frailty Screening:   Is the patient here for a new oncology consult visit in cancer care? 2. No      Clinical concerns: Patient here today for radiation oncology return visit with spouse Brad. Denies any concerns.  Dr. Jules Pereira was notified.      Ash Long RN              "

## 2024-11-01 NOTE — PROGRESS NOTES
Diagnosis: Sinonasal ITAC    Procedure Date: 02/02/2023     PREOPERATIVE DIAGNOSES:    1.  Intestinal type sinonasal adenocarcinoma.  2.  Nasal obstruction.     PREOPERATIVE DIAGNOSES:    1.  Intestinal type sinonasal adenocarcinoma.  2.  Nasal obstruction.     PROCEDURE PERFORMED:     1.  Endoscopic endonasal transcribriform resection of anterior cranial fossa intradural tumor, left sinonasal adenocarcinoma.  2.  [place] of right-sided pedicled nasoseptal flap pedicled off the posterior septal branch of sphenopalatine artery.     SURGEON:  Germain Vyas MD     CO-SURGEON:  Abhi Tidwell MD     Completed radiation : 5/3/2023 in [place]     9/27/2024: Procedure: Transnasal endoscopic approach with excision of abundant sinonasal crust, close examination of the sinonasal cavity, lysis of right sinonasal synechia     History of Present Illness: 54-year-old female.  The patient is here today for follow-up regarding her last surgical procedure on September 27, 2024.  The patient states that the crusting has improved substantially.  She does not have any more postnasal drainage.  Her sinus congestions and headache has improved.  She continues to take doxycycline.  She continues to use the sinonasal irrigations.  She denies any systemic symptoms.    MEDICATIONS:     Current Outpatient Medications   Medication Sig Dispense Refill    acetaminophen (TYLENOL) 325 MG tablet Take 325-650 mg by mouth every 6 hours as needed for mild pain.      doxycycline hyclate (VIBRA-TABS) 100 MG tablet Take 1 tablet (100 mg) by mouth 2 times daily. 180 tablet 1    levothyroxine (SYNTHROID/LEVOTHROID) 75 MCG tablet Take 75 mcg by mouth daily      Misc Natural Product Nasal (PONARIS) SOLN Use 2-4 drops in each nostril before bedtime. 30 mL 11    ondansetron (ZOFRAN ODT) 4 MG ODT tab Take 1 tablet (4 mg) by mouth every 8 hours as needed for nausea 10 tablet 0    propranolol (INDERAL) 20 MG tablet Take 20 mg by mouth 2 times daily       rizatriptan (MAXALT) 10 MG tablet       spironolactone (ALDACTONE) 25 MG tablet Take 25 mg by mouth every morning         ALLERGIES:    Allergies   Allergen Reactions    Doxycycline Headache and Nausea     And Headaches      Hydroxychloroquine Headache and Tinnitus    Topiramate Tinnitus     Worsening Tinnitus      Scopolamine Other (See Comments)     Metal taste in mouth for days         PAST MEDICAL HISTORY:   Past Medical History:   Diagnosis Date    History of hysterectomy 03/06/2015    Hypothyroidism     Motion sickness     PONV (postoperative nausea and vomiting)     Primary adenocarcinoma of nasal cavity (H) 12/23/2022    S/P radiation therapy     6,000 cGy to sinonasal completed on 5/3/2023 [facility]        FAMILY HISTORY:    Family History   Problem Relation Age of Onset    Breast Cancer Mother     Breast Cancer Maternal Aunt     Breast Cancer Maternal Aunt     Lung Cancer Maternal Aunt     Bladder Cancer Maternal Aunt     Cancer Maternal Uncle         Eye       REVIEW OF SYSTEMS:  12 point ROS was negative other than the symptoms noted above in the HPI.     Nasal endoscopy: Consent for nasal endoscopy was obtained.  I confirmed correctness of the procedure and identity of the patient.  Nasal endoscopy was indicated due to recent left transnasal endoscopic surgery.  The nose was topically decongested and anesthetized.  The nasal endoscope was passed under endoscopic vision.  The septum is in a much better position.  Now is not scarred down to the right lateral nasal wall.  There is much better airflow on the right.  On the left side of the septum is slightly deviated to the right.  The sinonasal cavity is more healthy.  Minimal crusting.  The mucosa looks much improved.    IMPRESSION AND PLAN: 54-year-old female with diagnosis of sinonasal intestinal type adenocarcinoma status post surgery followed by radiation therapy.  She completed treatment on May 2023.  I took her to the operating  room on September 2024 for lysis of right nasal synechiae.  During that surgical procedure multiple sinonasal cultures were obtained.  Please see results of cultures.  She is currently under the care of of infectious disease for her sinus infections.  She is currently on doxycycline.  Today's nasal endoscopy shows a lot of improvement.  From my standpoint my plan is to see her back in January 2025.      Lorenzo Hamliton MD, M.S.  Otolaryngology- Head & Neck Surgery  [phone]

## 2024-11-01 NOTE — LETTER
2024      Rosalind Murphy  [address] [facility] [address]      Dear Colleague,    Thank you for referring your patient, Rosalind Murphy, to the [facility] RADIATION ONCOLOGY [place]. Please see a copy of my visit note below.         Department of Radiation Oncology  [facility]: Cancer Center  [facility]  [address] [address] [place] [phone]       Radiation Oncology Follow-up Visit  2024      Rosalind Murphy  MRN: [id number]   : 1970     DIAGNOSIS / ID: Ms. Murphy is a 54 year old female presenting with a newly diagnosed left nasal cavity moderately differentiated adenocarcinoma status post resection, with pathology demonstrating a posterior nasal septal mass with extension into the left olfactory groove adjacent to the cribriform plate, with negative margins, most consistent with pT3cN0. Recommendation is for 60Gy/30fx without chemotherapy. We did discussed the role of elective david irradiation, particularly given location of tumor and stage     INTENT OF RADIOTHERAPY: Adjuvant     CONCURRENT SYSTEMIC THERAPY: No              SITE OF TREATMENT: Sinonasal, elective lymph nodes     DATES  OF TREATMENT: 3/23/2023 to 5/3/2023    TOTAL DOSE OF TREATMENT / FRACTIONS: 60 Gy/30 fractions    INTERVAL SINCE COMPLETION OF RADIATION THERAPY: 1.5 years    SUBJECTIVE:     She has recently had a PET CT scan and MRI on 2023.  MRI did not demonstrate any evidence of local regional disease or any sign of recurrence.  PET CT scan also noticed posttreatment changes without any obvious sign of a recurrence.  There is evidence of subcentimeter lymph nodes which have been relatively stable and with low SUV uptake, favored to be reactive. There was a new 1.3 groundglass opacity in the left lower lobe, favored to be inflammatory.  Otherwise there is no evidence of any distant metastatic disease.    Recent MRI obtained on 2023  demonstrates no signs of any local regional recurrence.  CT scan of the chest at that time demonstrated some 2 mm endobronchial nodules.  Repeat scan of the chest obtained on 3/7/2024 was negative for any distant metastatic disease and also demonstrated resolution of nodules.    Interval history:    Recent MRI on 9/13/2024 does not demonstrate any obvious recurrence.  She also follows with Dr. Lou, with recent rigid endoscopy not demonstrating any recurrence. She did have endoscopic nasal surgery on 9/27/24 for surveillance and lysis of synechia. She is also following with ID for sinonasal infection, using irrigations and antibiotics.    She still endorses loss of smell and and smell. No focal neurologic changes.  She denies any lymphadenopathy, dysphagia, trismus, pain.    PHYSICAL EXAM:  BP 91/54 (BP Location: Left arm, Patient Position: Chair, Cuff Size: Adult Regular)   Pulse 68   Temp 97  F (36.1  C) (Oral)   Resp 16   Wt 68.9 kg (152 lb)   SpO2 99%   BMI 23.11 kg/m    Gen: Alert, in NAD  Eyes: PERRL, EOMI, sclera anicteric  HENT     Head: NC/AT     Ears: No external auricular lesions     Nose/sinus: No rhinorrhea or epistaxis     Oral Cavity/Oropharynx: MMM  Neck: Supple, full ROM, no LAD  Pulm: No wheezing, stridor or respiratory distress  CV: Well-perfused, no cyanosis, no pedal edema  Abdominal: Soft, nontender, nondistended, no hepatomegaly  Back: No step-offs or pain to palpation along the thoracolumbar spine, no CVA tenderness  Rectal/: Deferred  Musculoskeletal: Normal bulk and tone   Skin: Normal color and turgor  Neurologic: A/Ox3, CN II-XII intact  Psychiatric: Appropriate mood and affect    LABS AND IMAGING:  per HPI reviewed and in agreement.    I have reviewed  notes     IMPRESSION: Overall, patient is doing well from a radiation acute toxicity perspective.Recent Imaging does not demonstrate any local, regional, or distance recurrence.     PLAN:   1.  Follow-up with   N[place] every 2-3 months    2. Given my job relocation, I have discussed options with patient to have radiation follow up with our HN PA downtown location. However, patient preference is to continue with HN surgery and radiation as needed basis, which is also reasonable given she sees Dr. Tomlin quite frequently for infection/surveillance.     Jules Pereira M.D.  Department of Radiation Oncology  [facility]     A total of 30 minutes were spent on this visit, including preparation for this visit, face to face with patient, and medical decision making. Medical decision-making included consideration and possible diagnosis, management options, complex record review, review of diagnostic tests, consideration and discussion of significant complications based up medical history/comorbidities, and discussion with providers involved in care of the patient.       Again, thank you for allowing me to participate in the care of your patient.        Sincerely,        Jules Pereira MD

## 2024-11-01 NOTE — PATIENT INSTRUCTIONS
Please contact [facility] RN with questions or concerns following today's appointment.    If you are a patient of Dr. Pereira call: [phone]   If you are a patient of Dr. Russo call: [phone]    Please feel free to leave a detailed message if your call is not answered.    If your call is not received before 3:00 PM, it may not be returned until the following business day.    If you are receiving radiation treatment and need assistance after 3:00 PM or on the weekends, please call [phone] and ask to speak to the radiation oncologist on-call.    Thank you!    [facility] Radiation Oncology Nursing

## 2025-02-12 ENCOUNTER — VIRTUAL VISIT (OUTPATIENT)
Dept: INFECTIOUS DISEASES | Facility: CLINIC | Age: 55
End: 2025-02-12
Attending: STUDENT IN AN ORGANIZED HEALTH CARE EDUCATION/TRAINING PROGRAM
Payer: COMMERCIAL

## 2025-02-12 VITALS — WEIGHT: 150 LBS | BODY MASS INDEX: 22.73 KG/M2 | HEIGHT: 68 IN

## 2025-02-12 DIAGNOSIS — T66.XXXS ADVERSE EFFECT OF RADIATION, SEQUELA: ICD-10-CM

## 2025-02-12 DIAGNOSIS — J34.89 NASAL CRUSTING: Primary | ICD-10-CM

## 2025-02-12 ASSESSMENT — PAIN SCALES - GENERAL: PAINLEVEL_OUTOF10: NO PAIN (0)

## 2025-02-12 NOTE — NURSING NOTE
Current patient location: [address] [place] [phone]    Is the patient currently in the state of [place]? YES    Visit mode: VIDEO    If the visit is dropped, the patient can be reconnected by:VIDEO VISIT: Text to cell phone:   Telephone Information:   Mobile [phone]    and VIDEO VISIT: Send to e-mail at: [email or web address]    Will anyone else be joining the visit? NO  (If patient encounters technical issues they should call [phone])    Are changes needed to the allergy or medication list? No    Patient denies any changes since echeck-in completion and states all information entered during echeck-in remains accurate.    Are refills needed on medications prescribed by this physician? NO    Rooming Documentation:  Questionnaire(s) completed    No other vitals to report today    Reason for visit: ROBE Gaspar MA VVF

## 2025-02-12 NOTE — LETTER
2/12/2025       RE: Rosalind Murphy  [address] [facility] [address]     Dear Colleague,    Thank you for referring your patient, Rosalind Murphy, to the [facility] [place] at [facility]. Please see a copy of my visit note below.      Virtual Visit Details  Type of service:  Video Visit   Originating Location (pt. Location): Home    Distant Location (provider location):  Off site   Platform used for Video Visit: [employer]  Video time- 4.44 to 5 pm   Total time including chart review, care-coordination and documentation time on the date of encounter - 30 mins            [facility] [place]    [address] [facility] [address]  Phone: [phone]  Fax: [phone]      Today's Date: 02/12/2025      Recommendations:   -Add Sesame oil nasal spray in the middle of the day in addition to the coconut oil nasal rinse bid   -Continue to be off doxy   -If she feels infection is starting up, to restart doxy and try for 7-10 days till the symptoms return to baseline   -Due for annual Flu and covid shot - discussed previously     RTC 3 months    Thank you for involving Infectious Disease in the care of this patient.     Dr. Chelo Jay  Division of Infectious Disease  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     Recurrent nasal obstruction, crusting and discharge since mid 2023/radiation: s/p multiple courses of abxs, gets better with it and worsens on stopping it. All cultures with MSSA. Latest nasal endoscopy with possible exposed bone of the nasal septum, ?osteomyelitis. Last MRI 5/2024 was not suggestive of osteo.    Repeat MRI 9/13/24 with no osteomyelitis. Taken to the OR on 9/27 with swollen mucosa and  crusting, no exposed bone.     Suspect radiation has compromised mucosa and therefore prone to infections with colonized bacteria. Tried doxy for 3 months (Oct 2024 to Jan 2025) with oil nasal rinses - it helped improve the condition significantly. No recurrence of symptoms 2.5 weeks after stopping doxy. Will add oil spray to the bid oil nasal rinse and monitor.     -------------------------------------------------------------------------------------------------------------------  Interval Events:  Last visit 10/18/24  Tried doxy for 3 months, Stopped doxy 2.5 weeks ago after the 3 month course   Overall symptoms are a lot better. She has some crusting but nothing like before. She does not have mucus or congestion like before. No symptom recurrence after stopping doxy   Saw Dr. Lou in ENT on 1/16 while on doxy, 3 months after prior visit. This was the first time she could go that long without seeing him, otherwise usually has to see him monthly. Quite a bit of crusting was present on 1/16 exam but much better than before     Doxy was causing quite a bit of nausea which was better if taken with food. She was also having headaches with the doxy     She has been using Liquid coconut oil with nasal rinse twice daily   Ponaris night time   Sesame oil nasal spray twice a week     Next appt with Dr. Lou is in April     Interval History:  Last visit 8/14/24  Had MRI sinonasal on 9/13, no new findings since previous one, no osteomyelitis   Dr. Lou took her to the OR on 9/27  - no exposed bone was seen. Mucosa was very swollen and lot of crusting which was removed  Dr. Lou contacted me and we started doxy around 2.5 weeks ago. In the beginning she got nauseous a couple of times. Ok now.   She is doing ok from a sinus standpoint, she has intermittent headaches - not too bad. Is using oil in nasal rinse for a month now  Follow up with Dr. Lou a few days ago showed the mucosa to be in a much better  "condition    Reason for consult / Chief complaint:   Consulted by Dr. Lorenzo Lou-Deangelo* for recurrent nasal infection     History of presenting illness:  Rosalind Murphy is a 54 year old with PMH of RA on no meds- quiet for 12 years   Hypothyroidisim, sinonasal cancer diagnosed in 2022.     2022 she saw PCP for what she thought was recurrent sinus infections   She was sent to ENT by PCP - Dec 2022 was determined to be sinonasal cancer   Jan 2023 had surgery for the tumor, had a second surgery in feb 2023 with neurosurgery due to extension   Subsequently had Radiation - maxilla, nose, face bilateral   Since then has had cycles of dryness and crustiness of the nose alternating with yellow discharge   Crusting occurs left side of the nose, mucus discharge is from both sides   Crust sometimes goes to throat and she coughs it up   She feels nasal obstruction and Frontal headache  Top part of the nose is tender to touch  For these she has been off and on abxs since May 2023 - usually oral abxs for 2 weeks (clindamycin, keflex, augmentin) and nasal rinse abx for a month at a time  Last abx was in May 2024 - bactrim for 2 weeks  She feels good for a couple of weeks after the oral abx (Abx is the only thing that clears it up), then the crust starts building up   Family has noticed a smell with the recent infection, she does not have a sense of smell.  Of note all nasal cultures have grown staph aureus     She has been seeing ENT, thought due to dryness and she has was nasal saline rinses everyday (Deion-Med), recently cut back to twice weekly. She has been on Ponaris oil nasal drops at bedtime everyday for the past year.     She saw ENT recently and cleaned it up   8/8 nasal endoscopy   \"The nasal endoscope was passed under endoscopic vision. There was abundant sinonasal crust that was removed today with suction, nasal forceps and curettes. Under the crust there is a thin layer of yellowish-greenish secretion. I did " "obtain cultures again today. The right side is difficult to access because of the very limited space due to fibrosis. Looking through the left side it appears to be that there is a piece of bone exposed at the septum. After completing all the debridement I also suction the left maxillary sinus. This maxillary sinus was full with secretion.\"        Patient Active Problem List   Diagnosis     Sinonasal undifferentiated carcinoma (H)     Nasal obstruction     Primary adenocarcinoma of nasal cavity (H)     RA, quiet for 12 years   Hypothyroidisim       Allergies:   Allergies   Allergen Reactions     Doxycycline Headache and Nausea     And Headaches       Hydroxychloroquine Headache and Tinnitus     Topiramate Tinnitus     Worsening Tinnitus       Scopolamine Other (See Comments)     Metal taste in mouth for days         Medications:  Current Outpatient Medications   Medication Sig Dispense Refill     acetaminophen (TYLENOL) 325 MG tablet Take 325-650 mg by mouth every 6 hours as needed for mild pain.       doxycycline hyclate (VIBRA-TABS) 100 MG tablet Take 1 tablet (100 mg) by mouth 2 times daily. 180 tablet 1     levothyroxine (SYNTHROID/LEVOTHROID) 75 MCG tablet Take 75 mcg by mouth daily (Patient not taking: Reported on 1/16/2025)       Misc Natural Product Nasal (PONARIS) SOLN Use 2-4 drops in each nostril before bedtime. 30 mL 11     ondansetron (ZOFRAN ODT) 4 MG ODT tab Take 1 tablet (4 mg) by mouth every 8 hours as needed for nausea 10 tablet 0     propranolol (INDERAL) 20 MG tablet Take 20 mg by mouth 2 times daily       rizatriptan (MAXALT) 10 MG tablet        spironolactone (ALDACTONE) 25 MG tablet Take 25 mg by mouth every morning           Immunizations:  Immunization History   Administered Date(s) Administered     COVID-19 MONOVALENT 12+ (Pfizer) 02/04/2021, 02/25/2021, 11/03/2021         Examination:  Vital signs:   Ht 1.727 m (5' 8\")   Wt 68 kg (150 lb)   BMI 22.81 kg/m      Unable to examine due to " virtual visit       Laboratory:  Hematology:  Recent Labs   Lab Test 02/03/23  0738 02/02/23  1423   WBC 10.2  --    HGB 12.2 13.3   HCT 37.3  --      --        Chemistry:  Last Comprehensive Metabolic Panel:  Lab Results   Component Value Date     02/03/2023    POTASSIUM 4.1 02/03/2023    CHLORIDE 104 02/03/2023    CO2 26 02/03/2023    ANIONGAP 10 02/03/2023    GLC 89 02/05/2023    BUN 8.7 02/03/2023    CR 0.7 08/16/2023    GFRESTIMATED >60 08/16/2023    NERY 8.6 02/03/2023       Microbiology:     5/26/23 ethmoid sinus swab MSSA  9/27/23 swab from maxillary sinus bilateral MSSA  11/9/23 swab from left nare MSSA  5/2/24 - right nasal mucosa MSSA  8/8/24 tissue from sinus MSSA  9/27/24 swab from sinus - MSSA  9/27/24 swab from nose - alternaria, cladosporium, staph lugdunensis pan S, MSSA R only to bactrim    Imaging:MRI sinonasal 9/13/24  1.  Extensive postoperative changes in the nasal cavity. Bony nasal  septum appears dehiscent in several areas. No definite nonenhancing  septal tissue or mucosa appreciated to suggest osteomyelitis. Overall,  the appearance is similar to prior MR 5/9/2024.  2.  Mucosal thickening in the residual paranasal sinuses, particularly  the right ethmoid air cells and left maxillary sinus. This appears  increased since 5/9/2024 with new fluid in the left maxillary sinus.  No obvious nodular or masslike lesion, although residual/recurrent  tumor is not entirely excluded.    MRI 5/9/24   Postsurgical changes of left sinonasal resection including left sided  medial antrostomy, middle turbinectomy, ethmoidectomy, and frontal  sinusostomy. There is moderate residual mucosal thickening along the  margins of the surgical resection bed and within the left greater than  right maxillary sinuses, and within the right greater than left  ethmoid air cells. Continued opacification of the left sphenoid locule  with T1 hyperintense contents, consistent with proteinaceous debris.  Persistent  dehiscence of the left cribriform plate with minimal  adjacent linear pachymeningeal enhancement, unchanged. No nodular  enhancement or abnormal signal of the adjacent brain parenchyma.    Impression:  Postsurgical sinonasal resection changes and mild to  moderate mucosal thickening without evidence of residual or recurrent  tumor.    MRI sinonasal/oral/parotid 11/2/23 with and without contrast   Postsurgical sinonasal resection changes including resection of the  left cribriform plate, bilateral maxillary antrostomies, and left  ethmoidectomy with mild to moderate mucosal thickening along the  resection site. No new nodular thickening or enhancement.    Impression: Postsurgical left sinonasal resection changes with  moderate mucosal thickening along the resection site and within the  paranasal sinuses. No evidence of residual or recurrent tumor.                Again, thank you for allowing me to participate in the care of your patient.      Sincerely,    Chelo Jay MD

## 2025-02-12 NOTE — PROGRESS NOTES
Virtual Visit Details  Type of service:  Video Visit   Originating Location (pt. Location): Home    Distant Location (provider location):  Off site   Platform used for Video Visit: [employer]  Video time- 4.44 to 5 pm   Total time including chart review, care-coordination and documentation time on the date of encounter - 30 mins            [facility] [address] [address]  Phone: [phone]  Fax: [phone]      Today's Date: 02/12/2025      Recommendations:   -Add Sesame oil nasal spray in the middle of the day in addition to the coconut oil nasal rinse bid   -Continue to be off doxy   -If she feels infection is starting up, to restart doxy and try for 7-10 days till the symptoms return to baseline   -Due for annual Flu and covid shot - discussed previously     RTC 3 months    Thank you for involving Infectious Disease in the care of this patient.     Dr. Chelo Jay  Division of Infectious Disease  [facility]    Assessment:  Rosalind Murphy is a 54 year old with PMH of RA on no meds- quiet for 12 years   Hypothyroidisim, sinonasal cancer diagnosed in 2022 s/p surgery and radiation in the first half of 2023.     Recurrent nasal obstruction, crusting and discharge since mid 2023/radiation: s/p multiple courses of abxs, gets better with it and worsens on stopping it. All cultures with MSSA. Latest nasal endoscopy with possible exposed bone of the nasal septum, ?osteomyelitis. Last MRI 5/2024 was not suggestive of osteo.    Repeat MRI 9/13/24 with no osteomyelitis. Taken to the OR on 9/27 with swollen mucosa and crusting, no exposed bone.     Suspect radiation has compromised mucosa and therefore prone to infections with colonized bacteria. Tried doxy for 3 months (Oct 2024 to Jan 2025) with oil nasal rinses - it helped improve the condition significantly. No recurrence of symptoms 2.5 weeks after stopping doxy. Will add oil  spray to the bid oil nasal rinse and monitor.     -------------------------------------------------------------------------------------------------------------------  Interval Events:  Last visit 10/18/24  Tried doxy for 3 months, Stopped doxy 2.5 weeks ago after the 3 month course   Overall symptoms are a lot better. She has some crusting but nothing like before. She does not have mucus or congestion like before. No symptom recurrence after stopping doxy   Saw Dr. Lou in ENT on 1/16 while on doxy, 3 months after prior visit. This was the first time she could go that long without seeing him, otherwise usually has to see him monthly. Quite a bit of crusting was present on 1/16 exam but much better than before     Doxy was causing quite a bit of nausea which was better if taken with food. She was also having headaches with the doxy     She has been using Liquid coconut oil with nasal rinse twice daily   Ponaris night time   Sesame oil nasal spray twice a week     Next appt with Dr. Lou is in April     Interval History:  Last visit 8/14/24  Had MRI sinonasal on 9/13, no new findings since previous one, no osteomyelitis   Dr. Lou took her to the OR on 9/27  - no exposed bone was seen. Mucosa was very swollen and lot of crusting which was removed  Dr. Lou contacted me and we started doxy around 2.5 weeks ago. In the beginning she got nauseous a couple of times. Ok now.   She is doing ok from a sinus standpoint, she has intermittent headaches - not too bad. Is using oil in nasal rinse for a month now  Follow up with Dr. Lou a few days ago showed the mucosa to be in a much better condition    Reason for consult / Chief complaint:   Consulted by Dr. Lorenzo Lou-Gra* for recurrent nasal infection     History of presenting illness:  Rosalind Murphy is a 54 year old with PMH of RA on no meds- quiet for 12 years   Hypothyroidisim, sinonasal cancer diagnosed in 2022.     2022 she saw PCP for what she  "thought was recurrent sinus infections   She was sent to ENT by PCP - Dec 2022 was determined to be sinonasal cancer   Jan 2023 had surgery for the tumor, had a second surgery in feb 2023 with neurosurgery due to extension   Subsequently had Radiation - maxilla, nose, face bilateral   Since then has had cycles of dryness and crustiness of the nose alternating with yellow discharge   Crusting occurs left side of the nose, mucus discharge is from both sides   Crust sometimes goes to throat and she coughs it up   She feels nasal obstruction and Frontal headache  Top part of the nose is tender to touch  For these she has been off and on abxs since May 2023 - usually oral abxs for 2 weeks (clindamycin, keflex, augmentin) and nasal rinse abx for a month at a time  Last abx was in May 2024 - bactrim for 2 weeks  She feels good for a couple of weeks after the oral abx (Abx is the only thing that clears it up), then the crust starts building up   Family has noticed a smell with the recent infection, she does not have a sense of smell.  Of note all nasal cultures have grown staph aureus     She has been seeing ENT, thought due to dryness and she has was nasal saline rinses everyday (Deion-Med), recently cut back to twice weekly. She has been on Ponaris oil nasal drops at bedtime everyday for the past year.     She saw ENT recently and cleaned it up   8/8 nasal endoscopy   \"The nasal endoscope was passed under endoscopic vision. There was abundant sinonasal crust that was removed today with suction, nasal forceps and curettes. Under the crust there is a thin layer of yellowish-greenish secretion. I did obtain cultures again today. The right side is difficult to access because of the very limited space due to fibrosis. Looking through the left side it appears to be that there is a piece of bone exposed at the septum. After completing all the debridement I also suction the left maxillary sinus. This maxillary sinus was full with " "secretion.\"        Patient Active Problem List   Diagnosis    Sinonasal undifferentiated carcinoma (H)    Nasal obstruction    Primary adenocarcinoma of nasal cavity (H)     RA, quiet for 12 years   Hypothyroidisim       Allergies:   Allergies   Allergen Reactions    Doxycycline Headache and Nausea     And Headaches      Hydroxychloroquine Headache and Tinnitus    Topiramate Tinnitus     Worsening Tinnitus      Scopolamine Other (See Comments)     Metal taste in mouth for days         Medications:  Current Outpatient Medications   Medication Sig Dispense Refill    acetaminophen (TYLENOL) 325 MG tablet Take 325-650 mg by mouth every 6 hours as needed for mild pain.      doxycycline hyclate (VIBRA-TABS) 100 MG tablet Take 1 tablet (100 mg) by mouth 2 times daily. 180 tablet 1    levothyroxine (SYNTHROID/LEVOTHROID) 75 MCG tablet Take 75 mcg by mouth daily (Patient not taking: Reported on 1/16/2025)      Misc Natural Product Nasal (PONARIS) SOLN Use 2-4 drops in each nostril before bedtime. 30 mL 11    ondansetron (ZOFRAN ODT) 4 MG ODT tab Take 1 tablet (4 mg) by mouth every 8 hours as needed for nausea 10 tablet 0    propranolol (INDERAL) 20 MG tablet Take 20 mg by mouth 2 times daily      rizatriptan (MAXALT) 10 MG tablet       spironolactone (ALDACTONE) 25 MG tablet Take 25 mg by mouth every morning           Immunizations:  Immunization History   Administered Date(s) Administered    COVID-19 MONOVALENT 12+ (Pfizer) 02/04/2021, 02/25/2021, 11/03/2021         Examination:  Vital signs:   Ht 1.727 m (5' 8\")   Wt 68 kg (150 lb)   BMI 22.81 kg/m      Unable to examine due to virtual visit       Laboratory:  Hematology:  Recent Labs   Lab Test 02/03/23  0738 02/02/23  1423   WBC 10.2  --    HGB 12.2 13.3   HCT 37.3  --      --        Chemistry:  Last Comprehensive Metabolic Panel:  Lab Results   Component Value Date     02/03/2023    POTASSIUM 4.1 02/03/2023    CHLORIDE 104 02/03/2023    CO2 26 02/03/2023 "    ANIONGAP 10 02/03/2023    GLC 89 02/05/2023    BUN 8.7 02/03/2023    CR 0.7 08/16/2023    GFRESTIMATED >60 08/16/2023    NERY 8.6 02/03/2023       Microbiology:     5/26/23 ethmoid sinus swab MSSA  9/27/23 swab from maxillary sinus bilateral MSSA  11/9/23 swab from left nare MSSA  5/2/24 - right nasal mucosa MSSA  8/8/24 tissue from sinus MSSA  9/27/24 swab from sinus - MSSA  9/27/24 swab from nose - alternaria, cladosporium, staph lugdunensis pan S, MSSA R only to bactrim    Imaging:MRI sinonasal 9/13/24  1.  Extensive postoperative changes in the nasal cavity. Bony nasal  septum appears dehiscent in several areas. No definite nonenhancing  septal tissue or mucosa appreciated to suggest osteomyelitis. Overall,  the appearance is similar to prior MR 5/9/2024.  2.  Mucosal thickening in the residual paranasal sinuses, particularly  the right ethmoid air cells and left maxillary sinus. This appears  increased since 5/9/2024 with new fluid in the left maxillary sinus.  No obvious nodular or masslike lesion, although residual/recurrent  tumor is not entirely excluded.    MRI 5/9/24   Postsurgical changes of left sinonasal resection including left sided  medial antrostomy, middle turbinectomy, ethmoidectomy, and frontal  sinusostomy. There is moderate residual mucosal thickening along the  margins of the surgical resection bed and within the left greater than  right maxillary sinuses, and within the right greater than left  ethmoid air cells. Continued opacification of the left sphenoid locule  with T1 hyperintense contents, consistent with proteinaceous debris.  Persistent dehiscence of the left cribriform plate with minimal  adjacent linear pachymeningeal enhancement, unchanged. No nodular  enhancement or abnormal signal of the adjacent brain parenchyma.    Impression:  Postsurgical sinonasal resection changes and mild to  moderate mucosal thickening without evidence of residual or recurrent  tumor.    MRI  sinonasal/oral/parotid 11/2/23 with and without contrast   Postsurgical sinonasal resection changes including resection of the  left cribriform plate, bilateral maxillary antrostomies, and left  ethmoidectomy with mild to moderate mucosal thickening along the  resection site. No new nodular thickening or enhancement.    Impression: Postsurgical left sinonasal resection changes with  moderate mucosal thickening along the resection site and within the  paranasal sinuses. No evidence of residual or recurrent tumor.

## 2025-02-13 ENCOUNTER — TELEPHONE (OUTPATIENT)
Dept: INFECTIOUS DISEASES | Facility: CLINIC | Age: 55
End: 2025-02-13
Payer: COMMERCIAL

## 2025-04-24 ENCOUNTER — MYC MEDICAL ADVICE (OUTPATIENT)
Dept: OTOLARYNGOLOGY | Facility: CLINIC | Age: 55
End: 2025-04-24
Payer: COMMERCIAL

## 2025-04-24 NOTE — TELEPHONE ENCOUNTER
Left Voicemail (1st Attempt) for the patient to call back and schedule the following:    Appointment type: Return ENT / CT and MRI  Provider: Dr. Lou  Return date: July / September  Specialty phone number: [phone]  Additional appointment(s) needed: Return ENT in July / CT and MRI in September  Additonal Notes: No

## 2025-04-28 ENCOUNTER — TELEPHONE (OUTPATIENT)
Dept: OTOLARYNGOLOGY | Facility: CLINIC | Age: 55
End: 2025-04-28
Payer: COMMERCIAL

## 2025-04-28 NOTE — TELEPHONE ENCOUNTER
LILIAN to schedule      Follow up with Carmine in July  MRI sinonasal and CT chest at [facility] in September         Gave imaging and ent number

## 2025-05-12 ENCOUNTER — VIRTUAL VISIT (OUTPATIENT)
Dept: INFECTIOUS DISEASES | Facility: CLINIC | Age: 55
End: 2025-05-12
Attending: STUDENT IN AN ORGANIZED HEALTH CARE EDUCATION/TRAINING PROGRAM
Payer: COMMERCIAL

## 2025-05-12 VITALS — WEIGHT: 150 LBS | BODY MASS INDEX: 22.73 KG/M2 | HEIGHT: 68 IN

## 2025-05-12 DIAGNOSIS — T66.XXXS ADVERSE EFFECT OF RADIATION, SEQUELA: ICD-10-CM

## 2025-05-12 DIAGNOSIS — C30.0 PRIMARY ADENOCARCINOMA OF NASAL CAVITY (H): ICD-10-CM

## 2025-05-12 DIAGNOSIS — J34.89 NASAL CRUSTING: Primary | ICD-10-CM

## 2025-05-12 PROCEDURE — 98006 SYNCH AUDIO-VIDEO EST MOD 30: CPT | Performed by: STUDENT IN AN ORGANIZED HEALTH CARE EDUCATION/TRAINING PROGRAM

## 2025-05-12 ASSESSMENT — PAIN SCALES - GENERAL: PAINLEVEL_OUTOF10: NO PAIN (0)

## 2025-05-12 NOTE — PROGRESS NOTES
Virtual Visit Details  Type of service:  Video Visit   Originating Location (pt. Location): Home    Distant Location (provider location):  On site   Platform used for Video Visit: Ihsan  Video time- 4.16 to 4.35 pm   Total time including chart review, care-coordination and documentation time on the date of encounter - 32 mins            [facility] [address] [address]  Phone: [phone]  Fax: [phone]      Today's Date: 05/12/2025      Recommendations:   Try any of the following below   Apply Aquaphor cream twice daily to the nose   Nasal rinse with oil tid instead of bid   Steam inhalation with oil, turmeric, jeff once a day   Discussed the standing order for doxy for 7-10 days if secondary bacterial infection were to develop.     RTC 3 months     Thank you for involving Infectious Disease in the care of this patient.     Dr. Chelo Jay  Division of Infectious Disease  [facility]    Assessment:  Rosalind Murphy is a 54 year old with PMH of RA on no meds- quiet for 12 years   Hypothyroidisim, sinonasal cancer diagnosed in 2022 s/p surgery and radiation in the first half of 2023.     Recurrent nasal obstruction, crusting and discharge since mid 2023/radiation: s/p multiple courses of abxs, gets better with it and worsens on stopping it. All cultures with MSSA. Latest nasal endoscopy with possible exposed bone of the nasal septum, ?osteomyelitis. Last MRI 5/2024 was not suggestive of osteo.    Repeat MRI 9/13/24 with no osteomyelitis. Taken to the OR on 9/27 with swollen mucosa and crusting, no exposed bone.     Suspect radiation has compromised mucosa with dryness causing crusting with secondary bacterial  infections with colonized bacteria. Tried doxy for 3 months (Oct 2024 to Jan 2025) with oil nasal rinses - it helped improve the condition significantly. She also applies aquaphor cream with a q tip to the nose  once daily.  No recurrence of symptoms after stopping doxy, however crusting continues. Discussed a couple other therapies detailed above.     -------------------------------------------------------------------------------------------------------------------  Interval history:  Last visit 2/12   1 month ago had follow up with ENT - sinonasal crusting was removed - better than before but stll quite a lot   Once a week or so she gets a lot of thick mucus - yellow or green   Gets crusts out which is usual for her   She does not have the daily congestion or malodorous discharge like before  She reports some dryness of the nasal sinuses  - applies aquaphor cream once a day consistently with a q tip.   She uses the nasal rinse with oil twice daily.   She has Allergies and wonders if that is playing a role lately. She has been using antihistamine recently.       Interval Events:  Last visit 10/18/24  Tried doxy for 3 months, Stopped doxy 2.5 weeks ago after the 3 month course   Overall symptoms are a lot better. She has some crusting but nothing like before. She does not have mucus or congestion like before. No symptom recurrence after stopping doxy   Saw Dr. Lou in ENT on 1/16 while on doxy, 3 months after prior visit. This was the first time she could go that long without seeing him, otherwise usually has to see him monthly. Quite a bit of crusting was present on 1/16 exam but much better than before     Doxy was causing quite a bit of nausea which was better if taken with food. She was also having headaches with the doxy     She has been using Liquid coconut oil with nasal rinse twice daily   Ponaris night time   Sesame oil nasal spray twice a week     Next appt with Dr. Lou is in April     Interval History:  Last visit 8/14/24  Had MRI sinonasal on 9/13, no new findings since previous one, no osteomyelitis   Dr. Lou took her to the OR on 9/27  - no exposed bone was seen. Mucosa was very swollen and lot of  crusting which was removed  Dr. Lou contacted me and we started doxy around 2.5 weeks ago. In the beginning she got nauseous a couple of times. Ok now.   She is doing ok from a sinus standpoint, she has intermittent headaches - not too bad. Is using oil in nasal rinse for a month now  Follow up with Dr. Lou a few days ago showed the mucosa to be in a much better condition    Reason for consult / Chief complaint:   Consulted by Dr. Lorenzo Lou-Deangelo* for recurrent nasal infection     History of presenting illness:  Rosalind Murphy is a 54 year old with PMH of RA on no meds- quiet for 12 years   Hypothyroidisim, sinonasal cancer diagnosed in 2022.     2022 she saw PCP for what she thought was recurrent sinus infections   She was sent to ENT by PCP - Dec 2022 was determined to be sinonasal cancer   Jan 2023 had surgery for the tumor, had a second surgery in feb 2023 with neurosurgery due to extension   Subsequently had Radiation - maxilla, nose, face bilateral   Since then has had cycles of dryness and crustiness of the nose alternating with yellow discharge   Crusting occurs left side of the nose, mucus discharge is from both sides   Crust sometimes goes to throat and she coughs it up   She feels nasal obstruction and Frontal headache  Top part of the nose is tender to touch  For these she has been off and on abxs since May 2023 - usually oral abxs for 2 weeks (clindamycin, keflex, augmentin) and nasal rinse abx for a month at a time  Last abx was in May 2024 - bactrim for 2 weeks  She feels good for a couple of weeks after the oral abx (Abx is the only thing that clears it up), then the crust starts building up   Family has noticed a smell with the recent infection, she does not have a sense of smell.  Of note all nasal cultures have grown staph aureus     She has been seeing ENT, thought due to dryness and she has was nasal saline rinses everyday (Deion-Med), recently cut back to twice weekly. She has  "been on Ponaris oil nasal drops at bedtime everyday for the past year.     She saw ENT recently and cleaned it up   8/8 nasal endoscopy   \"The nasal endoscope was passed under endoscopic vision. There was abundant sinonasal crust that was removed today with suction, nasal forceps and curettes. Under the crust there is a thin layer of yellowish-greenish secretion. I did obtain cultures again today. The right side is difficult to access because of the very limited space due to fibrosis. Looking through the left side it appears to be that there is a piece of bone exposed at the septum. After completing all the debridement I also suction the left maxillary sinus. This maxillary sinus was full with secretion.\"        Patient Active Problem List   Diagnosis    Sinonasal undifferentiated carcinoma (H)    Nasal obstruction    Primary adenocarcinoma of nasal cavity (H)     RA, quiet for 12 years   Hypothyroidisim       Allergies:   Allergies   Allergen Reactions    Doxycycline Headache and Nausea     And Headaches      Hydroxychloroquine Headache and Tinnitus    Topiramate Tinnitus     Worsening Tinnitus      Scopolamine Other (See Comments)     Metal taste in mouth for days         Medications:  Current Outpatient Medications   Medication Sig Dispense Refill    acetaminophen (TYLENOL) 325 MG tablet Take 325-650 mg by mouth every 6 hours as needed for mild pain.      doxycycline hyclate (VIBRA-TABS) 100 MG tablet Take 1 tablet (100 mg) by mouth 2 times daily. 180 tablet 1    levothyroxine (SYNTHROID/LEVOTHROID) 75 MCG tablet Take 75 mcg by mouth daily (Patient not taking: Reported on 4/17/2025)      Misc Natural Product Nasal (PONARIS) SOLN Use 2-4 drops in each nostril before bedtime. 30 mL 11    ondansetron (ZOFRAN ODT) 4 MG ODT tab Take 1 tablet (4 mg) by mouth every 8 hours as needed for nausea 10 tablet 0    propranolol (INDERAL) 20 MG tablet Take 20 mg by mouth 2 times daily      rizatriptan (MAXALT) 10 MG tablet       " "spironolactone (ALDACTONE) 25 MG tablet Take 25 mg by mouth every morning           Immunizations:  Immunization History   Administered Date(s) Administered    COVID-19 MONOVALENT 12+ (Pfizer) 02/04/2021, 02/25/2021, 11/03/2021         Examination:  Vital signs:   Ht 1.727 m (5' 8\")   Wt 68 kg (150 lb)   BMI 22.81 kg/m      Unable to examine due to virtual visit       Laboratory:  Hematology:  Recent Labs   Lab Test 02/03/23  0738 02/02/23  1423   WBC 10.2  --    ANEU 8.2  --    ALYM 1.3  --    SARA 0.7  --    AEOS 0.0  --    HGB 12.2 13.3   HCT 37.3  --      --        Chemistry:  Last Comprehensive Metabolic Panel:  Lab Results   Component Value Date     02/03/2023    POTASSIUM 4.1 02/03/2023    CHLORIDE 104 02/03/2023    CO2 26 02/03/2023    ANIONGAP 10 02/03/2023    GLC 89 02/05/2023    BUN 8.7 02/03/2023    CR 0.7 08/16/2023    GFRESTIMATED >60 08/16/2023    NERY 8.6 02/03/2023       Microbiology:     5/26/23 ethmoid sinus swab MSSA  9/27/23 swab from maxillary sinus bilateral MSSA  11/9/23 swab from left nare MSSA  5/2/24 - right nasal mucosa MSSA  8/8/24 tissue from sinus MSSA  9/27/24 swab from sinus - MSSA  9/27/24 swab from nose - alternaria, cladosporium, staph lugdunensis pan S, MSSA R only to bactrim    Imaging:MRI sinonasal 9/13/24  1.  Extensive postoperative changes in the nasal cavity. Bony nasal  septum appears dehiscent in several areas. No definite nonenhancing  septal tissue or mucosa appreciated to suggest osteomyelitis. Overall,  the appearance is similar to prior MR 5/9/2024.  2.  Mucosal thickening in the residual paranasal sinuses, particularly  the right ethmoid air cells and left maxillary sinus. This appears  increased since 5/9/2024 with new fluid in the left maxillary sinus.  No obvious nodular or masslike lesion, although residual/recurrent  tumor is not entirely excluded.    MRI 5/9/24   Postsurgical changes of left sinonasal resection including left sided  medial " antrostomy, middle turbinectomy, ethmoidectomy, and frontal  sinusostomy. There is moderate residual mucosal thickening along the  margins of the surgical resection bed and within the left greater than  right maxillary sinuses, and within the right greater than left  ethmoid air cells. Continued opacification of the left sphenoid locule  with T1 hyperintense contents, consistent with proteinaceous debris.  Persistent dehiscence of the left cribriform plate with minimal  adjacent linear pachymeningeal enhancement, unchanged. No nodular  enhancement or abnormal signal of the adjacent brain parenchyma.    Impression:  Postsurgical sinonasal resection changes and mild to  moderate mucosal thickening without evidence of residual or recurrent  tumor.    MRI sinonasal/oral/parotid 11/2/23 with and without contrast   Postsurgical sinonasal resection changes including resection of the  left cribriform plate, bilateral maxillary antrostomies, and left  ethmoidectomy with mild to moderate mucosal thickening along the  resection site. No new nodular thickening or enhancement.    Impression: Postsurgical left sinonasal resection changes with  moderate mucosal thickening along the resection site and within the  paranasal sinuses. No evidence of residual or recurrent tumor.

## 2025-05-12 NOTE — LETTER
5/12/2025       RE: Rosalind Murphy  [address] [address] [facility] [address]     Dear Colleague,    Thank you for referring your patient, Rosalind Murphy, to the [facility] IN[facility] DISEASE CLINIC [place] at [facility]. Please see a copy of my visit note below.    Virtual Visit Details  Type of service:  Video Visit   Originating Location (pt. Location): Home    Distant Location (provider location):  On site   Platform used for Video Visit: PagerDuty  Video time- 4.16 to 4.35 pm   Total time including chart review, care-coordination and documentation time on the date of encounter - 32 mins            [facility] DISEASE CLIN[facility] [place]    [address] [facility] [address]  Phone: [phone]  Fax: [phone]      Today's Date: 05/12/2025      Recommendations:   Try any of the following below   Apply Aquaphor cream twice daily to the nose   Nasal rinse with oil tid instead of bid   Steam inhalation with oil, turmeric, jeff once a day   Discussed the standing order for doxy for 7-10 days if secondary bacterial infection were to develop.     RTC 3 months     Thank you for involving Infectious Disease in the care of this patient.     Dr. Chelo Jay  Division of Infectious Disease  [facility]    Assessment:  Rosalind Murphy is a 54 year old with PMH of RA on no meds- quiet for 12 years   Hypothyroidisim, sinonasal cancer diagnosed in 2022 s/p surgery and radiation in the first half of 2023.     Recurrent nasal obstruction, crusting and discharge since mid 2023/radiation: s/p multiple courses of abxs, gets better with it and worsens on stopping it. All cultures with MSSA. Latest nasal endoscopy with possible exposed bone of the nasal septum, ?osteomyelitis. Last MRI 5/2024 was not suggestive of osteo.    Repeat MRI 9/13/24 with no osteomyelitis. Taken to the OR on 9/27 with swollen mucosa and crusting, no exposed  bone.     Suspect radiation has compromised mucosa with dryness causing crusting with secondary bacterial  infections with colonized bacteria. Tried doxy for 3 months (Oct 2024 to Jan 2025) with oil nasal rinses - it helped improve the condition significantly. She also applies aquaphor cream with a q tip to the nose once daily.  No recurrence of symptoms after stopping doxy, however crusting continues. Discussed a couple other therapies detailed above.     -------------------------------------------------------------------------------------------------------------------  Interval history:  Last visit 2/12   1 month ago had follow up with ENT - sinonasal crusting was removed - better than before but stll quite a lot   Once a week or so she gets a lot of thick mucus - yellow or green   Gets crusts out which is usual for her   She does not have the daily congestion or malodorous discharge like before  She reports some dryness of the nasal sinuses  - applies aquaphor cream once a day consistently with a q tip.   She uses the nasal rinse with oil twice daily.   She has Allergies and wonders if that is playing a role lately. She has been using antihistamine recently.       Interval Events:  Last visit 10/18/24  Tried doxy for 3 months, Stopped doxy 2.5 weeks ago after the 3 month course   Overall symptoms are a lot better. She has some crusting but nothing like before. She does not have mucus or congestion like before. No symptom recurrence after stopping doxy   Saw Dr. Lou in ENT on 1/16 while on doxy, 3 months after prior visit. This was the first time she could go that long without seeing him, otherwise usually has to see him monthly. Quite a bit of crusting was present on 1/16 exam but much better than before     Doxy was causing quite a bit of nausea which was better if taken with food. She was also having headaches with the doxy     She has been using Liquid coconut oil with nasal rinse twice daily   Ponaris  night time   Sesame oil nasal spray twice a week     Next appt with Dr. Lou is in April     Interval History:  Last visit 8/14/24  Had MRI sinonasal on 9/13, no new findings since previous one, no osteomyelitis   Dr. Lou took her to the OR on 9/27  - no exposed bone was seen. Mucosa was very swollen and lot of crusting which was removed  Dr. Lou contacted me and we started doxy around 2.5 weeks ago. In the beginning she got nauseous a couple of times. Ok now.   She is doing ok from a sinus standpoint, she has intermittent headaches - not too bad. Is using oil in nasal rinse for a month now  Follow up with Dr. Lou a few days ago showed the mucosa to be in a much better condition    Reason for consult / Chief complaint:   Consulted by Dr. Lorenzo Lou-Gra* for recurrent nasal infection     History of presenting illness:  Rosalind Murphy is a 54 year old with PMH of RA on no meds- quiet for 12 years   Hypothyroidisim, sinonasal cancer diagnosed in 2022.     2022 she saw PCP for what she thought was recurrent sinus infections   She was sent to ENT by PCP - Dec 2022 was determined to be sinonasal cancer   Jan 2023 had surgery for the tumor, had a second surgery in feb 2023 with neurosurgery due to extension   Subsequently had Radiation - maxilla, nose, face bilateral   Since then has had cycles of dryness and crustiness of the nose alternating with yellow discharge   Crusting occurs left side of the nose, mucus discharge is from both sides   Crust sometimes goes to throat and she coughs it up   She feels nasal obstruction and Frontal headache  Top part of the nose is tender to touch  For these she has been off and on abxs since May 2023 - usually oral abxs for 2 weeks (clindamycin, keflex, augmentin) and nasal rinse abx for a month at a time  Last abx was in May 2024 - bactrim for 2 weeks  She feels good for a couple of weeks after the oral abx (Abx is the only thing that clears it up), then the  "crust starts building up   Family has noticed a smell with the recent infection, she does not have a sense of smell.  Of note all nasal cultures have grown staph aureus     She has been seeing ENT, thought due to dryness and she has was nasal saline rinses everyday (Deion-Med), recently cut back to twice weekly. She has been on Ponaris oil nasal drops at bedtime everyday for the past year.     She saw ENT recently and cleaned it up   8/8 nasal endoscopy   \"The nasal endoscope was passed under endoscopic vision. There was abundant sinonasal crust that was removed today with suction, nasal forceps and curettes. Under the crust there is a thin layer of yellowish-greenish secretion. I did obtain cultures again today. The right side is difficult to access because of the very limited space due to fibrosis. Looking through the left side it appears to be that there is a piece of bone exposed at the septum. After completing all the debridement I also suction the left maxillary sinus. This maxillary sinus was full with secretion.\"        Patient Active Problem List   Diagnosis     Sinonasal undifferentiated carcinoma (H)     Nasal obstruction     Primary adenocarcinoma of nasal cavity (H)     RA, quiet for 12 years   Hypothyroidisim       Allergies:   Allergies   Allergen Reactions     Doxycycline Headache and Nausea     And Headaches       Hydroxychloroquine Headache and Tinnitus     Topiramate Tinnitus     Worsening Tinnitus       Scopolamine Other (See Comments)     Metal taste in mouth for days         Medications:  Current Outpatient Medications   Medication Sig Dispense Refill     acetaminophen (TYLENOL) 325 MG tablet Take 325-650 mg by mouth every 6 hours as needed for mild pain.       doxycycline hyclate (VIBRA-TABS) 100 MG tablet Take 1 tablet (100 mg) by mouth 2 times daily. 180 tablet 1     levothyroxine (SYNTHROID/LEVOTHROID) 75 MCG tablet Take 75 mcg by mouth daily (Patient not taking: Reported on 4/17/2025)       " "Misc Natural Product Nasal (PONARIS) SOLN Use 2-4 drops in each nostril before bedtime. 30 mL 11     ondansetron (ZOFRAN ODT) 4 MG ODT tab Take 1 tablet (4 mg) by mouth every 8 hours as needed for nausea 10 tablet 0     propranolol (INDERAL) 20 MG tablet Take 20 mg by mouth 2 times daily       rizatriptan (MAXALT) 10 MG tablet        spironolactone (ALDACTONE) 25 MG tablet Take 25 mg by mouth every morning           Immunizations:  Immunization History   Administered Date(s) Administered     COVID-19 MONOVALENT 12+ (Pfizer) 02/04/2021, 02/25/2021, 11/03/2021         Examination:  Vital signs:   Ht 1.727 m (5' 8\")   Wt 68 kg (150 lb)   BMI 22.81 kg/m      Unable to examine due to virtual visit       Laboratory:  Hematology:  Recent Labs   Lab Test 02/03/23  0738 02/02/23  1423   WBC 10.2  --    ANEU 8.2  --    ALYM 1.3  --    SARA 0.7  --    AEOS 0.0  --    HGB 12.2 13.3   HCT 37.3  --      --        Chemistry:  Last Comprehensive Metabolic Panel:  Lab Results   Component Value Date     02/03/2023    POTASSIUM 4.1 02/03/2023    CHLORIDE 104 02/03/2023    CO2 26 02/03/2023    ANIONGAP 10 02/03/2023    GLC 89 02/05/2023    BUN 8.7 02/03/2023    CR 0.7 08/16/2023    GFRESTIMATED >60 08/16/2023    NERY 8.6 02/03/2023       Microbiology:     5/26/23 ethmoid sinus swab MSSA  9/27/23 swab from maxillary sinus bilateral MSSA  11/9/23 swab from left nare MSSA  5/2/24 - right nasal mucosa MSSA  8/8/24 tissue from sinus MSSA  9/27/24 swab from sinus - MSSA  9/27/24 swab from nose - alternaria, cladosporium, staph lugdunensis pan S, MSSA R only to bactrim    Imaging:MRI sinonasal 9/13/24  1.  Extensive postoperative changes in the nasal cavity. Bony nasal  septum appears dehiscent in several areas. No definite nonenhancing  septal tissue or mucosa appreciated to suggest osteomyelitis. Overall,  the appearance is similar to prior MR 5/9/2024.  2.  Mucosal thickening in the residual paranasal sinuses, " particularly  the right ethmoid air cells and left maxillary sinus. This appears  increased since 5/9/2024 with new fluid in the left maxillary sinus.  No obvious nodular or masslike lesion, although residual/recurrent  tumor is not entirely excluded.    MRI 5/9/24   Postsurgical changes of left sinonasal resection including left sided  medial antrostomy, middle turbinectomy, ethmoidectomy, and frontal  sinusostomy. There is moderate residual mucosal thickening along the  margins of the surgical resection bed and within the left greater than  right maxillary sinuses, and within the right greater than left  ethmoid air cells. Continued opacification of the left sphenoid locule  with T1 hyperintense contents, consistent with proteinaceous debris.  Persistent dehiscence of the left cribriform plate with minimal  adjacent linear pachymeningeal enhancement, unchanged. No nodular  enhancement or abnormal signal of the adjacent brain parenchyma.    Impression:  Postsurgical sinonasal resection changes and mild to  moderate mucosal thickening without evidence of residual or recurrent  tumor.    MRI sinonasal/oral/parotid 11/2/23 with and without contrast   Postsurgical sinonasal resection changes including resection of the  left cribriform plate, bilateral maxillary antrostomies, and left  ethmoidectomy with mild to moderate mucosal thickening along the  resection site. No new nodular thickening or enhancement.    Impression: Postsurgical left sinonasal resection changes with  moderate mucosal thickening along the resection site and within the  paranasal sinuses. No evidence of residual or recurrent tumor.                    Again, thank you for allowing me to participate in the care of your patient.      Sincerely,    Chelo Jay MD

## 2025-05-12 NOTE — NURSING NOTE
Current patient location: [address] [place] [address]    Is the patient currently in the state of [place]? YES    Visit mode: VIDEO    If the visit is dropped, the patient can be reconnected by:VIDEO VISIT: Text to cell phone:   Telephone Information:   Mobile [phone]       Will anyone else be joining the visit? NO  (If patient encounters technical issues they should call [phone]    Are changes needed to the allergy or medication list? No    Are refills needed on medications prescribed by this physician? NO    Rooming Documentation:  Questionnaire(s) completed    Reason for visit: RECHECK    Mitzi OROZCO

## 2025-05-15 ENCOUNTER — TELEPHONE (OUTPATIENT)
Dept: INFECTIOUS DISEASES | Facility: CLINIC | Age: 55
End: 2025-05-15
Payer: COMMERCIAL

## 2025-05-21 NOTE — PATIENT INSTRUCTIONS
You were seen in the ENT Clinic today by Dr. Vyas. If you have any questions or concerns after your appointment, please contact us (see below)       2.   The following recommendations have been made based upon your appointment today:   -Ponaris 2 drops per nostril up to 3 days.   -Nose cones as desired.   -Complete antibiotic rinses.      3.   Please return to the ENT clinic as previously scheduled.           How to Contact Us:  Send a De Correspondent message to your provider. Our team will respond to you via De Correspondent. Occasionally, we will need to call you to get further information.  For urgent matters (Monday-Friday), call the ENT Clinic: [phone] and speak with a call center team member - they will route your call appropriately.   If you'd like to speak directly with a nurse, please find our contact information below. We do our best to check voicemail frequently throughout the day, and will work to call you back within 1-2 days. For urgent matters, please use the general clinic phone numbers listed above.        Syeda VALADEZ RN  ENT RN Care Coordinator  Direct: [phone]  Karla PIMENTEL LPN  Direct: [phone]         [facility]  Department of Otolaryngology    warm and dry/color normal/normal/no rashes/no ulcers

## 2025-06-23 ENCOUNTER — TELEPHONE (OUTPATIENT)
Dept: OTOLARYNGOLOGY | Facility: CLINIC | Age: 55
End: 2025-06-23
Payer: COMMERCIAL

## 2025-06-23 NOTE — TELEPHONE ENCOUNTER
Left Voicemail (1st Attempt) for the patient to call back and schedule the following:    Appointment type: return ent  Provider: Carmine  Return date: on June 26, 2025 @ 1pm or 7/10 at 3 or 3:40 pm   Specialty phone number: writer's direct  Additional appointment(s) needed: n/a  Additional Notes: Dr. Lou will not be available for visit scheduled on 7/3. Gave direct (and call center number) to reschedule visit.    Michaela Canchola on 6/23/2025 at 2:54 PM

## 2025-06-26 ENCOUNTER — OFFICE VISIT (OUTPATIENT)
Dept: OTOLARYNGOLOGY | Facility: CLINIC | Age: 55
End: 2025-06-26
Payer: COMMERCIAL

## 2025-06-26 VITALS — BODY MASS INDEX: 23.95 KG/M2 | WEIGHT: 158 LBS | HEIGHT: 68 IN

## 2025-06-26 DIAGNOSIS — R59.1 LYMPHADENOPATHY: Primary | ICD-10-CM

## 2025-06-26 DIAGNOSIS — C30.0 PRIMARY ADENOCARCINOMA OF NASAL CAVITY (H): ICD-10-CM

## 2025-06-26 ASSESSMENT — PAIN SCALES - GENERAL: PAINLEVEL_OUTOF10: NO PAIN (0)

## 2025-06-26 NOTE — PATIENT INSTRUCTIONS
You were seen in the ENT Clinic today by Dr. Hamilton. If you have any questions or concerns after your appointment, please contact us (see below)    The following has been recommended for you based upon your appointment today:  MRI sinonasal and PET scan as scheduled on 9/17/25  US head neck     Please return to clinic on 10/2/25 as scheduled for follow up with Dr. Lou     How to Contact Us:  Send a itBit message to your provider. Our team will respond to you via itBit. Occasionally, we will need to call you to get further information.  For urgent matters (Monday-Friday), call the ENT Clinic: [phone] and speak with a call center team member - they will route your call appropriately.   If you'd like to speak directly with a nurse, please find our contact information below. We do our best to check voicemail frequently throughout the day, and will work to call you back within 1-2 days. For urgent matters, please use the general clinic phone numbers listed above.    Codi MORALES RN, BSN  RN Care Coordinator, ENT Clinic  [facility]  Direct: [phone]

## 2025-06-26 NOTE — LETTER
6/26/2025       RE: Rosalind Murphy  [address] [address] [address]     Dear Colleague,    Thank you for referring your patient, Rosalind Murphy, to the [facility] [place] at [facility]. Please see a copy of my visit note below.    Diagnosis: Sinonasal ITAC     Procedure Date: 02/02/2023     PREOPERATIVE DIAGNOSES:    1.  Intestinal type sinonasal adenocarcinoma.  2.  Nasal obstruction.     PREOPERATIVE DIAGNOSES:    1.  Intestinal type sinonasal adenocarcinoma.  2.  Nasal obstruction.     PROCEDURE PERFORMED:     1.  Endoscopic endonasal transcribriform resection of anterior cranial fossa intradural tumor, left sinonasal adenocarcinoma.  2.  [place]yth of right-sided pedicled nasoseptal flap pedicled off the posterior septal branch of sphenopalatine artery.     SURGEON:  Germain Vyas MD     CO-SURGEON:  Abhi Tidwell MD     Completed radiation : 5/3/2023 in [place]     9/27/2024: Procedure: Transnasal endoscopic approach with excision of abundant sinonasal crust, close examination of the sinonasal cavity, lysis of right sinonasal synechia       History of Present Illness: 55-year-old female.  The patient is here today in the otolaryngology clinic for follow-up regarding her sinonasal intestinal type adenocarcinoma.  She is now 2 years after completing surgery followed by radiation therapy.  She continues to have nasal crusting.  She tells me that she did have to start antibiotics about 4 to 5 weeks ago due to abundant purulent rhinorrhea and postnasal drainage.  The antibiotic did work slowing down the amount of yellowish-greenish secretion.  She is still have a lot of nasal congestion and crusting.  She continues to use saline irrigations with coconut oil twice a day.  She also complains today about her right submandibular pain-discomfort.  This started about 3 days ago.  She denies any lumps or bumps in her neck.  She  denies any redness or edema in the right neck.  She denies any difficulty swallowing.  She is not losing any weight.    MEDICATIONS:     Current Outpatient Medications   Medication Sig Dispense Refill     acetaminophen (TYLENOL) 325 MG tablet Take 325-650 mg by mouth every 6 hours as needed for mild pain.       doxycycline hyclate (VIBRA-TABS) 100 MG tablet Take 1 tablet (100 mg) by mouth 2 times daily. 180 tablet 1     Misc Natural Product Nasal (PONARIS) SOLN Use 2-4 drops in each nostril before bedtime. 30 mL 11     ondansetron (ZOFRAN ODT) 4 MG ODT tab Take 1 tablet (4 mg) by mouth every 8 hours as needed for nausea 10 tablet 0     propranolol (INDERAL) 20 MG tablet Take 20 mg by mouth 2 times daily       rizatriptan (MAXALT) 10 MG tablet        levothyroxine (SYNTHROID/LEVOTHROID) 75 MCG tablet Take 75 mcg by mouth daily (Patient not taking: Reported on 6/26/2025)       spironolactone (ALDACTONE) 25 MG tablet Take 25 mg by mouth every morning         ALLERGIES:    Allergies   Allergen Reactions     Doxycycline Headache and Nausea     And Headaches       Hydroxychloroquine Headache and Tinnitus     Topiramate Tinnitus     Worsening Tinnitus       Scopolamine Other (See Comments)     Metal taste in mouth for days         PAST MEDICAL HISTORY:   Past Medical History:   Diagnosis Date     History of hysterectomy 03/06/2015     Hypothyroidism      Motion sickness      PONV (postoperative nausea and vomiting)      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    PHYSICAL EXAMINATION:      Constitutional:  The patient was accompanied, well-groomed, and in no acute distress.     Skin: Normal:  warm and pink without rash   Neurologic: Alert and oriented x 3.  CN's III-XII within normal limits.  Voice normal.    Psychiatric: The patient's affect was calm, cooperative, and appropriate.     Communication:  Normal; communicates verbally, normal voice quality.   Respiratory: Breathing comfortably without stridor or exertion of accessory muscles.    Head/Face:  Normocephalic and atraumatic.  No lesions or scars. No sinus tenderness.    Salivary glands -  Normal size, no tenderness, swelling, or palpable masses   Eyes: Pupils were equal and reactive.  Extraocular movement intact.         Nose: Sinuses were non-tender.  Anterior rhinoscopy revealed midline septum and absence of purulence or polyps.     Oral Cavity: Normal tongue, floor of mouth, buccal mucosa, and palate.  No lesions or masses on inspection or palpation.     Oropharynx: Normal mucosa, palate symmetric with normal elevation. No abnormal lymph tissue in the oropharynx.             Neck: Supple with normal laryngeal and tracheal landmarks.  The parotid beds were without masses.  No palpable thyroid.  Normal range of motion   Lymphatic: There is no palpable lymphadenopathy in the neck.        Nasal endoscopy and bilateral sinonasal debridement: Consent for nasal endoscopy and bilateral sinonasal debridement was obtained.  I confirmed correctness of the procedure and identity of the patient.  Nasal endoscopy and sinonasal debridement was indicated due to history of sinonasal malignancy and abundant sinonasal crust.  The nose was topically decongested and anesthetized.  The nasal endoscope was passed under endoscopic vision.  On the left side the septum is slightly deviated to the right.  There is abundant sinonasal crust that was removed today using a combination of suction and sinonasal forceps.  This  removal of crusting was bilaterally.  On the right side the septum is deviated to the right.  And I remove abundant sinonasal crust from the right side too.  The underlying mucosa all throughout the sinonasal passages is edematous.  There is also purulent secretion in the left maxillary sinus that was suctioned with a curved suction.  After completing all the sinonasal debridement I did not see any evidence of masses or lesions that are concerning for recurrence.    IMPRESSION AND PLAN: 55-year-old female.  The patient is 2 years from completing treatment for her sinonasal intestinal type adenocarcinoma.  She is doing really well.  Today we had abundant sinonasal crust that was removed in clinic.  I did not feel any abnormalities on her right neck on palpation today.  Palpating the right level 1B the patient does express some mild pain in this area.  I am going to start her on antibiotics she is going to start on doxycycline 100 mg p.o. twice daily.  I am going to order an ultrasound of the neck to rule out any lymphadenopathy.  I would like to see the patient back in October 2025 with her already scheduled images.  I am going to start her also on Betadine sinonasal irrigations.  I gave the patient a print out with the instructions for these irrigations.      Lorenzo Hamilton MD, M.S.  Otolaryngology- Head & Neck Surgery  [phone]         Again, thank you for allowing me to participate in the care of your patient.      Sincerely,    Lorenzo Hamilton MD

## 2025-07-03 ENCOUNTER — ANCILLARY PROCEDURE (OUTPATIENT)
Dept: ULTRASOUND IMAGING | Facility: CLINIC | Age: 55
End: 2025-07-03
Attending: OTOLARYNGOLOGY
Payer: COMMERCIAL

## 2025-07-03 DIAGNOSIS — R59.1 LYMPHADENOPATHY: ICD-10-CM

## 2025-07-03 DIAGNOSIS — C30.0 PRIMARY ADENOCARCINOMA OF NASAL CAVITY (H): ICD-10-CM

## 2025-07-03 PROCEDURE — 76536 US EXAM OF HEAD AND NECK: CPT | Performed by: RADIOLOGY

## 2025-07-13 ENCOUNTER — HEALTH MAINTENANCE LETTER (OUTPATIENT)
Age: 55
End: 2025-07-13

## 2025-08-19 ENCOUNTER — TELEPHONE (OUTPATIENT)
Dept: INFECTIOUS DISEASES | Facility: CLINIC | Age: 55
End: 2025-08-19
Payer: COMMERCIAL

## (undated) DEVICE — PROTECTOR ARM ONE-STEP TRENDELENBURG 40418

## (undated) DEVICE — LINEN TOWEL PACK X6 WHITE 5487

## (undated) DEVICE — DRAPE IOBAN INCISE 13X13" 6640EZ

## (undated) DEVICE — BLADE SHAVER SERRATED 4MM ROTATE 1884002HRE

## (undated) DEVICE — LINEN TOWEL PACK X30 5481

## (undated) DEVICE — ENDO SHEATH STORZ SHARPSITE ENDOSCRUB 30DEG 4MM 1912010

## (undated) DEVICE — PACK NEURO MINOR UMMC SNE32MNMU4

## (undated) DEVICE — EYE FLUORESCEIN OPHTHALMIC STRIP FLO-GLO 1272111

## (undated) DEVICE — ESU HOLSTER PLASTIC DISP E2400

## (undated) DEVICE — SPONGE SURGIFOAM 01GM POWDER 1978

## (undated) DEVICE — NDL COUNTER 20CT 31142493

## (undated) DEVICE — DRAPE U SPLIT 74X120" 29440

## (undated) DEVICE — ESU MINI EPIDURAL VEIN SEALER AQUAMANTIS 3.4MM 23-314-1

## (undated) DEVICE — SPLINT NASAL DOYLE BREATHEASY 20-10500

## (undated) DEVICE — ENDO SHEATH STORZ SHARPSITE ENDOSCRUB 0DEG 4MM 1912000

## (undated) DEVICE — GLOVE BIOGEL PI ULTRATOUCH SZ 7.5 41175

## (undated) DEVICE — DRAPE EYE SHEET 8441

## (undated) DEVICE — SYR 30ML LL W/O NDL 302832

## (undated) DEVICE — SPONGE RAY-TEC 4X8" 7318

## (undated) DEVICE — SPECIMEN TRAP STRYKER NEPTUNE IN-LINE 0700-050-000

## (undated) DEVICE — TUBING STRYKER IRRIGATION CASSETTE 5400-050-001

## (undated) DEVICE — ESU ELEC NDL 6" COATED/INSULATED E1465-6

## (undated) DEVICE — DRAPE CORETEMP FLUID WARM SYSTEM 62X56IN CTD200

## (undated) DEVICE — SYRINGE EAR/ULCER STERILE 2 OZ BULB 4172

## (undated) DEVICE — SOL RINGERS LACTATED 500ML BAG 2B2323QA

## (undated) DEVICE — NDL 25GA 2"  8881200441

## (undated) DEVICE — SURGICEL HEMOSTAT 4X8" 1952

## (undated) DEVICE — SPONGE COTTONOID 1/2X3" 80-1407

## (undated) DEVICE — BUR 30K DIAMOND 70DG 3MM 13CM 1883070BLD

## (undated) DEVICE — LINEN TOWEL PACK X5 5464

## (undated) DEVICE — DRSG NASOPORE FRAG FIRM 8CM 5400-020-008

## (undated) DEVICE — SOL NACL 0.9% IRRIG 500ML BOTTLE 2F7123

## (undated) DEVICE — GLOVE BIOGEL PI MICRO SZ 7.5 48575

## (undated) DEVICE — SPONGE COTTONOID 1/2X1" 80-1402

## (undated) DEVICE — SUCTION MANIFOLD NEPTUNE 2 SYS 4 PORT 0702-020-000

## (undated) DEVICE — STRAP UNIVERSAL POSITIONING 2-PIECE 4X47X76" 91-287

## (undated) DEVICE — DRSG TELFA 3X8" 1238

## (undated) DEVICE — SOL WATER IRRIG 1000ML BOTTLE 2F7114

## (undated) DEVICE — TUBING SUCTION MEDI-VAC 1/4"X20' N620A

## (undated) DEVICE — SOL RINGERS LACTATED 1000ML BAG 07953-09

## (undated) DEVICE — BLADE SHAVER SINUS 3.5MM RAD 40 ROTATE 1883506HRE

## (undated) DEVICE — BUR 30K TAPER CHOANAL ATRESIA 1884015RTD

## (undated) DEVICE — BUR STRK ROUND DIAMOND 4.0MM COARSE 8450-012-040DC

## (undated) DEVICE — BLADE QUADCUT ROTATABLE FUSION 4.3MMX13CM M4 1884380EM

## (undated) DEVICE — LABEL MEDICATION SYSTEM 3303-P

## (undated) DEVICE — PACK ENT ENDOSCOPY CUSTOM ASC

## (undated) DEVICE — STPL SKIN 35W 059037

## (undated) DEVICE — SYR 10ML FINGER CONTROL W/O NDL 309695

## (undated) DEVICE — SPONGE COTTONOID 2X1/2" 80-1406

## (undated) DEVICE — Device

## (undated) DEVICE — GLOVE BIOGEL PI MICRO SZ 7.0 48570

## (undated) DEVICE — SOL NACL 0.9% INJ 1000ML BAG 2B1324X

## (undated) DEVICE — ESU GROUND PAD ADULT W/CORD E7507

## (undated) DEVICE — TUBING SUCTION 10'X3/16" N510

## (undated) DEVICE — DECANTER VIAL 2006S

## (undated) DEVICE — TUBING SUCTION MEDI-VAC SOFT 3/16"X20' N520A

## (undated) DEVICE — BLADE SINUS RAD12 CVD 4MM FUSION ROTATE W/TRACKING

## (undated) DEVICE — DRAPE SHEET REV FOLD 3/4 9349

## (undated) DEVICE — SPONGE COTTONOID 1/2X1/2" 20-04S

## (undated) DEVICE — BLADE FEATHER MIZUHO K-6400

## (undated) DEVICE — ADH SKIN CLOSURE PREMIERPRO EXOFIN 1.0ML 3470

## (undated) DEVICE — SU ETHILON 3-0 PS-1 18" 1663H

## (undated) DEVICE — EYE PREP BETADINE 5% SOLUTION 30ML 0065-0411-30

## (undated) DEVICE — SYR 03ML LL W/O NDL 309657

## (undated) DEVICE — ESU SUCTION CAUTERY 10FR FOOT CONTROL E2505-10FR

## (undated) DEVICE — DRAPE POUCH INSTRUMENT 1018

## (undated) DEVICE — SU MONOCRYL 4-0 PS-2 27" UND Y426H

## (undated) DEVICE — SU VICRYL 3-0 SH 8X18" UND J864D

## (undated) DEVICE — CATH TRAY FOLEY SURESTEP 16FR W/TMP PRB STLK LATEX A319416AM

## (undated) DEVICE — TRAY PREP DRY SKIN SCRUB 067

## (undated) DEVICE — GOWN XLG DISP 9545

## (undated) DEVICE — DRAPE STOCKINETTE IMPERVIOUS 10" 21048

## (undated) DEVICE — PREP CHLORAPREP CLEAR 3ML 930400

## (undated) DEVICE — PITCHER STERILE 1000ML  SSK9004A

## (undated) DEVICE — COVER CAMERA VIDEO LASER 9X96" 04-CC227

## (undated) DEVICE — DRAPE WARMER 66X44" ORS-300

## (undated) DEVICE — DRSG PRIMAPORE 03 1/8X6" 66000318

## (undated) DEVICE — SLEEVE IRRIGATION MIS 13.0CM 5/PKG 5407-010-950

## (undated) DEVICE — SPONGE SURGIFOAM 100 1974

## (undated) DEVICE — GUIDE LG SPIWAY SRG ENSL

## (undated) DEVICE — TRACKER ENT OTS INSTRUMENT FUSION 9733533

## (undated) DEVICE — ENDO INSERT ESU BIPOLAR FCP STORZ VERTICAL CLOSING 28164FGL

## (undated) DEVICE — DRAPE MAYO STAND 23X54 8337

## (undated) DEVICE — TRACKER PATIENT NON-INVASIVE AXIEM 9734887

## (undated) RX ORDER — INDOCYANINE GREEN AND WATER 25 MG
KIT INJECTION
Status: DISPENSED
Start: 2023-02-02

## (undated) RX ORDER — HYDROMORPHONE HYDROCHLORIDE 1 MG/ML
INJECTION, SOLUTION INTRAMUSCULAR; INTRAVENOUS; SUBCUTANEOUS
Status: DISPENSED
Start: 2023-02-02

## (undated) RX ORDER — FENTANYL CITRATE 50 UG/ML
INJECTION, SOLUTION INTRAMUSCULAR; INTRAVENOUS
Status: DISPENSED
Start: 2023-02-02

## (undated) RX ORDER — OXYMETAZOLINE HYDROCHLORIDE 0.05 G/100ML
SPRAY NASAL
Status: DISPENSED
Start: 2024-09-27

## (undated) RX ORDER — LIDOCAINE HYDROCHLORIDE AND EPINEPHRINE 10; 10 MG/ML; UG/ML
INJECTION, SOLUTION INFILTRATION; PERINEURAL
Status: DISPENSED
Start: 2023-02-02

## (undated) RX ORDER — ONDANSETRON 2 MG/ML
INJECTION INTRAMUSCULAR; INTRAVENOUS
Status: DISPENSED
Start: 2023-01-17

## (undated) RX ORDER — FENTANYL CITRATE-0.9 % NACL/PF 10 MCG/ML
PLASTIC BAG, INJECTION (ML) INTRAVENOUS
Status: DISPENSED
Start: 2024-09-27

## (undated) RX ORDER — FENTANYL CITRATE 50 UG/ML
INJECTION, SOLUTION INTRAMUSCULAR; INTRAVENOUS
Status: DISPENSED
Start: 2023-01-17

## (undated) RX ORDER — OXYCODONE HYDROCHLORIDE 5 MG/1
TABLET ORAL
Status: DISPENSED
Start: 2023-01-17

## (undated) RX ORDER — APREPITANT 40 MG/1
CAPSULE ORAL
Status: DISPENSED
Start: 2023-02-02

## (undated) RX ORDER — FENTANYL CITRATE 50 UG/ML
INJECTION, SOLUTION INTRAMUSCULAR; INTRAVENOUS
Status: DISPENSED
Start: 2024-09-27

## (undated) RX ORDER — PROPOFOL 10 MG/ML
INJECTION, EMULSION INTRAVENOUS
Status: DISPENSED
Start: 2024-09-27

## (undated) RX ORDER — EPINEPHRINE NASAL SOLUTION 1 MG/ML
SOLUTION NASAL
Status: DISPENSED
Start: 2023-01-17

## (undated) RX ORDER — DEXAMETHASONE SODIUM PHOSPHATE 4 MG/ML
INJECTION, SOLUTION INTRA-ARTICULAR; INTRALESIONAL; INTRAMUSCULAR; INTRAVENOUS; SOFT TISSUE
Status: DISPENSED
Start: 2023-01-17

## (undated) RX ORDER — ACETAMINOPHEN 325 MG/1
TABLET ORAL
Status: DISPENSED
Start: 2023-02-02

## (undated) RX ORDER — ONDANSETRON 2 MG/ML
INJECTION INTRAMUSCULAR; INTRAVENOUS
Status: DISPENSED
Start: 2024-09-27

## (undated) RX ORDER — OXYCODONE HYDROCHLORIDE 5 MG/1
TABLET ORAL
Status: DISPENSED
Start: 2024-09-27

## (undated) RX ORDER — PROPOFOL 10 MG/ML
INJECTION, EMULSION INTRAVENOUS
Status: DISPENSED
Start: 2023-01-17

## (undated) RX ORDER — OXYMETAZOLINE HYDROCHLORIDE 0.05 G/100ML
SPRAY NASAL
Status: DISPENSED
Start: 2023-01-17

## (undated) RX ORDER — DEXAMETHASONE SODIUM PHOSPHATE 10 MG/ML
INJECTION, SOLUTION INTRAMUSCULAR; INTRAVENOUS
Status: DISPENSED
Start: 2024-09-27

## (undated) RX ORDER — LIDOCAINE HYDROCHLORIDE AND EPINEPHRINE 10; 10 MG/ML; UG/ML
INJECTION, SOLUTION INFILTRATION; PERINEURAL
Status: DISPENSED
Start: 2023-01-17

## (undated) RX ORDER — ACETAMINOPHEN 325 MG/1
TABLET ORAL
Status: DISPENSED
Start: 2024-09-27

## (undated) RX ORDER — CEFAZOLIN SODIUM/WATER 2 G/20 ML
SYRINGE (ML) INTRAVENOUS
Status: DISPENSED
Start: 2023-01-17

## (undated) RX ORDER — EPINEPHRINE NASAL SOLUTION 1 MG/ML
SOLUTION NASAL
Status: DISPENSED
Start: 2023-02-02

## (undated) RX ORDER — FENTANYL CITRATE-0.9 % NACL/PF 10 MCG/ML
PLASTIC BAG, INJECTION (ML) INTRAVENOUS
Status: DISPENSED
Start: 2023-02-02

## (undated) RX ORDER — LIDOCAINE HYDROCHLORIDE AND EPINEPHRINE 10; 10 MG/ML; UG/ML
INJECTION, SOLUTION INFILTRATION; PERINEURAL
Status: DISPENSED
Start: 2024-09-27

## (undated) RX ORDER — OXYMETAZOLINE HYDROCHLORIDE 0.05 G/100ML
SPRAY NASAL
Status: DISPENSED
Start: 2023-02-02

## (undated) RX ORDER — ESMOLOL HYDROCHLORIDE 10 MG/ML
INJECTION INTRAVENOUS
Status: DISPENSED
Start: 2023-02-02

## (undated) RX ORDER — DEXAMETHASONE SODIUM PHOSPHATE 4 MG/ML
INJECTION, SOLUTION INTRA-ARTICULAR; INTRALESIONAL; INTRAMUSCULAR; INTRAVENOUS; SOFT TISSUE
Status: DISPENSED
Start: 2023-02-02